# Patient Record
Sex: MALE | Race: WHITE | NOT HISPANIC OR LATINO | Employment: OTHER | URBAN - METROPOLITAN AREA
[De-identification: names, ages, dates, MRNs, and addresses within clinical notes are randomized per-mention and may not be internally consistent; named-entity substitution may affect disease eponyms.]

---

## 2018-02-28 ENCOUNTER — OFFICE VISIT CONVERTED (OUTPATIENT)
Dept: CARDIOLOGY | Facility: CLINIC | Age: 58
End: 2018-02-28
Attending: INTERNAL MEDICINE

## 2018-04-06 ENCOUNTER — OFFICE VISIT CONVERTED (OUTPATIENT)
Dept: OTOLARYNGOLOGY | Facility: CLINIC | Age: 58
End: 2018-04-06
Attending: OTOLARYNGOLOGY

## 2018-04-23 ENCOUNTER — CONVERSION ENCOUNTER (OUTPATIENT)
Dept: UROLOGY | Facility: CLINIC | Age: 58
End: 2018-04-23

## 2018-04-23 ENCOUNTER — OFFICE VISIT CONVERTED (OUTPATIENT)
Dept: UROLOGY | Facility: CLINIC | Age: 58
End: 2018-04-23
Attending: UROLOGY

## 2018-05-04 ENCOUNTER — OFFICE VISIT CONVERTED (OUTPATIENT)
Dept: OTOLARYNGOLOGY | Facility: CLINIC | Age: 58
End: 2018-05-04
Attending: OTOLARYNGOLOGY

## 2018-05-24 ENCOUNTER — OFFICE VISIT CONVERTED (OUTPATIENT)
Dept: UROLOGY | Facility: CLINIC | Age: 58
End: 2018-05-24
Attending: UROLOGY

## 2018-05-24 ENCOUNTER — CONVERSION ENCOUNTER (OUTPATIENT)
Dept: UROLOGY | Facility: CLINIC | Age: 58
End: 2018-05-24

## 2018-06-15 ENCOUNTER — OFFICE VISIT CONVERTED (OUTPATIENT)
Dept: OTOLARYNGOLOGY | Facility: CLINIC | Age: 58
End: 2018-06-15
Attending: OTOLARYNGOLOGY

## 2018-09-17 ENCOUNTER — TELEPHONE (OUTPATIENT)
Dept: ENDOCRINOLOGY | Age: 58
End: 2018-09-17

## 2018-09-17 NOTE — TELEPHONE ENCOUNTER
----- Message from Cristóbal Lee sent at 9/14/2018  3:32 PM EDT -----  Contact: PATIENT  PATIENT HAS AN APPT. OCT. 9TH, HE IS GOING TO RUN OUT OF [facility] AND WOULD LIKE A REFILL BEFORE HE GETS IN HERE IF AT ALL POSSIBLE     [facility] [address] [place] [address] [place] - [address] - [phone]  - [phone] (Phone)  [phone] (Fax)          Patient will need to be seen before refills can be sent.

## 2018-10-09 ENCOUNTER — OFFICE VISIT (OUTPATIENT)
Dept: ENDOCRINOLOGY | Age: 58
End: 2018-10-09

## 2018-10-09 VITALS
BODY MASS INDEX: 27.05 KG/M2 | DIASTOLIC BLOOD PRESSURE: 64 MMHG | SYSTOLIC BLOOD PRESSURE: 104 MMHG | WEIGHT: 210.8 LBS | HEIGHT: 74 IN

## 2018-10-09 DIAGNOSIS — F52.21 ERECTILE DYSFUNCTION OF NONORGANIC ORIGIN: ICD-10-CM

## 2018-10-09 DIAGNOSIS — E11.65 UNCONTROLLED TYPE 2 DIABETES MELLITUS WITH HYPERGLYCEMIA (HCC): Primary | ICD-10-CM

## 2018-10-09 DIAGNOSIS — E03.9 HYPOTHYROIDISM, UNSPECIFIED TYPE: ICD-10-CM

## 2018-10-09 DIAGNOSIS — E55.9 VITAMIN D DEFICIENCY: ICD-10-CM

## 2018-10-09 DIAGNOSIS — E11.42 DIABETIC PERIPHERAL NEUROPATHY (HCC): ICD-10-CM

## 2018-10-09 DIAGNOSIS — E29.1 HYPOGONADISM IN MALE: ICD-10-CM

## 2018-10-09 DIAGNOSIS — I10 BENIGN ESSENTIAL HTN: ICD-10-CM

## 2018-10-09 DIAGNOSIS — E23.0 PANHYPOPITUITARISM (HCC): ICD-10-CM

## 2018-10-09 PROBLEM — R41.3 MEMORY LOSS: Status: ACTIVE | Noted: 2018-10-09

## 2018-10-09 PROBLEM — E11.9 DIABETES MELLITUS (HCC): Status: ACTIVE | Noted: 2018-10-09

## 2018-10-09 PROBLEM — R41.840 ATTENTION DISTURBANCE: Status: ACTIVE | Noted: 2018-10-09

## 2018-10-09 PROBLEM — IMO0002 UNCONTROLLED TYPE 2 DIABETES MELLITUS: Status: ACTIVE | Noted: 2018-10-09

## 2018-10-09 PROCEDURE — 99204 OFFICE O/P NEW MOD 45 MIN: CPT | Performed by: NURSE PRACTITIONER

## 2018-10-09 RX ORDER — ATORVASTATIN CALCIUM 40 MG/1
40 TABLET, FILM COATED ORAL DAILY
COMMUNITY
Start: 2015-06-23 | End: 2019-01-14 | Stop reason: SDUPTHER

## 2018-10-09 RX ORDER — INSULIN DEGLUDEC 200 U/ML
80 INJECTION, SOLUTION SUBCUTANEOUS DAILY
Qty: 3 PEN | Refills: 5 | Status: SHIPPED | OUTPATIENT
Start: 2018-10-09 | End: 2019-01-14 | Stop reason: SDUPTHER

## 2018-10-09 RX ORDER — MELOXICAM 7.5 MG/1
7.5 TABLET ORAL DAILY
COMMUNITY
Start: 2015-06-23 | End: 2022-08-22 | Stop reason: ALTCHOICE

## 2018-10-09 RX ORDER — TAMSULOSIN HYDROCHLORIDE 0.4 MG/1
0.4 CAPSULE ORAL DAILY
COMMUNITY
Start: 2015-06-23

## 2018-10-09 RX ORDER — LEVOTHYROXINE SODIUM 0.12 MG/1
125 TABLET ORAL DAILY
COMMUNITY
End: 2018-10-15

## 2018-10-09 RX ORDER — ANASTROZOLE 1 MG/1
1 TABLET ORAL DAILY
Status: ON HOLD | COMMUNITY
End: 2020-01-14

## 2018-10-09 RX ORDER — DAPAGLIFLOZIN AND METFORMIN HYDROCHLORIDE 5; 1000 MG/1; MG/1
2 TABLET, FILM COATED, EXTENDED RELEASE ORAL DAILY
Qty: 60 TABLET | Refills: 5
Start: 2018-10-09 | End: 2018-10-31 | Stop reason: SDUPTHER

## 2018-10-09 RX ORDER — SPIRONOLACTONE 25 MG/1
1 TABLET ORAL DAILY
COMMUNITY
Start: 2015-06-23 | End: 2019-01-14 | Stop reason: SDUPTHER

## 2018-10-09 RX ORDER — ALLOPURINOL 300 MG/1
300 TABLET ORAL DAILY
COMMUNITY
Start: 2015-06-23 | End: 2019-01-14 | Stop reason: SDUPTHER

## 2018-10-09 RX ORDER — LEVOTHYROXINE SODIUM 0.1 MG/1
100 TABLET ORAL DAILY
COMMUNITY
End: 2018-10-15

## 2018-10-09 RX ORDER — RANITIDINE 150 MG/1
1 CAPSULE ORAL DAILY
COMMUNITY
Start: 2015-06-23

## 2018-10-09 RX ORDER — LISINOPRIL 10 MG/1
10 TABLET ORAL DAILY
COMMUNITY
Start: 2015-06-23 | End: 2019-01-14 | Stop reason: SDUPTHER

## 2018-10-09 RX ORDER — ASPIRIN 81 MG/1
1 TABLET ORAL DAILY
COMMUNITY
Start: 2015-06-23

## 2018-10-09 RX ORDER — CARVEDILOL 12.5 MG/1
1 TABLET ORAL 2 TIMES DAILY
COMMUNITY
Start: 2015-06-23 | End: 2019-01-14 | Stop reason: SDUPTHER

## 2018-10-09 NOTE — PATIENT INSTRUCTIONS
Increase tresiba u200 80 units once daily in morning  Take bs's 4 times daily  Stop sliding scale insulin  Take humalog 15 units prior to each meal  bydureon bcise 2 mg once weekly  Continue xigduo

## 2018-10-09 NOTE — PROGRESS NOTES
"Subjective   Narciso Mejía is a 58 y.o. male is here today for New patient referral.  Chief Complaint   Patient presents with   • Diabetes     New patient, New patient paperwork   • Hypothyroidism     /64   Ht 188 cm (74\")   Wt 95.6 kg (210 lb 12.8 oz)   BMI 27.07 kg/m²   Current Outpatient Prescriptions on File Prior to Visit   Medication Sig   • TANZEUM 50 MG pen-injector INJECT 1 PEN PER WEEK AS INSTRUCTED     No current facility-administered medications on file prior to visit.      No family history on file.  Social History   Substance Use Topics   • Smoking status: Not on file   • Smokeless tobacco: Not on file   • Alcohol use Not on file     Allergies   Allergen Reactions   • Penicillins Swelling         History of Present Illness  Encounter Diagnoses   Name Primary?   • Hypogonadism in male    • Hypothyroidism, unspecified type    • Uncontrolled type 2 diabetes mellitus with hyperglycemia (CMS/[facility]) Yes   • Erectile dysfunction of nonorganic origin    This is a 58-year-old male patient here today as a new patient referral.  He was a previous patient of ours greater than 3 years ago.  He has been managed by his primary care provider as well as another endocrinologist prior to coming back.  He is thinking that he may be needs a insulin pump.  He is currently not count carbohydrates.  He was diagnosed with diabetes 17 years ago.  He has memory issues and is only taking his medications approximately 50% of the time.  He is complaining of increased thirst and increased urination.  He drinks a lot of Gatorade, Powerade and daily to combat thirst.  He has never seen a neurologist regarding his issues with memory loss.  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  He states \"providers refuse to prescribe it for him.  He has regained approximately 25 pounds in the past 4 months according to his home skills.  He states he got down to 185 pounds and " now is up to 210.  He states his blood sugars have been 300+ and sometimes his meter will just read high.  We discussed options for pump therapy however patient is not currently checking his blood sugars and given his history of poor compliance he may not be a good candidate for an insulin pump.  We did discuss continuous glucose monitoring which would benefit him.  Patient was given information at today's visit.  He is a poor historian regarding his current medications and his medication list was revised several times while patient was in the office.  He states he is currently on testosterone therapy however he is not being prescribed as much testosterone as he was prior to when he will follow-up in this practice. He does not eat sweets, bread, carbs, or alcohol. He states he mostly eats meat, cheese and veggies.         The following portions of the patient's history were reviewed and updated as appropriate: allergies, current medications, past family history, past medical history, past social history, past surgical history and problem list.    Review of Systems   Constitutional: Positive for fatigue.   HENT: Negative for trouble swallowing.    Eyes: Negative for visual disturbance.   Respiratory: Negative for shortness of breath.    Cardiovascular: Negative for leg swelling.   Endocrine: Negative for polyuria.   Skin: Negative for wound.   Neurological: Positive for numbness.       Objective   Physical Exam   Constitutional: He is oriented to person, place, and time. He appears well-developed and well-nourished. No distress.   HENT:   Head: Normocephalic and atraumatic.   Right Ear: External ear normal.   Left Ear: External ear normal.   Nose: Nose normal.   Eyes: Pupils are equal, round, and reactive to light. Right eye exhibits no discharge. Left eye exhibits no discharge.   Neck: Normal range of motion. Neck supple. Carotid bruit is not present. No tracheal deviation, no edema and no erythema present. No  thyromegaly present.   Cardiovascular: Normal rate, regular rhythm, normal heart sounds and intact distal pulses.  Exam reveals no gallop and no friction rub.    No murmur heard.  Pulmonary/Chest: Effort normal and breath sounds normal. No respiratory distress. He has no wheezes. He has no rales.   Abdominal: Soft. Bowel sounds are normal. He exhibits no distension. There is no tenderness.   Musculoskeletal: Normal range of motion. He exhibits no edema or deformity.    Diabetic foot exam performed: no lesions skin intact callus on rt ankle.   During the foot exam he had a monofilament test performed (no feeling up to knees).  Vascular Status -  His right foot exhibits normal foot vasculature  and no edema. His left foot exhibits normal foot vasculature  and no edema.  Skin Integrity  -  His right foot skin is intact.His left foot skin is intact..  Lymphadenopathy:     He has no cervical adenopathy.   Neurological: He is alert and oriented to person, place, and time. Coordination normal.   Skin: Skin is warm and dry. No rash noted. He is not diaphoretic. No erythema. No pallor.   Psychiatric: He has a normal mood and affect. His behavior is normal. Judgment and thought content normal.   Nursing note and vitals reviewed.       Results for orders placed or performed in visit on 06/23/15   TestT+TestF+SHBG   Result Value Ref Range    Testosterone, Total 898 348 - 1,197 ng/dL    Comment      Testosterone, Free 15.1 7.2 - 24.0 pg/mL    Sex Hormone Binding Globulin 50.1 19.3 - 76.4 nmol/L   Comprehensive metabolic panel   Result Value Ref Range    Glucose 89 65 - 99 mg/dL    BUN 23 (H) 6 - 20 mg/dL    Creatinine 1.07 0.76 - 1.27 mg/dL    eGFR Non African Am >60 mL/min/1.732    eGFR African Am >60 mL/min/1.732    BUN/Creatinine Ratio 21     Sodium 140 136 - 145 mmol/L    Potassium 4.0 3.5 - 5.2 mmol/L    Chloride 104 98 - 107 mmol/L    Total CO2 23 22 - 29 mmol/L    Calcium 9.9 8.6 - 10.5 mg/dL    Total Protein 7.2 6.0 -  8.5 g/dL    Albumin 4.8 3.5 - 5.2 g/dL    Globulin 2.4 g/dL    A/G Ratio 2.0     Total Bilirubin 0.5 0.1 - 1.2 mg/dL    Alkaline Phosphatase 72 39 - 117 U/L    AST (SGOT) 31 5 - 40 U/L    ALT (SGPT) 32 5 - 41 U/L   Lipid panel   Result Value Ref Range    Total Cholesterol 97 0 - 200 mg/dL    Triglycerides 98 0 - 150 mg/dL    HDL Cholesterol 29 (L) 40 - 60 mg/dL    VLDL Cholesterol 20 mg/dL    LDL Cholesterol  49 0 - 100 mg/dL   Thyroid Panel With TSH   Result Value Ref Range    TSH 0.009 (L) 0.450 - 4.500 uIU/mL    T4, Total 9.9 4.5 - 12.0 ug/dL    T3 Uptake 36 24 - 39 %    Free Thyroxine Index 3.6 1.2 - 4.9   Hemoglobin and hematocrit, blood   Result Value Ref Range    Hemoglobin 17.1 13.7 - 17.6 g/dL    Hematocrit 50.4 40.4 - 52.2 %   Hemoglobin A1c   Result Value Ref Range    Hemoglobin A1C 9.3 (H) 4.8 - 5.6 %   T4, free   Result Value Ref Range    Free T4 2.65 (H) 0.93 - 1.70 ng/dL   C-peptide   Result Value Ref Range    C-Peptide 2.4 1.1 - 4.4 ng/mL   Thyrotropin receptor antibody   Result Value Ref Range    Thyrotropin Receptor Antibody <0.51 0.00 - 1.75 IU/L   PSA   Result Value Ref Range    PSA 0.37 0.00 - 4.00 ng/mL   Insulin-like growth factor   Result Value Ref Range    Insulin-Like Growth Factor-1 100 61 - 200 ng/mL   Thyroglobulin with Anti-TG   Result Value Ref Range    Thyroglobulin Ab <1.0 0.0 - 0.9 IU/mL   Conv Thyroglobulin, Serum   Result Value Ref Range    Thyroglobulin 0.2 (L) 1.4 - 29.2 ng/mL   Vitamin D 25 hydroxy   Result Value Ref Range    25 Hydroxy, Vitamin D 58.5 30.0 - 100.0 ng/mL   MicroAlbumin, urine, random   Result Value Ref Range    Microalbumin, Urine 5.0 0.0 - 17.0 ug/mL   Growth hormone   Result Value Ref Range    Growth Hormone 1.7 0.0 - 10.0 ng/mL   Thyroid peroxidase antibody   Result Value Ref Range    Thyroid Peroxidase Antibody 7 0 - 34 IU/mL   T3, free   Result Value Ref Range    T3, Free 3.6 2.0 - 4.4 pg/mL     Lab Results   Component Value Date    HGBA1C 9.3 (H)  06/23/2015       Assessment/Plan   Problems Addressed this Visit        Endocrine    Hypogonadism in male    Hypothyroidism    Relevant Medications    carvedilol (COREG) 12.5 MG tablet    levothyroxine (SYNTHROID, LEVOTHROID) 100 MCG tablet    levothyroxine (SYNTHROID, LEVOTHROID) 125 MCG tablet    Uncontrolled type 2 diabetes mellitus (CMS/HCC) - Primary    Relevant Medications    insulin degludec (TRESIBA FLEXTOUCH) 100 UNIT/ML solution pen-injector injection    Insulin Lispro (HUMALOG KWIKPEN) 100 UNIT/ML solution pen-injector       Other    Erectile dysfunction of nonorganic origin        In summary, patient was seen and examined.  His medical records were refilled viewed from his primary care provider.  His last hemoglobin A1c was 9.9.  His TSH was low at 0.01.  He does not have any renal insufficiency according to his last CMP.  He will have extensive labs done at today's visit and will be notified of the results along with any further recommendations.  In the meantime his medications were changed as listed below.  Patient states he is currently on testosterone therapy not prescribed by this office.  A Kulwinedr has been requested.  He has been advised to stop sliding scale insulin based on blood sugars since this is obviously not working for him.  He has been advised to take Humalog 15 units prior to each meal and to check his blood sugars at least 4 times daily.  He has been prescribed Bydureon 2 mg once weekly.  He was given a sample today of tresiba U200 with instructions to increase to 80 units once daily.  Patient was noted to have bilateral peripheral neuropathy at today's visit.  He does have memory issues and we discussed him discussing with his PCP about a potential referral for neurology.  Patient states he is safe to take care of himself and not a harm to himself due to his memory issues.  I've asked that he bring his medications with him to his next visit so that we can verify what he is currently  taking.  Patient's prognosis for improving his diabetes management is limited based on his history of not taking medications as prescribed.  He will follow-up with me in 3 months.  Increase tresiba u200 80 units once daily in morning  Take bs's 4 times daily  Stop sliding scale insulin  Take humalog 15 units prior to each meal  bydureon bcise 2 mg once weekly

## 2018-10-15 DIAGNOSIS — R74.8 ALKALINE PHOSPHATASE ELEVATION: ICD-10-CM

## 2018-10-15 DIAGNOSIS — E23.0 PANHYPOPITUITARISM (HCC): Primary | ICD-10-CM

## 2018-10-15 LAB
25(OH)D3+25(OH)D2 SERPL-MCNC: 95.4 NG/ML (ref 30–100)
ACTH PLAS-MCNC: 41.5 PG/ML (ref 7.2–63.3)
ALBUMIN SERPL-MCNC: 4.8 G/DL (ref 3.5–5.2)
ALBUMIN/GLOB SERPL: 1.8 G/DL
ALP SERPL-CCNC: 152 U/L (ref 39–117)
ALT SERPL-CCNC: 32 U/L (ref 1–41)
AST SERPL-CCNC: 21 U/L (ref 1–40)
BILIRUB SERPL-MCNC: 0.2 MG/DL (ref 0.1–1.2)
BUN SERPL-MCNC: 23 MG/DL (ref 6–20)
BUN/CREAT SERPL: 25.8 (ref 7–25)
C PEPTIDE SERPL-MCNC: 1.2 NG/ML (ref 1.1–4.4)
CALCIUM SERPL-MCNC: 10.2 MG/DL (ref 8.6–10.5)
CHLORIDE SERPL-SCNC: 102 MMOL/L (ref 98–107)
CHOLEST SERPL-MCNC: 167 MG/DL (ref 0–200)
CO2 SERPL-SCNC: 26.1 MMOL/L (ref 22–29)
CORTIS SERPL-MCNC: 12.3 UG/DL
CREAT SERPL-MCNC: 0.89 MG/DL (ref 0.76–1.27)
FT4I SERPL CALC-MCNC: 3.6 (ref 1.2–4.9)
GH SERPL-MCNC: <0.1 NG/ML (ref 0–10)
GLOBULIN SER CALC-MCNC: 2.6 GM/DL
GLUCOSE SERPL-MCNC: 223 MG/DL (ref 65–99)
HBA1C MFR BLD: 10.1 % (ref 4.8–5.6)
HCT VFR BLD AUTO: 46.7 % (ref 40.4–52.2)
HDLC SERPL-MCNC: 39 MG/DL (ref 40–60)
HGB BLD-MCNC: 16.1 G/DL (ref 13.7–17.6)
IGF-I SERPL-MCNC: 70 NG/ML (ref 54–194)
INSULIN AB SER-ACNC: 16 UU/ML
INTERPRETATION: NORMAL
LDLC SERPL CALC-MCNC: 92 MG/DL (ref 0–100)
Lab: NORMAL
POTASSIUM SERPL-SCNC: 4.4 MMOL/L (ref 3.5–5.2)
PROT SERPL-MCNC: 7.4 G/DL (ref 6–8.5)
PSA SERPL-MCNC: 0.1 NG/ML (ref 0–4)
SHBG SERPL-SCNC: 91.5 NMOL/L (ref 19.3–76.4)
SODIUM SERPL-SCNC: 142 MMOL/L (ref 136–145)
T3FREE SERPL-MCNC: 4.7 PG/ML (ref 2–4.4)
T3RU NFR SERPL: 35 % (ref 24–39)
T4 FREE SERPL-MCNC: 2.48 NG/DL (ref 0.93–1.7)
T4 SERPL-MCNC: 10.4 UG/DL (ref 4.5–12)
TESTOST FREE SERPL-MCNC: 1.5 PG/ML (ref 7.2–24)
TESTOST SERPL-MCNC: 123 NG/DL (ref 264–916)
THYROGLOB AB SERPL-ACNC: <1 IU/ML (ref 0–0.9)
THYROPEROXIDASE AB SERPL-ACNC: 16 IU/ML (ref 0–34)
TRIGL SERPL-MCNC: 181 MG/DL (ref 0–150)
TSH SERPL DL<=0.005 MIU/L-ACNC: <0.006 UIU/ML (ref 0.45–4.5)
URATE SERPL-MCNC: 3.8 MG/DL (ref 3.4–7)
VLDLC SERPL CALC-MCNC: 36.2 MG/DL (ref 5–40)

## 2018-10-15 RX ORDER — LEVOTHYROXINE SODIUM 0.2 MG/1
200 TABLET ORAL DAILY
Qty: 30 TABLET | Refills: 5 | Status: SHIPPED | OUTPATIENT
Start: 2018-10-15 | End: 2019-01-14 | Stop reason: SDUPTHER

## 2018-10-15 RX ORDER — SODIUM CHLORIDE 9 MG/ML
250 INJECTION, SOLUTION INTRAVENOUS ONCE
Status: CANCELLED | OUTPATIENT
Start: 2018-10-15

## 2018-10-18 ENCOUNTER — TELEPHONE (OUTPATIENT)
Dept: ENDOCRINOLOGY | Age: 58
End: 2018-10-18

## 2018-10-18 NOTE — TELEPHONE ENCOUNTER
Called patient with lab results, unable to leave message due to mailbox being full.       Patient called back and johnny gave patient his lab results. The patient expressed understanding.

## 2018-10-23 ENCOUNTER — HOSPITAL ENCOUNTER (OUTPATIENT)
Dept: INFUSION THERAPY | Facility: HOSPITAL | Age: 58
Discharge: HOME | End: 2018-10-23

## 2018-10-23 ENCOUNTER — HOSPITAL ENCOUNTER (OUTPATIENT)
Dept: ULTRASOUND IMAGING | Facility: HOSPITAL | Age: 58
Discharge: HOME | End: 2018-10-23
Admitting: NURSE PRACTITIONER

## 2018-10-23 VITALS
HEART RATE: 74 BPM | OXYGEN SATURATION: 98 % | SYSTOLIC BLOOD PRESSURE: 117 MMHG | DIASTOLIC BLOOD PRESSURE: 78 MMHG | RESPIRATION RATE: 20 BRPM | TEMPERATURE: 97.9 F

## 2018-10-23 DIAGNOSIS — R41.3 MEMORY LOSS: ICD-10-CM

## 2018-10-23 DIAGNOSIS — E23.0 PANHYPOPITUITARISM (HCC): ICD-10-CM

## 2018-10-23 DIAGNOSIS — E03.9 HYPOTHYROIDISM, UNSPECIFIED TYPE: ICD-10-CM

## 2018-10-23 DIAGNOSIS — R41.840 ATTENTION DISTURBANCE: ICD-10-CM

## 2018-10-23 DIAGNOSIS — E29.1 HYPOGONADISM IN MALE: ICD-10-CM

## 2018-10-23 DIAGNOSIS — R74.8 ALKALINE PHOSPHATASE ELEVATION: ICD-10-CM

## 2018-10-23 LAB
ALBUMIN SERPL-MCNC: 4.4 G/DL (ref 3.5–5.2)
ALBUMIN/GLOB SERPL: 1.4 G/DL
ALP SERPL-CCNC: 121 U/L (ref 39–117)
ALT SERPL W P-5'-P-CCNC: 33 U/L (ref 1–41)
ANION GAP SERPL CALCULATED.3IONS-SCNC: 6.1 MMOL/L
ANION GAP SERPL CALCULATED.3IONS-SCNC: 6.7 MMOL/L
ANION GAP SERPL CALCULATED.3IONS-SCNC: 8.8 MMOL/L
ANION GAP SERPL CALCULATED.3IONS-SCNC: 9.1 MMOL/L
ANION GAP SERPL CALCULATED.3IONS-SCNC: 9.2 MMOL/L
ANION GAP SERPL CALCULATED.3IONS-SCNC: 9.9 MMOL/L
AST SERPL-CCNC: 22 U/L (ref 1–40)
BILIRUB SERPL-MCNC: 0.3 MG/DL (ref 0.1–1.2)
BUN BLD-MCNC: 22 MG/DL (ref 6–20)
BUN BLD-MCNC: 25 MG/DL (ref 6–20)
BUN BLD-MCNC: 25 MG/DL (ref 6–20)
BUN BLD-MCNC: 26 MG/DL (ref 6–20)
BUN/CREAT SERPL: 25.6 (ref 7–25)
BUN/CREAT SERPL: 30.1 (ref 7–25)
BUN/CREAT SERPL: 31.3 (ref 7–25)
BUN/CREAT SERPL: 32.9 (ref 7–25)
BUN/CREAT SERPL: 36.1 (ref 7–25)
BUN/CREAT SERPL: 37.1 (ref 7–25)
CALCIUM SPEC-SCNC: 10.2 MG/DL (ref 8.6–10.5)
CALCIUM SPEC-SCNC: 9.5 MG/DL (ref 8.6–10.5)
CALCIUM SPEC-SCNC: 9.5 MG/DL (ref 8.6–10.5)
CALCIUM SPEC-SCNC: 9.6 MG/DL (ref 8.6–10.5)
CALCIUM SPEC-SCNC: 9.7 MG/DL (ref 8.6–10.5)
CALCIUM SPEC-SCNC: 9.8 MG/DL (ref 8.6–10.5)
CHLORIDE SERPL-SCNC: 106 MMOL/L (ref 98–107)
CHLORIDE SERPL-SCNC: 109 MMOL/L (ref 98–107)
CHLORIDE SERPL-SCNC: 110 MMOL/L (ref 98–107)
CO2 SERPL-SCNC: 21.9 MMOL/L (ref 22–29)
CO2 SERPL-SCNC: 23.1 MMOL/L (ref 22–29)
CO2 SERPL-SCNC: 23.9 MMOL/L (ref 22–29)
CO2 SERPL-SCNC: 24.2 MMOL/L (ref 22–29)
CO2 SERPL-SCNC: 24.3 MMOL/L (ref 22–29)
CO2 SERPL-SCNC: 28.8 MMOL/L (ref 22–29)
CREAT BLD-MCNC: 0.7 MG/DL (ref 0.76–1.27)
CREAT BLD-MCNC: 0.72 MG/DL (ref 0.76–1.27)
CREAT BLD-MCNC: 0.79 MG/DL (ref 0.76–1.27)
CREAT BLD-MCNC: 0.8 MG/DL (ref 0.76–1.27)
CREAT BLD-MCNC: 0.83 MG/DL (ref 0.76–1.27)
CREAT BLD-MCNC: 0.86 MG/DL (ref 0.76–1.27)
GFR SERPL CREATININE-BSD FRML MDRD: 101 ML/MIN/1.73
GFR SERPL CREATININE-BSD FRML MDRD: 112 ML/MIN/1.73
GFR SERPL CREATININE-BSD FRML MDRD: 116 ML/MIN/1.73
GFR SERPL CREATININE-BSD FRML MDRD: 91 ML/MIN/1.73
GFR SERPL CREATININE-BSD FRML MDRD: 95 ML/MIN/1.73
GFR SERPL CREATININE-BSD FRML MDRD: 99 ML/MIN/1.73
GLOBULIN UR ELPH-MCNC: 3.1 GM/DL
GLUCOSE BLD-MCNC: 149 MG/DL (ref 65–99)
GLUCOSE BLD-MCNC: 157 MG/DL (ref 65–99)
GLUCOSE BLD-MCNC: 169 MG/DL (ref 65–99)
GLUCOSE BLD-MCNC: 171 MG/DL (ref 65–99)
GLUCOSE BLD-MCNC: 181 MG/DL (ref 65–99)
GLUCOSE BLD-MCNC: 197 MG/DL (ref 65–99)
POTASSIUM BLD-SCNC: 4.3 MMOL/L (ref 3.5–5.2)
POTASSIUM BLD-SCNC: 4.6 MMOL/L (ref 3.5–5.2)
POTASSIUM BLD-SCNC: 4.6 MMOL/L (ref 3.5–5.2)
POTASSIUM BLD-SCNC: 5 MMOL/L (ref 3.5–5.2)
POTASSIUM BLD-SCNC: 5.3 MMOL/L (ref 3.5–5.2)
POTASSIUM BLD-SCNC: 5.5 MMOL/L (ref 3.5–5.2)
PROT SERPL-MCNC: 7.5 G/DL (ref 6–8.5)
SODIUM BLD-SCNC: 139 MMOL/L (ref 136–145)
SODIUM BLD-SCNC: 140 MMOL/L (ref 136–145)
SODIUM BLD-SCNC: 141 MMOL/L (ref 136–145)
SODIUM BLD-SCNC: 142 MMOL/L (ref 136–145)
SODIUM BLD-SCNC: 142 MMOL/L (ref 136–145)
SODIUM BLD-SCNC: 144 MMOL/L (ref 136–145)

## 2018-10-23 PROCEDURE — 83003 ASSAY GROWTH HORMONE (HGH): CPT | Performed by: NURSE PRACTITIONER

## 2018-10-23 PROCEDURE — 80053 COMPREHEN METABOLIC PANEL: CPT | Performed by: NURSE PRACTITIONER

## 2018-10-23 PROCEDURE — 76705 ECHO EXAM OF ABDOMEN: CPT

## 2018-10-23 PROCEDURE — 36415 COLL VENOUS BLD VENIPUNCTURE: CPT

## 2018-10-23 PROCEDURE — 96365 THER/PROPH/DIAG IV INF INIT: CPT

## 2018-10-23 RX ORDER — SODIUM CHLORIDE 9 MG/ML
250 INJECTION, SOLUTION INTRAVENOUS ONCE
OUTPATIENT
Start: 2018-10-23

## 2018-10-23 RX ORDER — SODIUM CHLORIDE 9 MG/ML
250 INJECTION, SOLUTION INTRAVENOUS ONCE
Status: DISCONTINUED | OUTPATIENT
Start: 2018-10-23 | End: 2018-10-25 | Stop reason: HOSPADM

## 2018-10-23 RX ADMIN — ARGININE HYDROCHLORIDE 30 G: 10 INJECTION, SOLUTION INTRAVENOUS at 09:45

## 2018-10-23 NOTE — PROGRESS NOTES
Patient was NPO since midnight.  Tolerated procedure well.  Voided before test started and dozed intermittently during test.

## 2018-10-23 NOTE — PATIENT INSTRUCTIONS
Growth Hormone Stimulation Test  Growth hormone is made by the pituitary gland. The pituitary gland is a small organ located in the center of your brain. Growth hormone promotes growth from birth through the end of puberty. This is a blood test that may be done if your health care provider thinks you have growth hormone levels that are too low (deficient).  Growth hormone is released in different amounts during the day, so testing it randomly would not give an accurate measurement. Low blood sugar (glucose) levels normally cause the body to temporarily increase production of growth hormone. By causing you to experience low blood sugar (hypoglycemia), your health care provider can determine if your body has the ability to produce growth hormone as it should. One or more of the stimulants listed below can be used to test the growth hormone response:  · Insulin-induced hypoglycemia. This is when your blood sugar level is lowered to less than 40 mg/dL.  · Heavy exercise.  · Medicines such as:  ? Arginine.  ? Glucagon.  ? Levodopa.  ? Clonidine.    When two of these stimulants are used to perform the growth hormone stimulation test, it is called a double-stimulated test.  It may not be safe for you to have this test if you have any of the following conditions:  · Epilepsy.  · Cerebrovascular disease.  · A history of heart attack (myocardial infarction).  · Low baseline cortisol levels in your blood.    Ask your health care provider if it is safe for you to have this test.  How do I prepare for this test?  Do not eat or drink anything except water after midnight on the night before the test or as directed by your health care provider.  What do the results mean?  It is your responsibility to obtain your test results. Ask the lab or department performing the test when and how you will get your results. Contact your health care provider to discuss any questions you have about your results.  Range of Normal Values  Ranges for  normal values may vary among different labs and hospitals. You should always check with your health care provider after having lab work or other tests done to discuss whether your values are considered within normal limits.  For this test, normal values include growth hormone levels greater than 10 ng/dL or greater than 10 mcg/L (SI units).  Meaning of Results Outside Normal Value Ranges  Abnormally low growth hormone stimulation test results may indicate health problems, such as:  · Pituitary gland malfunction (pituitary deficiency).  · Growth hormone deficiency.    Discuss your test results with your health care provider. He or she will use the results to make a diagnosis and determine a treatment plan that is right for you.  Talk with your health care provider to discuss your results, treatment options, and if necessary, the need for more tests. Talk with your health care provider if you have any questions about your results.  This information is not intended to replace advice given to you by your health care provider. Make sure you discuss any questions you have with your health care provider.  Document Released: 01/11/2006 Document Revised: 08/23/2017 Document Reviewed: 04/28/2015  [employer] Interactive Patient Education © 2018 [employer]

## 2018-10-24 LAB
GH SERPL-MCNC: 0.2 NG/ML (ref 0–10)
GH SERPL-MCNC: 0.3 NG/ML (ref 0–10)
GH SERPL-MCNC: 0.4 NG/ML (ref 0–10)
GH SERPL-MCNC: 0.7 NG/ML (ref 0–10)
GH SERPL-MCNC: 1.4 NG/ML (ref 0–10)
GH SERPL-MCNC: 1.7 NG/ML (ref 0–10)
GH SERPL-MCNC: 1.9 NG/ML (ref 0–10)

## 2018-10-31 ENCOUNTER — TELEPHONE (OUTPATIENT)
Dept: ENDOCRINOLOGY | Age: 58
End: 2018-10-31

## 2018-10-31 RX ORDER — DAPAGLIFLOZIN AND METFORMIN HYDROCHLORIDE 5; 1000 MG/1; MG/1
2 TABLET, FILM COATED, EXTENDED RELEASE ORAL DAILY
Qty: 60 TABLET | Refills: 5 | Status: SHIPPED | OUTPATIENT
Start: 2018-10-31 | End: 2018-11-01 | Stop reason: SDUPTHER

## 2018-10-31 NOTE — TELEPHONE ENCOUNTER
----- Message from Cristóbal Lee sent at 10/31/2018  1:59 PM EDT -----  Contact: wife grayson  Patient needs script of XIGDUO XR 5-1000 MG tablet to be sent to     [facility] [address] [place] [address] - [address] [place] [address] - [phone]  - [phone] (Phone)  [phone] (Fax)    Patient is completely out, wife stated that she left a message last week and no one answered.         Refill has been sent into the pharmacy.

## 2018-11-01 RX ORDER — DAPAGLIFLOZIN AND METFORMIN HYDROCHLORIDE 5; 1000 MG/1; MG/1
2 TABLET, FILM COATED, EXTENDED RELEASE ORAL DAILY
Qty: 60 TABLET | Refills: 4 | Status: SHIPPED | OUTPATIENT
Start: 2018-11-01 | End: 2019-01-14 | Stop reason: SDUPTHER

## 2018-11-21 ENCOUNTER — TELEPHONE (OUTPATIENT)
Dept: ENDOCRINOLOGY | Age: 58
End: 2018-11-21

## 2018-11-21 NOTE — TELEPHONE ENCOUNTER
Spoke with pt and let him know that stim test was normal and that testosterone should be addressed with other provider. He asked to be put on the waitlist.    ----- Message from LOIS Vidal sent at 11/20/2018  3:46 PM EST -----  Contact: patient   His growth hormone was normal according to stim test that was done. His testosterone is managed by another practice according to my note. He can be put on wait list with dr johnson if he has concerns.   ----- Message -----  From: Monica Sy MA  Sent: 11/20/2018   2:57 PM  To: LOIS Vidal        ----- Message -----  From: Sola Estrada  Sent: 11/20/2018   1:00 PM  To: Monica Sy MA    Patient has called in regards to receiving  a letter about his stimulation testing results. Patient is very concerned about his HGH and Testerone levels.  Patient did not know, if he needed to be seen sooner appointment  to go over these results. Patient was also calling in regards to having a significant weight gain. Patient is stating, he has gained 30 pounds with in the last six months.  Patient is very concerned because he has not this significant amount of weight gain before. Patient is requesting a call back at [phone]

## 2019-01-14 ENCOUNTER — OFFICE VISIT (OUTPATIENT)
Dept: ENDOCRINOLOGY | Age: 59
End: 2019-01-14

## 2019-01-14 ENCOUNTER — TELEPHONE (OUTPATIENT)
Dept: ENDOCRINOLOGY | Age: 59
End: 2019-01-14

## 2019-01-14 VITALS
WEIGHT: 216 LBS | BODY MASS INDEX: 29.26 KG/M2 | HEIGHT: 72 IN | DIASTOLIC BLOOD PRESSURE: 72 MMHG | SYSTOLIC BLOOD PRESSURE: 128 MMHG

## 2019-01-14 DIAGNOSIS — Z91.14 NONADHERENCE TO MEDICATION: ICD-10-CM

## 2019-01-14 DIAGNOSIS — E23.0 PANHYPOPITUITARISM (HCC): ICD-10-CM

## 2019-01-14 DIAGNOSIS — I10 BENIGN ESSENTIAL HTN: ICD-10-CM

## 2019-01-14 DIAGNOSIS — E11.65 UNCONTROLLED TYPE 2 DIABETES MELLITUS WITH HYPERGLYCEMIA (HCC): Primary | ICD-10-CM

## 2019-01-14 DIAGNOSIS — E29.1 HYPOGONADISM IN MALE: ICD-10-CM

## 2019-01-14 DIAGNOSIS — E55.9 VITAMIN D DEFICIENCY: ICD-10-CM

## 2019-01-14 DIAGNOSIS — E11.42 DIABETIC PERIPHERAL NEUROPATHY (HCC): ICD-10-CM

## 2019-01-14 DIAGNOSIS — F52.21 ERECTILE DYSFUNCTION OF NONORGANIC ORIGIN: ICD-10-CM

## 2019-01-14 PROBLEM — Z91.148 NONADHERENCE TO MEDICATION: Status: ACTIVE | Noted: 2019-01-14

## 2019-01-14 PROCEDURE — 99214 OFFICE O/P EST MOD 30 MIN: CPT | Performed by: NURSE PRACTITIONER

## 2019-01-14 RX ORDER — SPIRONOLACTONE 25 MG/1
25 TABLET ORAL DAILY
Qty: 90 TABLET | Refills: 1 | Status: SHIPPED | OUTPATIENT
Start: 2019-01-14

## 2019-01-14 RX ORDER — CARVEDILOL 12.5 MG/1
12.5 TABLET ORAL 2 TIMES DAILY
Qty: 180 TABLET | Refills: 1 | Status: SHIPPED | OUTPATIENT
Start: 2019-01-14 | End: 2022-08-22 | Stop reason: ALTCHOICE

## 2019-01-14 RX ORDER — DAPAGLIFLOZIN AND METFORMIN HYDROCHLORIDE 5; 1000 MG/1; MG/1
2 TABLET, FILM COATED, EXTENDED RELEASE ORAL DAILY
Qty: 180 TABLET | Refills: 1 | Status: SHIPPED | OUTPATIENT
Start: 2019-01-14 | End: 2022-08-22 | Stop reason: ALTCHOICE

## 2019-01-14 RX ORDER — ATORVASTATIN CALCIUM 40 MG/1
40 TABLET, FILM COATED ORAL DAILY
Qty: 90 TABLET | Refills: 1 | Status: SHIPPED | OUTPATIENT
Start: 2019-01-14 | End: 2023-02-10

## 2019-01-14 RX ORDER — LISINOPRIL 10 MG/1
10 TABLET ORAL DAILY
Qty: 90 TABLET | Refills: 1 | Status: SHIPPED | OUTPATIENT
Start: 2019-01-14 | End: 2022-08-22 | Stop reason: ALTCHOICE

## 2019-01-14 RX ORDER — ALLOPURINOL 300 MG/1
300 TABLET ORAL DAILY
Qty: 90 TABLET | Refills: 1 | Status: SHIPPED | OUTPATIENT
Start: 2019-01-14

## 2019-01-14 RX ORDER — TESTOSTERONE CYPIONATE 200 MG/ML
INJECTION, SOLUTION INTRAMUSCULAR
Qty: 4 ML | Refills: 0 | Status: SHIPPED | OUTPATIENT
Start: 2019-01-14 | End: 2022-08-22 | Stop reason: ALTCHOICE

## 2019-01-14 RX ORDER — INSULIN DEGLUDEC 200 U/ML
80 INJECTION, SOLUTION SUBCUTANEOUS DAILY
Qty: 9 PEN | Refills: 1 | Status: SHIPPED | OUTPATIENT
Start: 2019-01-14

## 2019-01-14 RX ORDER — LEVOTHYROXINE SODIUM 0.2 MG/1
200 TABLET ORAL DAILY
Qty: 90 TABLET | Refills: 1 | Status: ON HOLD | OUTPATIENT
Start: 2019-01-14 | End: 2020-01-15

## 2019-01-14 NOTE — TELEPHONE ENCOUNTER
----- Message from LOIS Vidal sent at 1/14/2019  3:58 PM EST -----  Contact: BABAR-PHARMACIST  We monitor potassium and he is on low dose of spironolactone.   ----- Message -----  From: Mabel Pratt MA  Sent: 1/14/2019   3:44 PM  To: LOIS Vidal    No they just wanted us to be aware.   ----- Message -----  From: Susu Heck APRN  Sent: 1/14/2019   2:52 PM  To: Mabel Pratt MA    This is not a new rx this is a continuation of therapy according to med list. Are they stating this is a new rx?  ----- Message -----  From: Mabel Pratt MA  Sent: 1/14/2019   1:42 PM  To: LOIS Vidal    Please advise  ----- Message -----  From: Juanis Herron  Sent: 1/14/2019   1:32 PM  To: Mabel Pratt MA    [facility]: [phone]    POSSIBLE INCREASE POTASSIUM LVL W/ PT BEING ON SPIRONOLACTONE AND LISINOPRIL. HE JUST WANTED TO MAKE SURE THE PROVIDER IS AWARE OF THIS. HE SAID ANYTIME THERE IS A FLAG ON SCRIPTS HE WILL CALL OFFICE TO MAKE SURE IT IS CORRECT.

## 2019-01-14 NOTE — PROGRESS NOTES
"Subjective   Narciso Mejía is a 58 y.o. male is here today for follow-up.  Chief Complaint   Patient presents with   • Diabetes     no recent labs, testing BG 1 to 2 times daily, pt did not bring meter, pt has been experiencing high blood glucose   • Hypertension     pt had been taking 400mcg of levothyroxine until last week   • Hypothyroidism     pt states that he might be having a reaction to bydureon ( welts)   • Hypogonadism   • panhypopituatarism   • Vitamin D Deficiency     /72   Ht 182.9 cm (72\")   Wt 98 kg (216 lb)   BMI 29.29 kg/m²   Current Outpatient Medications on File Prior to Visit   Medication Sig   • allopurinol (ZYLOPRIM) 300 MG tablet Take 300 mg by mouth Daily.   • anastrozole (ARIMIDEX) 1 MG tablet Take 1 mg by mouth Daily.   • aspirin (ECOTRIN LOW STRENGTH) 81 MG EC tablet Take 1 tablet by mouth Daily.   • atorvastatin (LIPITOR) 40 MG tablet Take 40 mg by mouth Daily.   • carvedilol (COREG) 12.5 MG tablet Take 1 tablet by mouth 2 (Two) Times a Day.   • Insulin Lispro (HUMALOG KWIKPEN) 100 UNIT/ML solution pen-injector 15 units prior to each meal   • levothyroxine (SYNTHROID) 200 MCG tablet Take 1 tablet by mouth Daily.   • linaclotide (LINZESS) 145 MCG capsule capsule Take 145 mcg by mouth Every Morning Before Breakfast.   • lisinopril (PRINIVIL,ZESTRIL) 10 MG tablet Take 10 mg by mouth Daily.   • meloxicam (MOBIC) 7.5 MG tablet Take 7.5 mg by mouth Daily.   • spironolactone (ALDACTONE) 25 MG tablet Take 1 tablet by mouth Daily.   • tamsulosin (FLOMAX) 0.4 MG capsule 24 hr capsule Take 0.4 mg by mouth Daily.   • TRESIBA FLEXTOUCH 200 UNIT/ML solution pen-injector Inject 80 Units under the skin into the appropriate area as directed Daily.   • XIGDUO XR 5-1000 MG tablet Take 2 tablets by mouth Daily.   • [DISCONTINUED] exenatide er (BYDUREON BCISE) 2 MG/0.85ML auto-injector injection Inject 0.85 mL under the skin into the appropriate area as directed 1 (One) Time Per Week.   • ranitidine " (ZANTAC) 150 MG capsule Take 1 capsule by mouth Daily.     No current facility-administered medications on file prior to visit.      History reviewed. No pertinent family history.  Social History     Tobacco Use   • Smoking status: Former Smoker     Packs/day: 2.00     Years: 24.00     Pack years: 48.00     Types: Cigarettes   • Smokeless tobacco: Never Used   • Tobacco comment: 19 yeats ago   Substance Use Topics   • Alcohol use: No   • Drug use: No     Allergies   Allergen Reactions   • Bacitracin-Neomycin-Polymyxin Hives     Hives and blisters   • Bydureon [Exenatide] Hives   • Penicillins Swelling         History of Present Illness   Encounter Diagnoses   Name Primary?   • Benign essential HTN    • Vitamin D deficiency     • Diabetic peripheral neuropathy (CMS/HCC)    • Hypogonadism in male    • Panhypopituitarism (CMS/HCC)    • Uncontrolled type 2 diabetes mellitus with hyperglycemia (CMS/HCC) Yes   • Erectile dysfunction of nonorganic origin    • Nonadherence to medication      50-year-old male patient here today for a follow-up visit.  He is being seen for the above-mentioned problems.  He is requesting a prescription for his testosterone therapy.  We did not have testosterone on his medicine list.  He has been getting testosterone from  in the past and has since switched back to this practice.  He used to see Dr. Gaffney when he was in his other office.  He is a poor historian regarding his medical history.  He has been taking double his dose of levothyroxine 400 µg instead of the prescribed 200 µg daily.  His medical history was reviewed and according to his last labs his dose was decreased to 200.  He states he has had about a 7 pound weight loss with taking too much of levothyroxine.  He has been out of insulin for days and states his blood sugar got up to 582.  He was given a new blood glucose meter today's visit.  He is complaining of hives with Bydureon so this medication has been added to his  allergy list.  He has a history of panhypopituitarism and used to be on growth hormone however his last stimulation test was normal.  He's had elevated alkaline phosphatase and had a liver ultrasound done which was reviewed.  His previous labs were reviewed at today's visit.  His last hemoglobin A1c was 10.1.  He was hyperthyroid and his levothyroxine was decreased to 200 however he has been taking double the dose which caused him to run out of his prescription sooner      The following portions of the patient's history were reviewed and updated as appropriate: allergies, current medications, past family history, past medical history, past social history, past surgical history and problem list.    Review of Systems   Constitutional: Negative for fatigue.   HENT: Negative for trouble swallowing.    Eyes: Negative for visual disturbance.   Cardiovascular: Negative for leg swelling.   Endocrine: Negative for polyuria.   Skin: Negative for wound.   Neurological: Negative for numbness.       Objective   Physical Exam   Constitutional: He is oriented to person, place, and time. He appears well-developed and well-nourished. No distress.   HENT:   Head: Normocephalic and atraumatic.   Right Ear: External ear normal.   Left Ear: External ear normal.   Nose: Nose normal.   Eyes: Pupils are equal, round, and reactive to light. Right eye exhibits no discharge. Left eye exhibits no discharge.   Neck: Normal range of motion. Neck supple. Carotid bruit is not present. No tracheal deviation, no edema and no erythema present. No thyromegaly present.   Cardiovascular: Normal rate, regular rhythm, normal heart sounds and intact distal pulses. Exam reveals no gallop and no friction rub.   No murmur heard.  Pulmonary/Chest: Effort normal and breath sounds normal. No respiratory distress. He has no wheezes. He has no rales.   Abdominal: Soft. Bowel sounds are normal. He exhibits no distension. There is no tenderness.   Musculoskeletal:  Normal range of motion. He exhibits no edema or deformity.    Diabetic foot exam performed: no lesions skin intact callus on rt ankle.   During the foot exam he had a monofilament test not performed (History of retinopathy).  Vascular Status -  His right foot exhibits normal foot vasculature  and no edema. His left foot exhibits normal foot vasculature  and no edema.  Skin Integrity  -  His right foot skin is intact.His left foot skin is intact..  Lymphadenopathy:     He has no cervical adenopathy.   Neurological: He is alert and oriented to person, place, and time. Coordination normal.   Skin: Skin is warm and dry. No rash noted. He is not diaphoretic. No erythema. No pallor.   Psychiatric: He has a normal mood and affect. His behavior is normal. Judgment and thought content normal.   Nursing note and vitals reviewed.    Hospital Outpatient Visit on 10/23/2018   Component Date Value Ref Range Status   • Glucose 10/23/2018 171* 65 - 99 mg/dL Final   • BUN 10/23/2018 22* 6 - 20 mg/dL Final   • Creatinine 10/23/2018 0.86  0.76 - 1.27 mg/dL Final   • Sodium 10/23/2018 144  136 - 145 mmol/L Final   • Potassium 10/23/2018 4.6  3.5 - 5.2 mmol/L Final   • Chloride 10/23/2018 106  98 - 107 mmol/L Final   • CO2 10/23/2018 28.8  22.0 - 29.0 mmol/L Final   • Calcium 10/23/2018 10.2  8.6 - 10.5 mg/dL Final   • Total Protein 10/23/2018 7.5  6.0 - 8.5 g/dL Final   • Albumin 10/23/2018 4.40  3.50 - 5.20 g/dL Final   • ALT (SGPT) 10/23/2018 33  1 - 41 U/L Final   • AST (SGOT) 10/23/2018 22  1 - 40 U/L Final   • Alkaline Phosphatase 10/23/2018 121* 39 - 117 U/L Final   • Total Bilirubin 10/23/2018 0.3  0.1 - 1.2 mg/dL Final   • eGFR Non  Amer 10/23/2018 91  >60 mL/min/1.73 Final   • Globulin 10/23/2018 3.1  gm/dL Final   • A/G Ratio 10/23/2018 1.4  g/dL Final   • BUN/Creatinine Ratio 10/23/2018 25.6* 7.0 - 25.0 Final   • Anion Gap 10/23/2018 9.2  mmol/L Final   • Growth Hormone 10/23/2018 0.3  0.0 - 10.0 ng/mL Final   •  Growth Hormone 10/23/2018 0.2  0.0 - 10.0 ng/mL Final   • Glucose 10/23/2018 197* 65 - 99 mg/dL Final   • BUN 10/23/2018 25* 6 - 20 mg/dL Final   • Creatinine 10/23/2018 0.83  0.76 - 1.27 mg/dL Final   • Sodium 10/23/2018 139  136 - 145 mmol/L Final   • Potassium 10/23/2018 5.5* 3.5 - 5.2 mmol/L Final   • Chloride 10/23/2018 109* 98 - 107 mmol/L Final   • CO2 10/23/2018 23.9  22.0 - 29.0 mmol/L Final   • Calcium 10/23/2018 9.6  8.6 - 10.5 mg/dL Final   • eGFR Non African Amer 10/23/2018 95  >60 mL/min/1.73 Final   • BUN/Creatinine Ratio 10/23/2018 30.1* 7.0 - 25.0 Final   • Anion Gap 10/23/2018 6.1  mmol/L Final   • Growth Hormone 10/23/2018 1.9  0.0 - 10.0 ng/mL Final   • Glucose 10/23/2018 181* 65 - 99 mg/dL Final   • BUN 10/23/2018 25* 6 - 20 mg/dL Final   • Creatinine 10/23/2018 0.80  0.76 - 1.27 mg/dL Final   • Sodium 10/23/2018 140  136 - 145 mmol/L Final   • Potassium 10/23/2018 5.3* 3.5 - 5.2 mmol/L Final   • Chloride 10/23/2018 109* 98 - 107 mmol/L Final   • CO2 10/23/2018 24.3  22.0 - 29.0 mmol/L Final   • Calcium 10/23/2018 9.5  8.6 - 10.5 mg/dL Final   • eGFR Non African Amer 10/23/2018 99  >60 mL/min/1.73 Final   • BUN/Creatinine Ratio 10/23/2018 31.3* 7.0 - 25.0 Final   • Anion Gap 10/23/2018 6.7  mmol/L Final   • Growth Hormone 10/23/2018 0.7  0.0 - 10.0 ng/mL Final   • Glucose 10/23/2018 169* 65 - 99 mg/dL Final   • BUN 10/23/2018 26* 6 - 20 mg/dL Final   • Creatinine 10/23/2018 0.79  0.76 - 1.27 mg/dL Final   • Sodium 10/23/2018 142  136 - 145 mmol/L Final   • Potassium 10/23/2018 5.0  3.5 - 5.2 mmol/L Final   • Chloride 10/23/2018 109* 98 - 107 mmol/L Final   • CO2 10/23/2018 24.2  22.0 - 29.0 mmol/L Final   • Calcium 10/23/2018 9.5  8.6 - 10.5 mg/dL Final   • eGFR Non African Amer 10/23/2018 101  >60 mL/min/1.73 Final   • BUN/Creatinine Ratio 10/23/2018 32.9* 7.0 - 25.0 Final   • Anion Gap 10/23/2018 8.8  mmol/L Final   • Growth Hormone 10/23/2018 1.7  0.0 - 10.0 ng/mL Final   • Glucose 10/23/2018  157* 65 - 99 mg/dL Final   • BUN 10/23/2018 26* 6 - 20 mg/dL Final   • Creatinine 10/23/2018 0.72* 0.76 - 1.27 mg/dL Final   • Sodium 10/23/2018 142  136 - 145 mmol/L Final   • Potassium 10/23/2018 4.6  3.5 - 5.2 mmol/L Final   • Chloride 10/23/2018 109* 98 - 107 mmol/L Final   • CO2 10/23/2018 23.1  22.0 - 29.0 mmol/L Final   • Calcium 10/23/2018 9.7  8.6 - 10.5 mg/dL Final   • eGFR Non  Amer 10/23/2018 112  >60 mL/min/1.73 Final   • BUN/Creatinine Ratio 10/23/2018 36.1* 7.0 - 25.0 Final   • Anion Gap 10/23/2018 9.9  mmol/L Final   • Growth Hormone 10/23/2018 1.4  0.0 - 10.0 ng/mL Final   • Glucose 10/23/2018 149* 65 - 99 mg/dL Final   • BUN 10/23/2018 26* 6 - 20 mg/dL Final   • Creatinine 10/23/2018 0.70* 0.76 - 1.27 mg/dL Final   • Sodium 10/23/2018 141  136 - 145 mmol/L Final   • Potassium 10/23/2018 4.3  3.5 - 5.2 mmol/L Final   • Chloride 10/23/2018 110* 98 - 107 mmol/L Final   • CO2 10/23/2018 21.9* 22.0 - 29.0 mmol/L Final   • Calcium 10/23/2018 9.8  8.6 - 10.5 mg/dL Final   • eGFR Non  Amer 10/23/2018 116  >60 mL/min/1.73 Final   • BUN/Creatinine Ratio 10/23/2018 37.1* 7.0 - 25.0 Final   • Anion Gap 10/23/2018 9.1  mmol/L Final   • Growth Hormone 10/23/2018 0.4  0.0 - 10.0 ng/mL Final         Assessment/Plan   Problems Addressed this Visit        Cardiovascular and Mediastinum    Benign essential HTN    Relevant Orders    CBC & Differential    Comprehensive Metabolic Panel    C-Peptide    Hemoglobin A1c    Lipid Panel    Hemoglobin & Hematocrit, Blood    MicroAlbumin, Urine, Random - Urine, Clean Catch    T3, Free    T4, Free    TestT+TestF+SHBG    Thyroid Panel With TSH    Comprehensive Thyroglobulin    Vitamin D 25 Hydroxy    Thyroid Antibodies    Thyroid Peroxidase Antibody    PSA DIAGNOSTIC    FSH & LH    Growth Hormone    Prolactin    Insulin-like Growth Factor    Comprehensive Metabolic Panel    Comprehensive Thyroglobulin    C-Peptide    Hemoglobin A1c    Insulin Antibody    Lipid Panel     MicroAlbumin, Urine, Random - Urine, Clean Catch    T3, Free    TestT+TestF+SHBG    Thyroid Panel With TSH    Thyroid Peroxidase Antibody    Vitamin D 25 Hydroxy    Thyroid Antibodies    TSH    Prolactin    FSH & LH    Insulin-like Growth Factor    Growth Hormone    Hemoglobin & Hematocrit, Blood       Digestive    Vitamin D deficiency     Relevant Orders    CBC & Differential    Comprehensive Metabolic Panel    C-Peptide    Hemoglobin A1c    Lipid Panel    Hemoglobin & Hematocrit, Blood    MicroAlbumin, Urine, Random - Urine, Clean Catch    T3, Free    T4, Free    TestT+TestF+SHBG    Thyroid Panel With TSH    Comprehensive Thyroglobulin    Vitamin D 25 Hydroxy    Thyroid Antibodies    Thyroid Peroxidase Antibody    PSA DIAGNOSTIC    FSH & LH    Growth Hormone    Prolactin    Insulin-like Growth Factor    Comprehensive Metabolic Panel    Comprehensive Thyroglobulin    C-Peptide    Hemoglobin A1c    Insulin Antibody    Lipid Panel    MicroAlbumin, Urine, Random - Urine, Clean Catch    T3, Free    TestT+TestF+SHBG    Thyroid Panel With TSH    Thyroid Peroxidase Antibody    Vitamin D 25 Hydroxy    Thyroid Antibodies    TSH    Prolactin    FSH & LH    Insulin-like Growth Factor    Growth Hormone    Hemoglobin & Hematocrit, Blood       Endocrine    Hypogonadism in male    Relevant Orders    CBC & Differential    Comprehensive Metabolic Panel    C-Peptide    Hemoglobin A1c    Lipid Panel    Hemoglobin & Hematocrit, Blood    MicroAlbumin, Urine, Random - Urine, Clean Catch    T3, Free    T4, Free    TestT+TestF+SHBG    Thyroid Panel With TSH    Comprehensive Thyroglobulin    Vitamin D 25 Hydroxy    Thyroid Antibodies    Thyroid Peroxidase Antibody    PSA DIAGNOSTIC    FSH & LH    Growth Hormone    Prolactin    Insulin-like Growth Factor    Comprehensive Metabolic Panel    Comprehensive Thyroglobulin    C-Peptide    Hemoglobin A1c    Insulin Antibody    Lipid Panel    MicroAlbumin, Urine, Random - Urine, Clean Catch     T3, Free    TestT+TestF+SHBG    Thyroid Panel With TSH    Thyroid Peroxidase Antibody    Vitamin D 25 Hydroxy    Thyroid Antibodies    TSH    Prolactin    FSH & LH    Insulin-like Growth Factor    Growth Hormone    Hemoglobin & Hematocrit, Blood    Panhypopituitarism (CMS/HCC)    Relevant Orders    CBC & Differential    Comprehensive Metabolic Panel    C-Peptide    Hemoglobin A1c    Lipid Panel    Hemoglobin & Hematocrit, Blood    MicroAlbumin, Urine, Random - Urine, Clean Catch    T3, Free    T4, Free    TestT+TestF+SHBG    Thyroid Panel With TSH    Comprehensive Thyroglobulin    Vitamin D 25 Hydroxy    Thyroid Antibodies    Thyroid Peroxidase Antibody    PSA DIAGNOSTIC    FSH & LH    Growth Hormone    Prolactin    Insulin-like Growth Factor    Comprehensive Metabolic Panel    Comprehensive Thyroglobulin    C-Peptide    Hemoglobin A1c    Insulin Antibody    Lipid Panel    MicroAlbumin, Urine, Random - Urine, Clean Catch    T3, Free    TestT+TestF+SHBG    Thyroid Panel With TSH    Thyroid Peroxidase Antibody    Vitamin D 25 Hydroxy    Thyroid Antibodies    TSH    Prolactin    FSH & LH    Insulin-like Growth Factor    Growth Hormone    Hemoglobin & Hematocrit, Blood    Uncontrolled type 2 diabetes mellitus (CMS/[facility]) - Primary    Relevant Orders    CBC & Differential    Comprehensive Metabolic Panel    C-Peptide    Hemoglobin A1c    Lipid Panel    Hemoglobin & Hematocrit, Blood    MicroAlbumin, Urine, Random - Urine, Clean Catch    T3, Free    T4, Free    TestT+TestF+SHBG    Thyroid Panel With TSH    Comprehensive Thyroglobulin    Vitamin D 25 Hydroxy    Thyroid Antibodies    Thyroid Peroxidase Antibody    PSA DIAGNOSTIC    FSH & LH    Growth Hormone    Prolactin    Insulin-like Growth Factor    Comprehensive Metabolic Panel    Comprehensive Thyroglobulin    C-Peptide    Hemoglobin A1c    Insulin Antibody    Lipid Panel    MicroAlbumin, Urine, Random - Urine, Clean Catch    T3, Free    TestT+TestF+SHBG    Thyroid  Panel With TSH    Thyroid Peroxidase Antibody    Vitamin D 25 Hydroxy    Thyroid Antibodies    TSH    Prolactin    FSH & LH    Insulin-like Growth Factor    Growth Hormone    Hemoglobin & Hematocrit, Blood    Diabetic peripheral neuropathy (CMS/HCC)    Relevant Orders    CBC & Differential    Comprehensive Metabolic Panel    C-Peptide    Hemoglobin A1c    Lipid Panel    Hemoglobin & Hematocrit, Blood    MicroAlbumin, Urine, Random - Urine, Clean Catch    T3, Free    T4, Free    TestT+TestF+SHBG    Thyroid Panel With TSH    Comprehensive Thyroglobulin    Vitamin D 25 Hydroxy    Thyroid Antibodies    Thyroid Peroxidase Antibody    PSA DIAGNOSTIC    FSH & LH    Growth Hormone    Prolactin    Insulin-like Growth Factor    Comprehensive Metabolic Panel    Comprehensive Thyroglobulin    C-Peptide    Hemoglobin A1c    Insulin Antibody    Lipid Panel    MicroAlbumin, Urine, Random - Urine, Clean Catch    T3, Free    TestT+TestF+SHBG    Thyroid Panel With TSH    Thyroid Peroxidase Antibody    Vitamin D 25 Hydroxy    Thyroid Antibodies    TSH    Prolactin    FSH & LH    Insulin-like Growth Factor    Growth Hormone    Hemoglobin & Hematocrit, Blood       Other    Erectile dysfunction of nonorganic origin    Relevant Orders    CBC & Differential    Comprehensive Metabolic Panel    C-Peptide    Hemoglobin A1c    Lipid Panel    Hemoglobin & Hematocrit, Blood    MicroAlbumin, Urine, Random - Urine, Clean Catch    T3, Free    T4, Free    TestT+TestF+SHBG    Thyroid Panel With TSH    Comprehensive Thyroglobulin    Vitamin D 25 Hydroxy    Thyroid Antibodies    Thyroid Peroxidase Antibody    PSA DIAGNOSTIC    FSH & LH    Growth Hormone    Prolactin    Insulin-like Growth Factor    Comprehensive Metabolic Panel    Comprehensive Thyroglobulin    C-Peptide    Hemoglobin A1c    Insulin Antibody    Lipid Panel    MicroAlbumin, Urine, Random - Urine, Clean Catch    T3, Free    TestT+TestF+SHBG    Thyroid Panel With TSH    Thyroid Peroxidase  Antibody    Vitamin D 25 Hydroxy    Thyroid Antibodies    TSH    Prolactin    FSH & LH    Insulin-like Growth Factor    Growth Hormone    Hemoglobin & Hematocrit, Blood    Nonadherence to medication    Relevant Orders    Comprehensive Metabolic Panel    Comprehensive Thyroglobulin    C-Peptide    Hemoglobin A1c    Insulin Antibody    Lipid Panel    MicroAlbumin, Urine, Random - Urine, Clean Catch    T3, Free    TestT+TestF+SHBG    Thyroid Panel With TSH    Thyroid Peroxidase Antibody    Vitamin D 25 Hydroxy    Thyroid Antibodies    TSH    Prolactin    FSH & LH    Insulin-like Growth Factor    Growth Hormone    Hemoglobin & Hematocrit, Blood          Summary, patient was seen and examined.  His medical hx  is complicated and he is also a poor historian so it is difficult getting accurate information from patient.  His medication list was reviewed and updated.  MELITA Miramontes was reviewed at today's visit.  A prescription for testosterone was sent electronically to a new [facility] pharmacy per his request.  He is requesting prescription refills to his new pharmacy.  He has been taking too much of his thyroid medication and not enough insulin due to running out.  He did not bring his blood glucose meter at today's visit.  He was given a new meter today.  He has not been taking Arimidex consistently.  His labs were reviewed at today's visit along with Dr. Gaffney.  A Kulwinder was reviewed.  We will change his testosterone to this office.  I have requested that he follow with Dr. Gaffney next due to his complexity of endocrine problems.  His last growth hormone stimulation test was in normal range.  He has been advised to go back to 200 µg of levothyroxine.  He was given a new blood glucose meter at today's visit.  He's been advised to bring his meter each visit.  He will have labs done externally prior to his next visit as well as labs today

## 2019-01-17 LAB
25(OH)D3+25(OH)D2 SERPL-MCNC: 67.4 NG/ML (ref 30–100)
ALBUMIN SERPL-MCNC: 4.2 G/DL (ref 3.5–5.2)
ALBUMIN/GLOB SERPL: 1.4 G/DL
ALP SERPL-CCNC: 141 U/L (ref 39–117)
ALT SERPL-CCNC: 24 U/L (ref 1–41)
AST SERPL-CCNC: 14 U/L (ref 1–40)
BASOPHILS # BLD AUTO: 0.03 10*3/MM3 (ref 0–0.2)
BASOPHILS NFR BLD AUTO: 0.4 % (ref 0–1.5)
BILIRUB SERPL-MCNC: 0.2 MG/DL (ref 0.1–1.2)
BUN SERPL-MCNC: 19 MG/DL (ref 6–20)
BUN/CREAT SERPL: 19.2 (ref 7–25)
C PEPTIDE SERPL-MCNC: 1.5 NG/ML (ref 1.1–4.4)
CALCIUM SERPL-MCNC: 10.7 MG/DL (ref 8.6–10.5)
CHLORIDE SERPL-SCNC: 103 MMOL/L (ref 98–107)
CHOLEST SERPL-MCNC: 146 MG/DL (ref 0–200)
CO2 SERPL-SCNC: 28.2 MMOL/L (ref 22–29)
CREAT SERPL-MCNC: 0.99 MG/DL (ref 0.76–1.27)
EOSINOPHIL # BLD AUTO: 0.37 10*3/MM3 (ref 0–0.7)
EOSINOPHIL NFR BLD AUTO: 4.6 % (ref 0.3–6.2)
ERYTHROCYTE [DISTWIDTH] IN BLOOD BY AUTOMATED COUNT: 12.2 % (ref 11.5–14.5)
FSH SERPL-ACNC: 2 MIU/ML (ref 1.5–12.4)
FT4I SERPL CALC-MCNC: 3.8 (ref 1.2–4.9)
GH SERPL-MCNC: 0.2 NG/ML (ref 0–10)
GLOBULIN SER CALC-MCNC: 3 GM/DL
GLUCOSE SERPL-MCNC: 247 MG/DL (ref 65–99)
HBA1C MFR BLD: 9.3 % (ref 4.8–5.6)
HCT VFR BLD AUTO: 47.7 % (ref 40.4–52.2)
HDLC SERPL-MCNC: 30 MG/DL (ref 40–60)
HGB BLD-MCNC: 16.3 G/DL (ref 13.7–17.6)
IGF-I SERPL-MCNC: 87 NG/ML (ref 54–194)
IMM GRANULOCYTES # BLD AUTO: 0.02 10*3/MM3 (ref 0–0.03)
IMM GRANULOCYTES NFR BLD AUTO: 0.3 % (ref 0–0.5)
INTERPRETATION: NORMAL
LDLC SERPL CALC-MCNC: 83 MG/DL (ref 0–100)
LH SERPL-ACNC: 2.6 MIU/ML (ref 1.7–8.6)
LYMPHOCYTES # BLD AUTO: 2.08 10*3/MM3 (ref 0.9–4.8)
LYMPHOCYTES NFR BLD AUTO: 26 % (ref 19.6–45.3)
Lab: NORMAL
MCH RBC QN AUTO: 29.3 PG (ref 27–32.7)
MCHC RBC AUTO-ENTMCNC: 34.2 G/DL (ref 32.6–36.4)
MCV RBC AUTO: 85.8 FL (ref 79.8–96.2)
MICROALBUMIN UR-MCNC: 3.6 UG/ML
MONOCYTES # BLD AUTO: 0.43 10*3/MM3 (ref 0.2–1.2)
MONOCYTES NFR BLD AUTO: 5.4 % (ref 5–12)
NEUTROPHILS # BLD AUTO: 5.09 10*3/MM3 (ref 1.9–8.1)
NEUTROPHILS NFR BLD AUTO: 63.6 % (ref 42.7–76)
PLATELET # BLD AUTO: 216 10*3/MM3 (ref 140–500)
POTASSIUM SERPL-SCNC: 4.7 MMOL/L (ref 3.5–5.2)
PROLACTIN SERPL-MCNC: 12.5 NG/ML (ref 4–15.2)
PROT SERPL-MCNC: 7.2 G/DL (ref 6–8.5)
PSA SERPL-MCNC: 0.28 NG/ML (ref 0–4)
RBC # BLD AUTO: 5.56 10*6/MM3 (ref 4.6–6)
SHBG SERPL-SCNC: 98.4 NMOL/L (ref 19.3–76.4)
SODIUM SERPL-SCNC: 143 MMOL/L (ref 136–145)
T3FREE SERPL-MCNC: 4.9 PG/ML (ref 2–4.4)
T3RU NFR SERPL: 38 % (ref 24–39)
T4 FREE SERPL-MCNC: 2.58 NG/DL (ref 0.93–1.7)
T4 SERPL-MCNC: 10 UG/DL (ref 4.5–12)
TESTOST FREE SERPL-MCNC: 23.5 PG/ML (ref 7.2–24)
TESTOST SERPL-MCNC: >1500 NG/DL (ref 264–916)
THYROGLOB AB SERPL-ACNC: <1 IU/ML
THYROGLOB AB SERPL-ACNC: <1 IU/ML (ref 0–0.9)
THYROGLOB SERPL-MCNC: <0.1 NG/ML
THYROGLOB SERPL-MCNC: NORMAL NG/ML
THYROPEROXIDASE AB SERPL-ACNC: 16 IU/ML (ref 0–34)
TRIGL SERPL-MCNC: 167 MG/DL (ref 0–150)
TSH SERPL DL<=0.005 MIU/L-ACNC: <0.006 UIU/ML (ref 0.45–4.5)
VLDLC SERPL CALC-MCNC: 33.4 MG/DL (ref 5–40)
WBC # BLD AUTO: 8 10*3/MM3 (ref 4.5–10.7)

## 2019-01-18 PROBLEM — E83.52 HYPERCALCEMIA: Status: ACTIVE | Noted: 2019-01-18

## 2019-01-29 ENCOUNTER — TELEPHONE (OUTPATIENT)
Dept: ENDOCRINOLOGY | Age: 59
End: 2019-01-29

## 2019-01-29 NOTE — TELEPHONE ENCOUNTER
Pt was called three times without being able to reach/    ----- Message from LOIS Vidal sent at 1/18/2019  9:12 AM EST -----  Pt needs to titrated tresiba per instructions in note

## 2019-02-08 ENCOUNTER — TELEPHONE (OUTPATIENT)
Dept: ENDOCRINOLOGY | Age: 59
End: 2019-02-08

## 2019-02-08 NOTE — TELEPHONE ENCOUNTER
rx was sent    ----- Message from Debby Galindo sent at 2/7/2019 11:49 AM EST -----  Contact: [phone]  Prescription is needed for test strips    Was given a new meter so doesn't know the name of the strips    Test 5 x daily  150 test strips a month   Send to     [facility] [address] [place] [address] - [phone]  - [phone] (Phone)  [phone] (Fax)      Please call grayson tovar  [phone]

## 2019-02-12 RX ORDER — BLOOD SUGAR DIAGNOSTIC
STRIP MISCELLANEOUS
Qty: 300 EACH | Refills: 1 | Status: SHIPPED | OUTPATIENT
Start: 2019-02-12 | End: 2019-02-18 | Stop reason: SDUPTHER

## 2019-02-18 RX ORDER — BLOOD SUGAR DIAGNOSTIC
STRIP MISCELLANEOUS
Qty: 300 EACH | Refills: 1 | Status: SHIPPED | OUTPATIENT
Start: 2019-02-18

## 2019-04-16 ENCOUNTER — TELEPHONE (OUTPATIENT)
Dept: ENDOCRINOLOGY | Age: 59
End: 2019-04-16

## 2019-04-16 NOTE — TELEPHONE ENCOUNTER
rx sent    ----- Message from Carmen Faulkner MA sent at 4/16/2019  3:12 PM EDT -----  Contact: WIFE  PT WIFE CALLED REQUESTING A REFILL SAMUEL . RUSTY BARRIGA

## 2019-05-14 ENCOUNTER — RESULTS ENCOUNTER (OUTPATIENT)
Dept: ENDOCRINOLOGY | Age: 59
End: 2019-05-14

## 2019-05-14 DIAGNOSIS — E55.9 VITAMIN D DEFICIENCY: ICD-10-CM

## 2019-05-14 DIAGNOSIS — E23.0 PANHYPOPITUITARISM (HCC): ICD-10-CM

## 2019-05-14 DIAGNOSIS — Z91.14 NONADHERENCE TO MEDICATION: ICD-10-CM

## 2019-05-14 DIAGNOSIS — E29.1 HYPOGONADISM IN MALE: ICD-10-CM

## 2019-05-14 DIAGNOSIS — E11.42 DIABETIC PERIPHERAL NEUROPATHY (HCC): ICD-10-CM

## 2019-05-14 DIAGNOSIS — F52.21 ERECTILE DYSFUNCTION OF NONORGANIC ORIGIN: ICD-10-CM

## 2019-05-14 DIAGNOSIS — I10 BENIGN ESSENTIAL HTN: ICD-10-CM

## 2019-05-14 DIAGNOSIS — E11.65 UNCONTROLLED TYPE 2 DIABETES MELLITUS WITH HYPERGLYCEMIA (HCC): ICD-10-CM

## 2019-06-06 ENCOUNTER — OFFICE VISIT CONVERTED (OUTPATIENT)
Dept: UROLOGY | Facility: CLINIC | Age: 59
End: 2019-06-06
Attending: UROLOGY

## 2019-07-31 ENCOUNTER — OFFICE VISIT CONVERTED (OUTPATIENT)
Dept: NEUROSURGERY | Facility: CLINIC | Age: 59
End: 2019-07-31
Attending: NEUROLOGICAL SURGERY

## 2019-08-09 ENCOUNTER — HOSPITAL ENCOUNTER (OUTPATIENT)
Dept: MRI IMAGING | Facility: HOSPITAL | Age: 59
Discharge: HOME | End: 2019-08-09
Attending: NEUROLOGICAL SURGERY

## 2019-08-13 RX ORDER — SPIRONOLACTONE 25 MG/1
TABLET ORAL
Qty: 90 TABLET | Refills: 1 | OUTPATIENT
Start: 2019-08-13

## 2019-08-21 ENCOUNTER — OFFICE VISIT CONVERTED (OUTPATIENT)
Dept: NEUROSURGERY | Facility: CLINIC | Age: 59
End: 2019-08-21
Attending: NEUROLOGICAL SURGERY

## 2019-10-14 ENCOUNTER — CONVERSION ENCOUNTER (OUTPATIENT)
Dept: CARDIOLOGY | Facility: CLINIC | Age: 59
End: 2019-10-14

## 2019-10-14 ENCOUNTER — OFFICE VISIT CONVERTED (OUTPATIENT)
Dept: CARDIOLOGY | Facility: CLINIC | Age: 59
End: 2019-10-14
Attending: INTERNAL MEDICINE

## 2020-01-08 ENCOUNTER — HOSPITAL ENCOUNTER (OUTPATIENT)
Dept: OTHER | Facility: HOSPITAL | Age: 60
Discharge: HOME | End: 2020-01-08
Attending: NURSE PRACTITIONER

## 2020-01-08 LAB
ALBUMIN SERPL-MCNC: 4.3 G/DL (ref 3.5–5)
ALBUMIN/GLOB SERPL: 1.4 {RATIO} (ref 1.4–2.6)
ALP SERPL-CCNC: 100 U/L (ref 56–119)
ALT SERPL-CCNC: 46 U/L (ref 10–40)
ANION GAP SERPL CALC-SCNC: 14 MMOL/L (ref 8–19)
AST SERPL-CCNC: 32 U/L (ref 15–50)
BILIRUB SERPL-MCNC: 0.21 MG/DL (ref 0.2–1.3)
BNP SERPL-MCNC: 65 PG/ML (ref 0–900)
BUN SERPL-MCNC: 10 MG/DL (ref 5–25)
BUN/CREAT SERPL: 9 {RATIO} (ref 6–20)
CALCIUM SERPL-MCNC: 9.6 MG/DL (ref 8.7–10.4)
CHLORIDE SERPL-SCNC: 100 MMOL/L (ref 99–111)
CHOLEST SERPL-MCNC: 136 MG/DL (ref 107–200)
CHOLEST/HDLC SERPL: 3.4 {RATIO} (ref 3–6)
CONV CO2: 27 MMOL/L (ref 22–32)
CONV TOTAL PROTEIN: 7.3 G/DL (ref 6.3–8.2)
CREAT UR-MCNC: 1.11 MG/DL (ref 0.7–1.2)
GFR SERPLBLD BASED ON 1.73 SQ M-ARVRAT: >60 ML/MIN/{1.73_M2}
GLOBULIN UR ELPH-MCNC: 3 G/DL (ref 2–3.5)
GLUCOSE SERPL-MCNC: 191 MG/DL (ref 70–99)
HDLC SERPL-MCNC: 40 MG/DL (ref 40–60)
LDLC SERPL CALC-MCNC: 73 MG/DL (ref 70–100)
OSMOLALITY SERPL CALC.SUM OF ELEC: 288 MOSM/KG (ref 273–304)
POTASSIUM SERPL-SCNC: 4.4 MMOL/L (ref 3.5–5.3)
SODIUM SERPL-SCNC: 137 MMOL/L (ref 135–147)
TRIGL SERPL-MCNC: 115 MG/DL (ref 40–150)
VLDLC SERPL-MCNC: 23 MG/DL (ref 5–37)

## 2020-01-14 ENCOUNTER — APPOINTMENT (OUTPATIENT)
Dept: GENERAL RADIOLOGY | Facility: HOSPITAL | Age: 60
End: 2020-01-14

## 2020-01-14 ENCOUNTER — HOSPITAL ENCOUNTER (OUTPATIENT)
Facility: HOSPITAL | Age: 60
Setting detail: OBSERVATION
Discharge: HOME | End: 2020-01-15
Attending: EMERGENCY MEDICINE | Admitting: HOSPITALIST

## 2020-01-14 DIAGNOSIS — R27.0 ATAXIA: ICD-10-CM

## 2020-01-14 DIAGNOSIS — R42 DIZZINESS: Primary | ICD-10-CM

## 2020-01-14 LAB
ALBUMIN SERPL-MCNC: 4.4 G/DL (ref 3.5–5.2)
ALBUMIN/GLOB SERPL: 1.4 G/DL
ALP SERPL-CCNC: 99 U/L (ref 39–117)
ALT SERPL W P-5'-P-CCNC: 40 U/L (ref 1–41)
ANION GAP SERPL CALCULATED.3IONS-SCNC: 11.7 MMOL/L (ref 5–15)
AST SERPL-CCNC: 21 U/L (ref 1–40)
BASOPHILS # BLD AUTO: 0.08 10*3/MM3 (ref 0–0.2)
BASOPHILS NFR BLD AUTO: 0.9 % (ref 0–1.5)
BILIRUB SERPL-MCNC: 0.4 MG/DL (ref 0.2–1.2)
BILIRUB UR QL STRIP: NEGATIVE
BUN BLD-MCNC: 12 MG/DL (ref 6–20)
BUN/CREAT SERPL: 10.6 (ref 7–25)
CALCIUM SPEC-SCNC: 9.7 MG/DL (ref 8.6–10.5)
CHLORIDE SERPL-SCNC: 104 MMOL/L (ref 98–107)
CLARITY UR: CLEAR
CO2 SERPL-SCNC: 24.3 MMOL/L (ref 22–29)
COLOR UR: YELLOW
CREAT BLD-MCNC: 1.13 MG/DL (ref 0.76–1.27)
DEPRECATED RDW RBC AUTO: 46 FL (ref 37–54)
EOSINOPHIL # BLD AUTO: 1.02 10*3/MM3 (ref 0–0.4)
EOSINOPHIL NFR BLD AUTO: 11.8 % (ref 0.3–6.2)
ERYTHROCYTE [DISTWIDTH] IN BLOOD BY AUTOMATED COUNT: 13.9 % (ref 12.3–15.4)
GFR SERPL CREATININE-BSD FRML MDRD: 66 ML/MIN/1.73
GLOBULIN UR ELPH-MCNC: 3.1 GM/DL
GLUCOSE BLD-MCNC: 219 MG/DL (ref 65–99)
GLUCOSE BLDC GLUCOMTR-MCNC: 192 MG/DL (ref 70–130)
GLUCOSE BLDC GLUCOMTR-MCNC: 83 MG/DL (ref 70–130)
GLUCOSE UR STRIP-MCNC: ABNORMAL MG/DL
HCT VFR BLD AUTO: 46.8 % (ref 37.5–51)
HGB BLD-MCNC: 15.1 G/DL (ref 13–17.7)
HGB UR QL STRIP.AUTO: NEGATIVE
HOLD SPECIMEN: NORMAL
HOLD SPECIMEN: NORMAL
IMM GRANULOCYTES # BLD AUTO: 0.02 10*3/MM3 (ref 0–0.05)
IMM GRANULOCYTES NFR BLD AUTO: 0.2 % (ref 0–0.5)
KETONES UR QL STRIP: NEGATIVE
LEUKOCYTE ESTERASE UR QL STRIP.AUTO: NEGATIVE
LIPASE SERPL-CCNC: 53 U/L (ref 13–60)
LYMPHOCYTES # BLD AUTO: 1.77 10*3/MM3 (ref 0.7–3.1)
LYMPHOCYTES NFR BLD AUTO: 20.6 % (ref 19.6–45.3)
MCH RBC QN AUTO: 29.4 PG (ref 26.6–33)
MCHC RBC AUTO-ENTMCNC: 32.3 G/DL (ref 31.5–35.7)
MCV RBC AUTO: 91.2 FL (ref 79–97)
MONOCYTES # BLD AUTO: 0.56 10*3/MM3 (ref 0.1–0.9)
MONOCYTES NFR BLD AUTO: 6.5 % (ref 5–12)
NEUTROPHILS # BLD AUTO: 5.16 10*3/MM3 (ref 1.7–7)
NEUTROPHILS NFR BLD AUTO: 60 % (ref 42.7–76)
NITRITE UR QL STRIP: NEGATIVE
NRBC BLD AUTO-RTO: 0 /100 WBC (ref 0–0.2)
PH UR STRIP.AUTO: 5.5 [PH] (ref 5–8)
PLATELET # BLD AUTO: 209 10*3/MM3 (ref 140–450)
PMV BLD AUTO: 9.9 FL (ref 6–12)
POTASSIUM BLD-SCNC: 4.3 MMOL/L (ref 3.5–5.2)
PROT SERPL-MCNC: 7.5 G/DL (ref 6–8.5)
PROT UR QL STRIP: NEGATIVE
RBC # BLD AUTO: 5.13 10*6/MM3 (ref 4.14–5.8)
SODIUM BLD-SCNC: 140 MMOL/L (ref 136–145)
SP GR UR STRIP: >=1.03 (ref 1–1.03)
TROPONIN T SERPL-MCNC: <0.01 NG/ML (ref 0–0.03)
UROBILINOGEN UR QL STRIP: ABNORMAL
WBC NRBC COR # BLD: 8.61 10*3/MM3 (ref 3.4–10.8)
WHOLE BLOOD HOLD SPECIMEN: NORMAL
WHOLE BLOOD HOLD SPECIMEN: NORMAL

## 2020-01-14 PROCEDURE — 85025 COMPLETE CBC W/AUTO DIFF WBC: CPT

## 2020-01-14 PROCEDURE — 96361 HYDRATE IV INFUSION ADD-ON: CPT

## 2020-01-14 PROCEDURE — 82962 GLUCOSE BLOOD TEST: CPT

## 2020-01-14 PROCEDURE — G0378 HOSPITAL OBSERVATION PER HR: HCPCS

## 2020-01-14 PROCEDURE — 25010000002 ONDANSETRON PER 1 MG: Performed by: NURSE PRACTITIONER

## 2020-01-14 PROCEDURE — 84484 ASSAY OF TROPONIN QUANT: CPT

## 2020-01-14 PROCEDURE — 80053 COMPREHEN METABOLIC PANEL: CPT

## 2020-01-14 PROCEDURE — 83690 ASSAY OF LIPASE: CPT

## 2020-01-14 PROCEDURE — 63710000001 INSULIN LISPRO (HUMAN) PER 5 UNITS: Performed by: INTERNAL MEDICINE

## 2020-01-14 PROCEDURE — 93010 ELECTROCARDIOGRAM REPORT: CPT | Performed by: INTERNAL MEDICINE

## 2020-01-14 PROCEDURE — 93005 ELECTROCARDIOGRAM TRACING: CPT

## 2020-01-14 PROCEDURE — 99284 EMERGENCY DEPT VISIT MOD MDM: CPT

## 2020-01-14 PROCEDURE — 96374 THER/PROPH/DIAG INJ IV PUSH: CPT

## 2020-01-14 PROCEDURE — 93005 ELECTROCARDIOGRAM TRACING: CPT | Performed by: EMERGENCY MEDICINE

## 2020-01-14 PROCEDURE — 81003 URINALYSIS AUTO W/O SCOPE: CPT | Performed by: EMERGENCY MEDICINE

## 2020-01-14 PROCEDURE — 71046 X-RAY EXAM CHEST 2 VIEWS: CPT

## 2020-01-14 RX ORDER — ONDANSETRON 2 MG/ML
4 INJECTION INTRAMUSCULAR; INTRAVENOUS ONCE
Status: COMPLETED | OUTPATIENT
Start: 2020-01-14 | End: 2020-01-14

## 2020-01-14 RX ORDER — MECLIZINE HYDROCHLORIDE 25 MG/1
25 TABLET ORAL EVERY 8 HOURS SCHEDULED
Status: DISCONTINUED | OUTPATIENT
Start: 2020-01-14 | End: 2020-01-15 | Stop reason: HOSPADM

## 2020-01-14 RX ORDER — SODIUM CHLORIDE 0.9 % (FLUSH) 0.9 %
10 SYRINGE (ML) INJECTION EVERY 12 HOURS SCHEDULED
Status: DISCONTINUED | OUTPATIENT
Start: 2020-01-14 | End: 2020-01-15

## 2020-01-14 RX ORDER — TAMSULOSIN HYDROCHLORIDE 0.4 MG/1
0.4 CAPSULE ORAL DAILY
Status: DISCONTINUED | OUTPATIENT
Start: 2020-01-15 | End: 2020-01-15 | Stop reason: HOSPADM

## 2020-01-14 RX ORDER — ALLOPURINOL 300 MG/1
300 TABLET ORAL DAILY
Status: DISCONTINUED | OUTPATIENT
Start: 2020-01-15 | End: 2020-01-15 | Stop reason: HOSPADM

## 2020-01-14 RX ORDER — ASPIRIN 81 MG/1
81 TABLET ORAL DAILY
Status: DISCONTINUED | OUTPATIENT
Start: 2020-01-15 | End: 2020-01-15 | Stop reason: HOSPADM

## 2020-01-14 RX ORDER — DEXTROSE MONOHYDRATE 25 G/50ML
25 INJECTION, SOLUTION INTRAVENOUS
Status: DISCONTINUED | OUTPATIENT
Start: 2020-01-14 | End: 2020-01-15 | Stop reason: HOSPADM

## 2020-01-14 RX ORDER — ONDANSETRON 2 MG/ML
4 INJECTION INTRAMUSCULAR; INTRAVENOUS EVERY 6 HOURS PRN
Status: DISCONTINUED | OUTPATIENT
Start: 2020-01-14 | End: 2020-01-15 | Stop reason: HOSPADM

## 2020-01-14 RX ORDER — SPIRONOLACTONE 25 MG/1
25 TABLET ORAL DAILY
Status: DISCONTINUED | OUTPATIENT
Start: 2020-01-15 | End: 2020-01-15 | Stop reason: HOSPADM

## 2020-01-14 RX ORDER — SODIUM CHLORIDE 0.9 % (FLUSH) 0.9 %
10 SYRINGE (ML) INJECTION AS NEEDED
Status: DISCONTINUED | OUTPATIENT
Start: 2020-01-14 | End: 2020-01-15

## 2020-01-14 RX ORDER — CARVEDILOL 12.5 MG/1
12.5 TABLET ORAL 2 TIMES DAILY
Status: DISCONTINUED | OUTPATIENT
Start: 2020-01-14 | End: 2020-01-15 | Stop reason: HOSPADM

## 2020-01-14 RX ORDER — FAMOTIDINE 20 MG/1
20 TABLET, FILM COATED ORAL DAILY
Status: DISCONTINUED | OUTPATIENT
Start: 2020-01-15 | End: 2020-01-15 | Stop reason: HOSPADM

## 2020-01-14 RX ORDER — ATORVASTATIN CALCIUM 20 MG/1
40 TABLET, FILM COATED ORAL DAILY
Status: DISCONTINUED | OUTPATIENT
Start: 2020-01-15 | End: 2020-01-15

## 2020-01-14 RX ORDER — LEVOTHYROXINE SODIUM 0.1 MG/1
200 TABLET ORAL
Status: DISCONTINUED | OUTPATIENT
Start: 2020-01-15 | End: 2020-01-15 | Stop reason: HOSPADM

## 2020-01-14 RX ORDER — LISINOPRIL 10 MG/1
10 TABLET ORAL DAILY
Status: DISCONTINUED | OUTPATIENT
Start: 2020-01-15 | End: 2020-01-15 | Stop reason: HOSPADM

## 2020-01-14 RX ORDER — ANASTROZOLE 1 MG/1
1 TABLET ORAL DAILY
Status: DISCONTINUED | OUTPATIENT
Start: 2020-01-15 | End: 2020-01-14

## 2020-01-14 RX ORDER — SODIUM CHLORIDE 0.9 % (FLUSH) 0.9 %
10 SYRINGE (ML) INJECTION AS NEEDED
Status: DISCONTINUED | OUTPATIENT
Start: 2020-01-14 | End: 2020-01-15 | Stop reason: HOSPADM

## 2020-01-14 RX ORDER — NICOTINE POLACRILEX 4 MG
15 LOZENGE BUCCAL
Status: DISCONTINUED | OUTPATIENT
Start: 2020-01-14 | End: 2020-01-15 | Stop reason: HOSPADM

## 2020-01-14 RX ORDER — SODIUM CHLORIDE 9 MG/ML
75 INJECTION, SOLUTION INTRAVENOUS CONTINUOUS
Status: DISCONTINUED | OUTPATIENT
Start: 2020-01-14 | End: 2020-01-15 | Stop reason: HOSPADM

## 2020-01-14 RX ADMIN — SODIUM CHLORIDE 75 ML/HR: 9 INJECTION, SOLUTION INTRAVENOUS at 22:46

## 2020-01-14 RX ADMIN — ONDANSETRON 4 MG: 2 INJECTION INTRAMUSCULAR; INTRAVENOUS at 15:08

## 2020-01-14 RX ADMIN — INSULIN LISPRO 2 UNITS: 100 INJECTION, SOLUTION INTRAVENOUS; SUBCUTANEOUS at 22:45

## 2020-01-14 RX ADMIN — MECLIZINE HYDROCHLORIDE 25 MG: 25 TABLET ORAL at 22:45

## 2020-01-14 RX ADMIN — CARVEDILOL 12.5 MG: 12.5 TABLET, FILM COATED ORAL at 22:49

## 2020-01-14 RX ADMIN — SODIUM CHLORIDE 1000 ML: 9 INJECTION, SOLUTION INTRAVENOUS at 15:03

## 2020-01-14 RX ADMIN — SODIUM CHLORIDE, PRESERVATIVE FREE 10 ML: 5 INJECTION INTRAVENOUS at 22:46

## 2020-01-14 RX ADMIN — SODIUM CHLORIDE, PRESERVATIVE FREE 10 ML: 5 INJECTION INTRAVENOUS at 13:32

## 2020-01-14 NOTE — ED TRIAGE NOTES
While doing pt's EKG pt was very unsteady when transferring from wheelchair to stretcher. Pt states that he had a CT of his abdomen done on Sunday which showed a hernia and a 2.5mm AAA. Reports that he had a head CT done this morning that was negative. Pt states that he feels like he cannot control his legs. Denies any weakness to his legs.

## 2020-01-14 NOTE — ED NOTES
Patient attempts to urinate at this time, cannot provide sample. Will attempt again after IV fluids     Aly Moore RN  01/14/20 [id number]

## 2020-01-14 NOTE — ED NOTES
Patient reports he cannot urinate at this time, will try later.     Aly Moore, RN  01/14/20 0407

## 2020-01-14 NOTE — ED PROVIDER NOTES
EMERGENCY DEPARTMENT ENCOUNTER    Room Number:  19/19  Date seen:  1/14/2020  Time seen: 1:31 PM  PCP: Marco Nicholson MD    HPI:  Chief complaint: Dizziness  Context:Narciso Mejía is a 59 y.o. malewith hx of MI (treats with Lisinopril and ASA) who presents to the ED with c/o gradual onset, worsening, intermittent episodes (every 2-3 days) of dizziness over the last couple of months. Sx are positionally exacerbated. He also c/o headaches, loss of coordination, mid abdominal pain, ear ringing, palpitations, visual disturbances, and nausea/vomiting r/t headaches. He has attempted to alleviate nausea with Zofran without success. Pt was seen at [facility] ([facility]) 3 days ago. CT abdomen at that time showed hernia and 2.5mm dilation of aorta but no dissection. Pt followed up with Dr. Nicholson, PCP, yesterday where CT head was negative and he recommended he go to the ED for further work up. No other complaints at this time.     Onset: gradual  Location:neuro  Radiation: none  Duration: couple months  Timing: intermittent  Character:dizziness  Aggravating Factors: positional  Alleviating Factors: none  Severity: moderate    ALLERGIES  Bacitracin-neomycin-polymyxin; Bydureon [exenatide]; and Penicillins    PAST MEDICAL HISTORY  Active Ambulatory Problems     Diagnosis Date Noted   • Diabetes mellitus (CMS/[facility]) 10/09/2018   • Erectile dysfunction of nonorganic origin 10/09/2018   • Hypogonadism in male 10/09/2018   • Hypothyroidism 10/09/2018   • Memory loss 10/09/2018   • Panhypopituitarism (CMS/[facility]) 10/09/2018   • Uncontrolled type 2 diabetes mellitus (CMS/[facility]) 10/09/2018   • Attention disturbance 10/09/2018   • Vitamin D deficiency  10/09/2018   • Benign essential HTN 10/09/2018   • Diabetic peripheral neuropathy (CMS/[facility]) 10/09/2018   • Alkaline phosphatase elevation 10/15/2018   • Nonadherence to medication 01/14/2019   • Hypercalcemia 01/18/2019     Resolved Ambulatory Problems     Diagnosis  Date Noted   • No Resolved Ambulatory Problems     Past Medical History:   Diagnosis Date   • Arthritis    • Bronchitis    • Crohn's colitis (CMS/HCC)    • Disease of thyroid gland    • GERD (gastroesophageal reflux disease)    • Gout    • Hiatal hernia    • High cholesterol    • Hypertension    • Liver disease    • Myocardial infarction (CMS/HCC)    • Peptic ulceration    • 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CMS/HCC)    • Pneumonia    • Testosterone deficiency    • Testosterone deficiency in male        PAST SURGICAL HISTORY  Past Surgical History:   Procedure Laterality Date   • APPENDECTOMY     • COLONOSCOPY     • CORONARY ANGIOPLASTY WITH STENT PLACEMENT     • ESOPHAGUS SURGERY     • HERNIA REPAIR     • POLYPECTOMY     • TONSILECTOMY, ADENOIDECTOMY, BILATERAL MYRINGOTOMY AND TUBES         FAMILY HISTORY  History reviewed. No pertinent family history.    SOCIAL HISTORY  Social History     Socioeconomic History   • Marital status:      Spouse name: Not on file   • Number of children: Not on file   • Years of education: Not on file   • Highest education level: Not on file   Tobacco Use   • Smoking status: Former Smoker     Packs/day: 2.00     Years: 24.00     Pack years: 48.00     Types: Cigarettes   • Smokeless tobacco: Never Used   • Tobacco comment: 19 yeats ago   Substance and Sexual Activity   • Alcohol use: No   • Drug use: No   • Sexual activity: Defer       REVIEW OF SYSTEMS  Review of Systems   Constitutional: Negative for chills and fever.   HENT: Positive for ear pain (ringing).    Eyes: Positive for visual disturbance.   Respiratory: Negative for shortness of breath.    Cardiovascular: Positive for palpitations. Negative for chest pain.   Gastrointestinal: Positive for abdominal pain, nausea and vomiting.   Genitourinary: Negative for dysuria and hematuria.   Musculoskeletal: Negative.    Skin: Negative for rash.   Neurological: Positive for dizziness and headaches. Negative for syncope and weakness.    Psychiatric/Behavioral: Negative.  Negative for confusion.       PHYSICAL EXAM  ED Triage Vitals   Temp Heart Rate Resp BP SpO2   01/14/20 1305 01/14/20 1305 01/14/20 1305 01/14/20 1316 01/14/20 1305   97 °F (36.1 °C) 64 16 127/94 99 %      Temp src Heart Rate Source Patient Position BP Location FiO2 (%)   01/14/20 1305 01/14/20 1305 -- -- --   Tympanic Monitor        Physical Exam   Constitutional: He is oriented to person, place, and time and well-developed, well-nourished, and in no distress. No distress.   HENT:   Head: Normocephalic and atraumatic.   Mouth/Throat: Mucous membranes are normal.   Eyes: Pupils are equal, round, and reactive to light. Right eye exhibits nystagmus. Left eye exhibits nystagmus.   Neck: Normal range of motion. Neck supple.   Cardiovascular: Normal rate, regular rhythm and normal heart sounds.   Pulmonary/Chest: Effort normal and breath sounds normal. No respiratory distress.   Abdominal: Soft. There is no tenderness. There is no rebound and no guarding.   Neurological: He is alert and oriented to person, place, and time. He has normal strength.   NIH 0  No focal neuro deficits  Pt reports feeling off balance and stumbled when attempted to stand.   Skin: Skin is warm, dry and intact.   Psychiatric: Mood, affect and judgment normal.   Nursing note and vitals reviewed.      LAB RESULTS  Recent Results (from the past 24 hour(s))   Light Blue Top    Collection Time: 01/14/20  1:31 PM   Result Value Ref Range    Extra Tube hold for add-on    Green Top (Gel)    Collection Time: 01/14/20  1:31 PM   Result Value Ref Range    Extra Tube Hold for add-ons.    Lavender Top    Collection Time: 01/14/20  1:31 PM   Result Value Ref Range    Extra Tube hold for add-on    Gold Top - SST    Collection Time: 01/14/20  1:31 PM   Result Value Ref Range    Extra Tube Hold for add-ons.    Comprehensive Metabolic Panel    Collection Time: 01/14/20  1:31 PM   Result Value Ref Range    Glucose 219 (H) 65 - 99  mg/dL    BUN 12 6 - 20 mg/dL    Creatinine 1.13 0.76 - 1.27 mg/dL    Sodium 140 136 - 145 mmol/L    Potassium 4.3 3.5 - 5.2 mmol/L    Chloride 104 98 - 107 mmol/L    CO2 24.3 22.0 - 29.0 mmol/L    Calcium 9.7 8.6 - 10.5 mg/dL    Total Protein 7.5 6.0 - 8.5 g/dL    Albumin 4.40 3.50 - 5.20 g/dL    ALT (SGPT) 40 1 - 41 U/L    AST (SGOT) 21 1 - 40 U/L    Alkaline Phosphatase 99 39 - 117 U/L    Total Bilirubin 0.4 0.2 - 1.2 mg/dL    eGFR Non African Amer 66 >60 mL/min/1.73    Globulin 3.1 gm/dL    A/G Ratio 1.4 g/dL    BUN/Creatinine Ratio 10.6 7.0 - 25.0    Anion Gap 11.7 5.0 - 15.0 mmol/L   Lipase    Collection Time: 01/14/20  1:31 PM   Result Value Ref Range    Lipase 53 13 - 60 U/L   Troponin    Collection Time: 01/14/20  1:31 PM   Result Value Ref Range    Troponin T <0.010 0.000 - 0.030 ng/mL   CBC Auto Differential    Collection Time: 01/14/20  1:31 PM   Result Value Ref Range    WBC 8.61 3.40 - 10.80 10*3/mm3    RBC 5.13 4.14 - 5.80 10*6/mm3    Hemoglobin 15.1 13.0 - 17.7 g/dL    Hematocrit 46.8 37.5 - 51.0 %    MCV 91.2 79.0 - 97.0 fL    MCH 29.4 26.6 - 33.0 pg    MCHC 32.3 31.5 - 35.7 g/dL    RDW 13.9 12.3 - 15.4 %    RDW-SD 46.0 37.0 - 54.0 fl    MPV 9.9 6.0 - 12.0 fL    Platelets 209 140 - 450 10*3/mm3    Neutrophil % 60.0 42.7 - 76.0 %    Lymphocyte % 20.6 19.6 - 45.3 %    Monocyte % 6.5 5.0 - 12.0 %    Eosinophil % 11.8 (H) 0.3 - 6.2 %    Basophil % 0.9 0.0 - 1.5 %    Immature Grans % 0.2 0.0 - 0.5 %    Neutrophils, Absolute 5.16 1.70 - 7.00 10*3/mm3    Lymphocytes, Absolute 1.77 0.70 - 3.10 10*3/mm3    Monocytes, Absolute 0.56 0.10 - 0.90 10*3/mm3    Eosinophils, Absolute 1.02 (H) 0.00 - 0.40 10*3/mm3    Basophils, Absolute 0.08 0.00 - 0.20 10*3/mm3    Immature Grans, Absolute 0.02 0.00 - 0.05 10*3/mm3    nRBC 0.0 0.0 - 0.2 /100 WBC       I ordered the above labs and reviewed the results    RADIOLOGY  XR Chest 2 View   Final Result   No focal pulmonary consolidation. Indeterminate nodular   density at the  "left base, follow-up evaluation recommended.       This report was finalized on 1/14/2020 3:28 PM by Dr. Amarjit Lemus M.D.              I ordered the above noted radiological studies and reviewed the images on the PACS system.      MEDICATIONS GIVEN IN ER  Medications   sodium chloride 0.9 % flush 10 mL (10 mL Intravenous Given 1/14/20 1332)   sodium chloride 0.9 % flush 10 mL (has no administration in time range)   ondansetron (ZOFRAN) injection 4 mg (4 mg Intravenous Given 1/14/20 1508)   sodium chloride 0.9 % bolus 1,000 mL (0 mL Intravenous Stopped 1/14/20 1600)       EKG  Interpreted by ED Physician.    PROCEDURES  Procedures      PROGRESS AND CONSULTS    Progress Notes:           1640: Discussed the patient and plan of care with Dr. Arteaga. After a bedside evaluation; they agree with the plan of care.     -pt reports PCP informed him of negative head CT today, attmpting to get records from outpatient imaging center, have not received them yet.    1635: Patient rechecked. Patient updated on ER findings, concerns, and need for admission.They are agreeable to admission and patient's questions answered. The doctor and I have reviewed and discussed the patient, we agree the patient should be admitted for further management, evaluation, and treatment.      CONSULTS  1705: Phone call with Dr Garcia with [facility]. Discussed the patient, relevant history, exam, diagnostics, ED findings/progress, and concerns.  He agrees to admit the patient.      Disposition vitals:  /91   Pulse 62   Temp 97 °F (36.1 °C) (Tympanic)   Resp 16   Ht 188 cm (74\")   Wt 96.2 kg (212 lb)   SpO2 100%   BMI 27.22 kg/m²       DIAGNOSIS  Final diagnoses:   Dizziness   Ataxia     Documentation assistance provided by LOIS Weiss for Kalyani ABREU.  Information recorded by the juana was done at my direction and has been verified and validated by me.           Shannan Marinelli  01/14/20 1500       Kalyani Lyle, " APRN  01/14/20 1753

## 2020-01-14 NOTE — ED TRIAGE NOTES
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  Off and on x 3 months.  This episode has been going on since Sunday.  Seen at [facility] and lipase was 589 and they sent him home

## 2020-01-14 NOTE — ED PROVIDER NOTES
Pt presents to the ED c/o gradually worsening episodes of dizziness for the past couple of months.  He states that his episodes have become more frequent and are lasting longer than they were originally.  He also confirms HA, decreased coordination, tinnitus, and nausea.      On exam,   GENERAL: A&Ox3, NAD  EYES: PERRL, EOMI, nystagmus   HENT: moist mucous membranes.  Normocephalic and atraumatic   HEART: RRR  LUNGS: CTAB.   ABD: soft, nontender, nondistended  MSK: No pedal edema.    NEURO: Normal neuro exam, with normal finger nose and normal heel shin.      EKG    EKG time: 1310  Rhythm/Rate: NSR/58  Old anterior infarct  No Acute Ischemia  Non-Specific ST-T changes    No prior EKG for comparison.      Interpreted Contemporaneously by me.  Independently viewed by me      Reviewed patient's workup. Labs were unremarkable.  Outpatient CT head was negative per his PCP.      I agree with the plan for admission for his dizziness and ataxia.        Attestation:  The AMINAH and I have discussed this patient's history, physical exam, and treatment plan. I have reviewed the documentation and personally had a face to face interaction with the patient. I affirm the documentation and agree with the treatment and plan. The attached note describes my personal findings.    Documentation assistance provided by juana Maya for Dr. Jenni MD. Information recorded by the scribe was done at my direction and has been verified and validated by me.       Gonzalez Maya  01/14/20 6719       Julio Cesar Arteaga MD  01/14/20 7505

## 2020-01-15 ENCOUNTER — APPOINTMENT (OUTPATIENT)
Dept: CARDIOLOGY | Facility: HOSPITAL | Age: 60
End: 2020-01-15

## 2020-01-15 ENCOUNTER — APPOINTMENT (OUTPATIENT)
Dept: MRI IMAGING | Facility: HOSPITAL | Age: 60
End: 2020-01-15

## 2020-01-15 VITALS
WEIGHT: 212 LBS | HEIGHT: 74 IN | SYSTOLIC BLOOD PRESSURE: 110 MMHG | BODY MASS INDEX: 27.21 KG/M2 | HEART RATE: 74 BPM | RESPIRATION RATE: 16 BRPM | OXYGEN SATURATION: 97 % | DIASTOLIC BLOOD PRESSURE: 71 MMHG | TEMPERATURE: 98 F

## 2020-01-15 LAB
ALBUMIN SERPL-MCNC: 3.6 G/DL (ref 3.5–5.2)
ALBUMIN/GLOB SERPL: 1.2 G/DL
ALP SERPL-CCNC: 73 U/L (ref 39–117)
ALT SERPL W P-5'-P-CCNC: 32 U/L (ref 1–41)
ANION GAP SERPL CALCULATED.3IONS-SCNC: 10.4 MMOL/L (ref 5–15)
AORTIC DIMENSIONLESS INDEX: 0.9 (DI)
AST SERPL-CCNC: 16 U/L (ref 1–40)
BH CV ECHO MEAS - ACS: 2 CM
BH CV ECHO MEAS - AO MAX PG: 4 MMHG
BH CV ECHO MEAS - AO MEAN PG (FULL): 0 MMHG
BH CV ECHO MEAS - AO MEAN PG: 2 MMHG
BH CV ECHO MEAS - AO ROOT AREA (BSA CORRECTED): 1.5
BH CV ECHO MEAS - AO ROOT AREA: 9.1 CM^2
BH CV ECHO MEAS - AO ROOT DIAM: 3.4 CM
BH CV ECHO MEAS - AO V2 MAX: 93 CM/SEC
BH CV ECHO MEAS - AO V2 MEAN: 69 CM/SEC
BH CV ECHO MEAS - AO V2 VTI: 21.3 CM
BH CV ECHO MEAS - AVA(I,A): 3.4 CM^2
BH CV ECHO MEAS - AVA(I,D): 3.4 CM^2
BH CV ECHO MEAS - BSA(HAYCOCK): 2.3 M^2
BH CV ECHO MEAS - BSA: 2.2 M^2
BH CV ECHO MEAS - BZI_BMI: 27.2 KILOGRAMS/M^2
BH CV ECHO MEAS - BZI_METRIC_HEIGHT: 188 CM
BH CV ECHO MEAS - BZI_METRIC_WEIGHT: 96.2 KG
BH CV ECHO MEAS - EDV(CUBED): 74.1 ML
BH CV ECHO MEAS - EDV(MOD-SP2): 116 ML
BH CV ECHO MEAS - EDV(MOD-SP4): 125 ML
BH CV ECHO MEAS - EDV(TEICH): 78.6 ML
BH CV ECHO MEAS - EF(CUBED): 59.8 %
BH CV ECHO MEAS - EF(MOD-SP2): 37.1 %
BH CV ECHO MEAS - EF(MOD-SP4): 40 %
BH CV ECHO MEAS - EF(TEICH): 51.7 %
BH CV ECHO MEAS - ESV(CUBED): 29.8 ML
BH CV ECHO MEAS - ESV(MOD-SP2): 73 ML
BH CV ECHO MEAS - ESV(MOD-SP4): 75 ML
BH CV ECHO MEAS - ESV(TEICH): 37.9 ML
BH CV ECHO MEAS - FS: 26.2 %
BH CV ECHO MEAS - IVS/LVPW: 0.82
BH CV ECHO MEAS - IVSD: 0.9 CM
BH CV ECHO MEAS - LA DIMENSION: 3.6 CM
BH CV ECHO MEAS - LA/AO: 1.1
BH CV ECHO MEAS - LAT PEAK E' VEL: 9 CM/SEC
BH CV ECHO MEAS - LV DIASTOLIC VOL/BSA (35-75): 56.1 ML/M^2
BH CV ECHO MEAS - LV MASS(C)D: 137.2 GRAMS
BH CV ECHO MEAS - LV MASS(C)DI: 61.6 GRAMS/M^2
BH CV ECHO MEAS - LV MEAN PG: 2 MMHG
BH CV ECHO MEAS - LV SYSTOLIC VOL/BSA (12-30): 33.7 ML/M^2
BH CV ECHO MEAS - LV V1 MAX: 90 CM/SEC
BH CV ECHO MEAS - LV V1 MEAN: 62.2 CM/SEC
BH CV ECHO MEAS - LV V1 VTI: 19.2 CM
BH CV ECHO MEAS - LVIDD: 4.2 CM
BH CV ECHO MEAS - LVIDS: 3.1 CM
BH CV ECHO MEAS - LVLD AP2: 8 CM
BH CV ECHO MEAS - LVLD AP4: 8.1 CM
BH CV ECHO MEAS - LVLS AP2: 7.8 CM
BH CV ECHO MEAS - LVLS AP4: 7.3 CM
BH CV ECHO MEAS - LVOT AREA (M): 3.8 CM^2
BH CV ECHO MEAS - LVOT AREA: 3.8 CM^2
BH CV ECHO MEAS - LVOT DIAM: 2.2 CM
BH CV ECHO MEAS - LVPWD: 1.1 CM
BH CV ECHO MEAS - MED PEAK E' VEL: 8.7 CM/SEC
BH CV ECHO MEAS - MV A DUR: 0.17 SEC
BH CV ECHO MEAS - MV A MAX VEL: 70.6 CM/SEC
BH CV ECHO MEAS - MV DEC SLOPE: 258 CM/SEC^2
BH CV ECHO MEAS - MV DEC TIME: 0.18 SEC
BH CV ECHO MEAS - MV E MAX VEL: 56.3 CM/SEC
BH CV ECHO MEAS - MV E/A: 0.8
BH CV ECHO MEAS - MV MEAN PG: 1 MMHG
BH CV ECHO MEAS - MV P1/2T MAX VEL: 63.3 CM/SEC
BH CV ECHO MEAS - MV P1/2T: 71.9 MSEC
BH CV ECHO MEAS - MV V2 MEAN: 45.2 CM/SEC
BH CV ECHO MEAS - MV V2 VTI: 20.5 CM
BH CV ECHO MEAS - MVA P1/2T LCG: 3.5 CM^2
BH CV ECHO MEAS - MVA(P1/2T): 3.1 CM^2
BH CV ECHO MEAS - MVA(VTI): 3.6 CM^2
BH CV ECHO MEAS - PA ACC SLOPE: 836 CM/SEC^2
BH CV ECHO MEAS - PA ACC TIME: 0.1 SEC
BH CV ECHO MEAS - PA MAX PG: 3 MMHG
BH CV ECHO MEAS - PA PR(ACCEL): 33.1 MMHG
BH CV ECHO MEAS - PA V2 MAX: 87.3 CM/SEC
BH CV ECHO MEAS - PULM A REVS DUR: 0.14 SEC
BH CV ECHO MEAS - PULM A REVS VEL: 22.9 CM/SEC
BH CV ECHO MEAS - PULM DIAS VEL: 21.7 CM/SEC
BH CV ECHO MEAS - PULM S/D: 1.2
BH CV ECHO MEAS - PULM SYS VEL: 25.5 CM/SEC
BH CV ECHO MEAS - QP/QS: 0.56
BH CV ECHO MEAS - RAP SYSTOLE: 3 MMHG
BH CV ECHO MEAS - RV MEAN PG: 0 MMHG
BH CV ECHO MEAS - RV V1 MEAN: 31.5 CM/SEC
BH CV ECHO MEAS - RV V1 VTI: 9.8 CM
BH CV ECHO MEAS - RVOT AREA: 4.2 CM^2
BH CV ECHO MEAS - RVOT DIAM: 2.3 CM
BH CV ECHO MEAS - RVSP: 14.3 MMHG
BH CV ECHO MEAS - SI(AO): 86.8 ML/M^2
BH CV ECHO MEAS - SI(CUBED): 19.9 ML/M^2
BH CV ECHO MEAS - SI(LVOT): 32.8 ML/M^2
BH CV ECHO MEAS - SI(MOD-SP2): 19.3 ML/M^2
BH CV ECHO MEAS - SI(MOD-SP4): 22.4 ML/M^2
BH CV ECHO MEAS - SI(TEICH): 18.3 ML/M^2
BH CV ECHO MEAS - SV(AO): 193.4 ML
BH CV ECHO MEAS - SV(CUBED): 44.3 ML
BH CV ECHO MEAS - SV(LVOT): 73 ML
BH CV ECHO MEAS - SV(MOD-SP2): 43 ML
BH CV ECHO MEAS - SV(MOD-SP4): 50 ML
BH CV ECHO MEAS - SV(RVOT): 40.8 ML
BH CV ECHO MEAS - SV(TEICH): 40.7 ML
BH CV ECHO MEAS - TAPSE (>1.6): 1.8 CM2
BH CV ECHO MEAS - TR MAX VEL: 168 CM/SEC
BH CV ECHO MEASUREMENTS AVERAGE E/E' RATIO: 6.36
BH CV XLRA - RV BASE: 3.7 CM
BH CV XLRA - RV LENGTH: 7.4 CM
BH CV XLRA - RV MID: 2.7 CM
BH CV XLRA - TDI S': 11 CM/SEC
BILIRUB SERPL-MCNC: 0.3 MG/DL (ref 0.2–1.2)
BUN BLD-MCNC: 10 MG/DL (ref 6–20)
BUN/CREAT SERPL: 11.5 (ref 7–25)
CALCIUM SPEC-SCNC: 8.6 MG/DL (ref 8.6–10.5)
CHLORIDE SERPL-SCNC: 107 MMOL/L (ref 98–107)
CHOLEST SERPL-MCNC: 123 MG/DL (ref 0–200)
CO2 SERPL-SCNC: 23.6 MMOL/L (ref 22–29)
CREAT BLD-MCNC: 0.87 MG/DL (ref 0.76–1.27)
DEPRECATED RDW RBC AUTO: 44.7 FL (ref 37–54)
ERYTHROCYTE [DISTWIDTH] IN BLOOD BY AUTOMATED COUNT: 13.7 % (ref 12.3–15.4)
GFR SERPL CREATININE-BSD FRML MDRD: 90 ML/MIN/1.73
GLOBULIN UR ELPH-MCNC: 3 GM/DL
GLUCOSE BLD-MCNC: 112 MG/DL (ref 65–99)
GLUCOSE BLDC GLUCOMTR-MCNC: 101 MG/DL (ref 70–130)
GLUCOSE BLDC GLUCOMTR-MCNC: 236 MG/DL (ref 70–130)
HBA1C MFR BLD: 10.1 % (ref 4.8–5.6)
HCT VFR BLD AUTO: 44.3 % (ref 37.5–51)
HDLC SERPL-MCNC: 31 MG/DL (ref 40–60)
HGB BLD-MCNC: 14.9 G/DL (ref 13–17.7)
LDLC SERPL CALC-MCNC: 65 MG/DL (ref 0–100)
LDLC/HDLC SERPL: 2.11 {RATIO}
LEFT ATRIUM VOLUME INDEX: 29 ML/M2
MCH RBC QN AUTO: 30.3 PG (ref 26.6–33)
MCHC RBC AUTO-ENTMCNC: 33.6 G/DL (ref 31.5–35.7)
MCV RBC AUTO: 90.2 FL (ref 79–97)
PLATELET # BLD AUTO: 174 10*3/MM3 (ref 140–450)
PMV BLD AUTO: 9.4 FL (ref 6–12)
POTASSIUM BLD-SCNC: 3.8 MMOL/L (ref 3.5–5.2)
PROT SERPL-MCNC: 6.6 G/DL (ref 6–8.5)
RBC # BLD AUTO: 4.91 10*6/MM3 (ref 4.14–5.8)
SODIUM BLD-SCNC: 141 MMOL/L (ref 136–145)
TRIGL SERPL-MCNC: 133 MG/DL (ref 0–150)
VLDLC SERPL-MCNC: 26.6 MG/DL (ref 5–40)
WBC NRBC COR # BLD: 7.54 10*3/MM3 (ref 3.4–10.8)

## 2020-01-15 PROCEDURE — 93306 TTE W/DOPPLER COMPLETE: CPT

## 2020-01-15 PROCEDURE — G0378 HOSPITAL OBSERVATION PER HR: HCPCS

## 2020-01-15 PROCEDURE — 80061 LIPID PANEL: CPT | Performed by: INTERNAL MEDICINE

## 2020-01-15 PROCEDURE — 0 GADOBENATE DIMEGLUMINE 529 MG/ML SOLUTION: Performed by: HOSPITALIST

## 2020-01-15 PROCEDURE — 70549 MR ANGIOGRAPH NECK W/O&W/DYE: CPT

## 2020-01-15 PROCEDURE — 82962 GLUCOSE BLOOD TEST: CPT

## 2020-01-15 PROCEDURE — 80053 COMPREHEN METABOLIC PANEL: CPT | Performed by: INTERNAL MEDICINE

## 2020-01-15 PROCEDURE — 93306 TTE W/DOPPLER COMPLETE: CPT | Performed by: INTERNAL MEDICINE

## 2020-01-15 PROCEDURE — 96361 HYDRATE IV INFUSION ADD-ON: CPT

## 2020-01-15 PROCEDURE — 99203 OFFICE O/P NEW LOW 30 MIN: CPT | Performed by: PSYCHIATRY & NEUROLOGY

## 2020-01-15 PROCEDURE — 85027 COMPLETE CBC AUTOMATED: CPT | Performed by: INTERNAL MEDICINE

## 2020-01-15 PROCEDURE — A9577 INJ MULTIHANCE: HCPCS | Performed by: HOSPITALIST

## 2020-01-15 PROCEDURE — 70544 MR ANGIOGRAPHY HEAD W/O DYE: CPT

## 2020-01-15 PROCEDURE — 25010000002 PERFLUTREN (DEFINITY) 8.476 MG IN SODIUM CHLORIDE (PF) 0.9 % 10 ML INJECTION: Performed by: INTERNAL MEDICINE

## 2020-01-15 PROCEDURE — 70551 MRI BRAIN STEM W/O DYE: CPT

## 2020-01-15 PROCEDURE — 83036 HEMOGLOBIN GLYCOSYLATED A1C: CPT | Performed by: INTERNAL MEDICINE

## 2020-01-15 RX ORDER — ATORVASTATIN CALCIUM 20 MG/1
40 TABLET, FILM COATED ORAL NIGHTLY
Status: DISCONTINUED | OUTPATIENT
Start: 2020-01-15 | End: 2020-01-15 | Stop reason: HOSPADM

## 2020-01-15 RX ORDER — TOPIRAMATE 25 MG/1
25 TABLET ORAL EVERY 12 HOURS SCHEDULED
Status: DISCONTINUED | OUTPATIENT
Start: 2020-01-15 | End: 2020-01-15 | Stop reason: HOSPADM

## 2020-01-15 RX ORDER — LEVOTHYROXINE SODIUM 0.2 MG/1
200 TABLET ORAL DAILY
Qty: 90 TABLET | Refills: 1 | Status: SHIPPED | OUTPATIENT
Start: 2020-01-15 | End: 2021-01-14

## 2020-01-15 RX ORDER — TOPIRAMATE 25 MG/1
25 TABLET ORAL EVERY 12 HOURS SCHEDULED
Qty: 60 TABLET | Refills: 0 | Status: SHIPPED | OUTPATIENT
Start: 2020-01-15 | End: 2022-08-22 | Stop reason: ALTCHOICE

## 2020-01-15 RX ADMIN — MECLIZINE HYDROCHLORIDE 25 MG: 25 TABLET ORAL at 14:28

## 2020-01-15 RX ADMIN — SPIRONOLACTONE 25 MG: 25 TABLET, FILM COATED ORAL at 10:01

## 2020-01-15 RX ADMIN — ALLOPURINOL 300 MG: 300 TABLET ORAL at 10:01

## 2020-01-15 RX ADMIN — CARVEDILOL 12.5 MG: 12.5 TABLET, FILM COATED ORAL at 10:01

## 2020-01-15 RX ADMIN — LEVOTHYROXINE SODIUM 200 MCG: 100 TABLET ORAL at 06:53

## 2020-01-15 RX ADMIN — ASPIRIN 81 MG: 81 TABLET, COATED ORAL at 10:01

## 2020-01-15 RX ADMIN — GADOBENATE DIMEGLUMINE 20 ML: 529 INJECTION, SOLUTION INTRAVENOUS at 08:09

## 2020-01-15 RX ADMIN — FAMOTIDINE 20 MG: 20 TABLET, FILM COATED ORAL at 10:01

## 2020-01-15 RX ADMIN — TAMSULOSIN HYDROCHLORIDE 0.4 MG: 0.4 CAPSULE ORAL at 10:01

## 2020-01-15 RX ADMIN — PERFLUTREN 2 ML: 6.52 INJECTION, SUSPENSION INTRAVENOUS at 10:15

## 2020-01-15 RX ADMIN — TOPIRAMATE 25 MG: 25 TABLET, FILM COATED ORAL at 14:28

## 2020-01-15 RX ADMIN — MECLIZINE HYDROCHLORIDE 25 MG: 25 TABLET ORAL at 06:53

## 2020-01-15 RX ADMIN — LISINOPRIL 10 MG: 10 TABLET ORAL at 10:01

## 2020-01-15 NOTE — SIGNIFICANT NOTE
01/15/20 1426   Rehab Time/Intention   Evaluation Not Performed other (see comments)  (Pt denies need for OT. Pt states feels baseline function. d/c OT)   Rehab Treatment   Discipline occupational therapist

## 2020-01-15 NOTE — H&P
Internal medicine history and physical  INTERNAL MEDICINE   [facility]       Patient Identification:  Name: Narciso Mejía  Age: 59 y.o.  Sex: male  :  1960  MRN: [id number]                   Primary Care Physician: Marco Nicholson MD                                   Chief Complaint: Unsteadiness nausea vomiting and dizziness worse with head movement ongoing since October really bad since last .    History of Present Illness:   Patient is a 59-year-old male with past medical history as noted below consisting of hypertension hypothyroidism diabetes mellitus as well as history of Crohn's disease and panhypopituitarism that was diagnosed in  and has been on hormone replacement therapy was in his usual state of his health until about October of last year when he started noticing episodes in which he gets significantly nauseous and unsteady and dizzy especially when he moves his head in certain ways.  Initially these episodes were not very frequent and was able to work things around but has seen his physician and various specialists without any answer to his condition.  He denies any significant weight loss weight gain decrease in appetite fever or chills.  Daily he was also having some blurry vision which has been issue for about a year but really worse in the last couple of months.  He is also noticing that things in front of him are swelling and shrinking without any warning.  He denies any focal weakness of arm or legs except that he gets some numbness and tingling to his 2 and half fingers on each hand on the ulnar side that gets better with changes in the posture of his arm and elbows.  Because patient was getting more nauseous and had episode of vomiting that did not stop for quite some time on  with unsteadiness not getting any better he decided to come to the emergency room for further evaluation.  He admits to ringing in the ear and occasional headaches.  He denies any  specific loss of continence but does state that he has been dribbling lately.      Past Medical History:  Past Medical History:   Diagnosis Date   • Arthritis    • Bronchitis    • Crohn's colitis (CMS/HCC)    • Diabetes mellitus (CMS/HCC)    • Disease of thyroid gland    • GERD (gastroesophageal reflux disease)    • Gout    • Hiatal hernia    • High cholesterol    • Hypertension    • Hypothyroidism    • Liver disease    • Myocardial infarction (CMS/HCC)    • Peptic ulceration    • 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CMS/HCC)    • Pneumonia    • Testosterone deficiency    • Testosterone deficiency in male    • Vitamin D deficiency      Past Surgical History:  Past Surgical History:   Procedure Laterality Date   • APPENDECTOMY     • COLONOSCOPY     • CORONARY ANGIOPLASTY WITH STENT PLACEMENT     • ESOPHAGUS SURGERY     • HERNIA REPAIR     • POLYPECTOMY     • TONSILECTOMY, ADENOIDECTOMY, BILATERAL MYRINGOTOMY AND TUBES        Home Meds:    (Not in a hospital admission)  Current Meds:     Current Facility-Administered Medications:   •  sodium chloride 0.9 % flush 10 mL, 10 mL, Intravenous, PRN, Julio Cesar Arteaga MD, 10 mL at 01/14/20 1332  •  sodium chloride 0.9 % flush 10 mL, 10 mL, Intravenous, PRN, Julio Cesar Arteaga MD    Current Outpatient Medications:   •  allopurinol (ZYLOPRIM) 300 MG tablet, Take 1 tablet by mouth Daily., Disp: 90 tablet, Rfl: 1  •  anastrozole (ARIMIDEX) 1 MG tablet, Take 1 mg by mouth Daily., Disp: , Rfl:   •  aspirin (ECOTRIN LOW STRENGTH) 81 MG EC tablet, Take 1 tablet by mouth Daily., Disp: , Rfl:   •  atorvastatin (LIPITOR) 40 MG tablet, Take 1 tablet by mouth Daily., Disp: 90 tablet, Rfl: 1  •  carvedilol (COREG) 12.5 MG tablet, Take 1 tablet by mouth 2 (Two) Times a Day., Disp: 180 tablet, Rfl: 1  •  Dulaglutide (TRULICITY) 1.5 MG/0.5ML solution pen-injector, Inject  under the skin into the appropriate area as directed., Disp: , Rfl:   •  Empagliflozin (JARDIANCE PO), Take  by mouth., Disp: , Rfl:    •  Insulin Lispro (HUMALOG KWIKPEN) 100 UNIT/ML solution pen-injector, 15 units prior to each meal, Disp: 45 mL, Rfl: 1  •  levothyroxine (SYNTHROID) 200 MCG tablet, Take 1 tablet by mouth Daily., Disp: 90 tablet, Rfl: 1  •  linaclotide (LINZESS) 145 MCG capsule capsule, Take 1 capsule by mouth Every Morning Before Breakfast., Disp: 90 capsule, Rfl: 1  •  lisinopril (PRINIVIL,ZESTRIL) 10 MG tablet, Take 1 tablet by mouth Daily., Disp: 90 tablet, Rfl: 1  •  meloxicam (MOBIC) 7.5 MG tablet, Take 7.5 mg by mouth Daily., Disp: , Rfl:   •  metFORMIN (GLUCOPHAGE) 1000 MG tablet, Take 1,000 mg by mouth 2 (Two) Times a Day With Meals., Disp: , Rfl:   •  ONETOUCH VERIO test strip, Use to test BG 3 times daily. DX Code: E11.9, Disp: 300 each, Rfl: 1  •  ranitidine (ZANTAC) 150 MG capsule, Take 1 capsule by mouth Daily., Disp: , Rfl:   •  spironolactone (ALDACTONE) 25 MG tablet, Take 1 tablet by mouth Daily., Disp: 90 tablet, Rfl: 1  •  tamsulosin (FLOMAX) 0.4 MG capsule 24 hr capsule, Take 0.4 mg by mouth Daily., Disp: , Rfl:   •  Testosterone Cypionate (DEPOTESTOTERONE CYPIONATE) 200 MG/ML injection, 0.6 mg weekly, Disp: 4 mL, Rfl: 0  •  exenatide er (BYDUREON BCISE) 2 MG/0.85ML auto-injector injection, Inject 0.85 mL under the skin into the appropriate area as directed 1 (One) Time Per Week., Disp: 4 pen, Rfl: 1  •  TRESIBA FLEXTOUCH 200 UNIT/ML solution pen-injector, Inject 80 Units under the skin into the appropriate area as directed Daily., Disp: 9 pen, Rfl: 1  •  XIGDUO XR 5-1000 MG tablet, Take 2 tablets by mouth Daily., Disp: 180 tablet, Rfl: 1  Allergies:  Allergies   Allergen Reactions   • Bacitracin-Neomycin-Polymyxin Hives     Hives and blisters   • Bydureon [Exenatide] Hives   • Penicillins Swelling     Social History:   Social History     Tobacco Use   • Smoking status: Former Smoker     Packs/day: 2.00     Years: 24.00     Pack years: 48.00     Types: Cigarettes   • Smokeless tobacco: Never Used   • Tobacco  "comment: 19 yeats ago   Substance Use Topics   • Alcohol use: No      Family History:  History reviewed. No pertinent family history.       Review of Systems  See history of present illness and past medical history.   Constitutional: Negative for chills and fever.   HENT: Positive for ear pain (ringing).    Eyes: Positive for visual disturbance.   Respiratory: Negative for shortness of breath.    Cardiovascular: Positive for palpitations. Negative for chest pain.   Gastrointestinal: Positive for abdominal pain, nausea and vomiting.   Genitourinary: Negative for dysuria and hematuria.   Musculoskeletal: Negative.    Skin: Negative for rash.   Neurological: Positive for dizziness and headaches. Negative for syncope and weakness.   Psychiatric/Behavioral: Negative.  Negative for confusion.     Vitals:   /91   Pulse 62   Temp 97 °F (36.1 °C) (Tympanic)   Resp 16   Ht 188 cm (74\")   Wt 96.2 kg (212 lb)   SpO2 100%   BMI 27.22 kg/m²   I/O:     Intake/Output Summary (Last 24 hours) at 1/14/2020 1907  Last data filed at 1/14/2020 1600  Gross per 24 hour   Intake 1000 ml   Output --   Net 1000 ml     Exam:  General Appearance:    Alert, cooperative, no distress, appears stated age, well-groomed and does not appear to be in any acute distress.   Head:    Normocephalic, without obvious abnormality, atraumatic   Eyes:    PERRL, conjunctiva/corneas clear, EOM's intact, both eyes   Ears:    Normal external ear canals, both ears   Nose:   Nares normal, septum midline, mucosa normal, no drainage    or sinus tenderness   Throat:   Lips, tongue, gums normal; oral mucosa pink and moist   Neck:   Supple, symmetrical, trachea midline, no adenopathy;     thyroid:  no enlargement/tenderness/nodules; no carotid    bruit or JVD   Back:     Symmetric, no curvature, ROM normal, no CVA tenderness   Lungs:     Clear to auscultation bilaterally, respirations unlabored   Chest Wall:    No tenderness or deformity    Heart:    Regular " rate and rhythm, S1 and S2 normal, no murmur, rub   or gallop   Abdomen:     Soft, non-tender, bowel sounds active all four quadrants,     no masses, no hepatomegaly, no splenomegaly   Extremities:   Extremities normal, atraumatic, no cyanosis or edema   Pulses:   Pulses palpable in all extremities; symmetric all extremities   Skin:   Skin color normal, Skin is warm and dry,  no rashes or palpable lesions   Neurologic:   CNII-XII intact, slow component of the nystagmus at extreme gazes to the left, motor strength grossly intact, sensation grossly intact to light touch, no focal deficits noted       Data Review:      I reviewed the patient's new clinical results.  Results from last 7 days   Lab Units 01/14/20  1331   WBC 10*3/mm3 8.61   HEMOGLOBIN g/dL 15.1   PLATELETS 10*3/mm3 209     Results from last 7 days   Lab Units 01/14/20  1331   SODIUM mmol/L 140   POTASSIUM mmol/L 4.3   CHLORIDE mmol/L 104   CO2 mmol/L 24.3   BUN mg/dL 12   CREATININE mg/dL 1.13   CALCIUM mg/dL 9.7   GLUCOSE mg/dL 219*     Xr Chest 2 View    Result Date: 1/14/2020  No focal pulmonary consolidation. Indeterminate nodular density at the left base, follow-up evaluation recommended.  This report was finalized on 1/14/2020 3:28 PM by Dr. Amarjit Lemus M.D.      ECG 12 Lead   Final Result   HEART RATE= 58  bpm   RR Interval= 1028  ms   VA Interval= 164  ms   P Horizontal Axis= -29  deg   P Front Axis= -8  deg   QRSD Interval= 90  ms   QT Interval= 422  ms   QRS Axis= 19  deg   T Wave Axis= 55  deg   - ABNORMAL ECG -   Sinus rhythm   Anterior infarct, old   NO PRIOR TRACING AVAILABLE FOR COMPARISON   Electronically Signed By: Michael ElkinsJUHI) ([facility]) 14-Jan-2020 13:53:09   Date and Time of Study: 2020-01-14 13:10:49            Assessment:  Active Hospital Problems    Diagnosis POA   • **Dizziness [R42] Yes   • Ataxia [R27.0] Unknown   • Benign essential HTN [I10] Yes   • Diabetes mellitus (CMS/HCC) [E11.9] Yes   • Hypothyroidism [E03.9]  Yes   • Panhypopituitarism (CMS/HCC) [E23.0] Yes   • Memory loss [R41.3] Yes       Medical decision making:  Progressive ataxia with vertigo and tinnitus and nausea and vomiting with symptoms getting worse with movement of his head and posture in the context of risk factors such as diabetes hypertension dyslipidemia-this presentation is concerning for posterior circulation CVA/TIA.  Rule out benign positional vertigo or labyrinthitis or vestibulitis.  Plan is to admit the patient provide him with symptomatic relief with Antivert neurology consultation neurochecks and MRI MRA of head and neck vessels.  Continue aspirin statins and control his blood pressure with avoidance of hypotension.  Panhypopituitarism-continue his hormone replacement therapy.  Hypothyroidism-continue thyroid replacement therapy  Diabetes mellitus-continue with Accu-Chek sliding scale coverage monitor for hypoglycemia and check hemoglobin A1c.  Hold his oral hypoglycemic agents for the time being.  Hypertension-continue antihypertensive regimen and avoid hypotensive episodes.    Shruthi Garcia MD   1/14/2020  7:07 PM  Much of this encounter note is an electronic transcription/translation of spoken language to printed text. The electronic translation of spoken language may permit erroneous, or at times, nonsensical words or phrases to be inadvertently transcribed; Although I have reviewed the note for such errors, some may still exist

## 2020-01-15 NOTE — PLAN OF CARE
Problem: Patient Care Overview  Goal: Plan of Care Review  Outcome: Ongoing (interventions implemented as appropriate)  Flowsheets (Taken 1/15/2020 0638)  Progress: improving  Plan of Care Reviewed With: patient  Outcome Summary: VSS, am labs, MRI today, NIH-0, neuro to see, no dizziness reported, echo, r/o CVA

## 2020-01-15 NOTE — DISCHARGE SUMMARY
LOS: 0 days     Name: Narciso Mejía  Age/Sex: 59 y.o. male  :  1960        PCP: Marco Nicholson MD  Chief Complaint   Patient presents with   • Abdominal Pain     upper   • Abnormal Lab   • Balance Issues      Subjective   Still with some dizziness but otherwise feeling better denies any other new issues or complaints today.  General: No Fever or Chills, Cardiac: No Chest Pain or Palpitations, Resp: No Cough or SOA, GI: No Nausea, Vomiting, or Diarrhea and Other: No bleeding      allopurinol 300 mg Oral Daily   aspirin 81 mg Oral Daily   atorvastatin 40 mg Oral Nightly   carvedilol 12.5 mg Oral BID   famotidine 20 mg Oral Daily   insulin lispro 0-9 Units Subcutaneous 4x Daily With Meals & Nightly   levothyroxine 200 mcg Oral Q AM   linaclotide 145 mcg Oral QAM AC   lisinopril 10 mg Oral Daily   meclizine 25 mg Oral Q8H   spironolactone 25 mg Oral Daily   tamsulosin 0.4 mg Oral Daily   topiramate 25 mg Oral Q12H       sodium chloride 75 mL/hr Last Rate: Stopped (01/15/20 1003)       Objective   Vital Signs  Temp:  [97.1 °F (36.2 °C)-98 °F (36.7 °C)] 98 °F (36.7 °C)  Heart Rate:  [62-75] 74  Resp:  [16] 16  BP: (103-139)/(65-97) 110/71  Body mass index is 27.22 kg/m².    Intake/Output Summary (Last 24 hours) at 1/15/2020 1356  Last data filed at 2020 2300  Gross per 24 hour   Intake 1200 ml   Output --   Net 1200 ml       Physical Exam   Constitutional: He is oriented to person, place, and time. He appears well-developed and well-nourished.   HENT:   Head: Normocephalic and atraumatic.   Cardiovascular: Normal rate, regular rhythm and normal heart sounds.   Pulmonary/Chest: Effort normal and breath sounds normal.   Abdominal: Soft. Bowel sounds are normal.   Neurological: He is alert and oriented to person, place, and time.   Nursing note and vitals reviewed.        Results Review:       I reviewed the patient's new clinical results.  Results from last 7 days   Lab Units 01/15/20  0451 20  1331    WBC 10*3/mm3 7.54 8.61   HEMOGLOBIN g/dL 14.9 15.1   PLATELETS 10*3/mm3 174 209     Results from last 7 days   Lab Units 01/15/20  0451 01/14/20  1331   SODIUM mmol/L 141 140   POTASSIUM mmol/L 3.8 4.3   CHLORIDE mmol/L 107 104   CO2 mmol/L 23.6 24.3   BUN mg/dL 10 12   CREATININE mg/dL 0.87 1.13   CALCIUM mg/dL 8.6 9.7   Estimated Creatinine Clearance: 124.4 mL/min (by C-G formula based on SCr of 0.87 mg/dL).      Assessment/Plan     Dizziness    Diabetes mellitus (CMS/HCC)    Hypothyroidism    Memory loss    Panhypopituitarism (CMS/HCC)    Benign essential HTN    Ataxia      PLAN  - closes follow up with Marco Nicholson MD for diabetes management with A1c of 10  - out patient vestibular rehab MRI negative  - home today if 2D echo is ok      Disposition  Dc today      Blaze Montgomery MD  [facility]  01/15/20  1:56 PM

## 2020-01-15 NOTE — ED NOTES
"Nursing report ED to floor  Narciso Mejía  59 y.o.  male    HPI (triage note):   Chief Complaint   Patient presents with   • Abdominal Pain     upper   • Abnormal Lab   • Balance Issues       Admitting doctor:   Shruthi Garcia MD    Admitting diagnosis:   The primary encounter diagnosis was Dizziness. A diagnosis of Ataxia was also pertinent to this visit.    Code status:   Current Code Status     Date Active Code Status Order ID Comments User Context       1/14/2020 1913 CPR [id number]  Shruthi Garcia MD ED       Questions for Current Code Status     Question Answer Comment    Code Status CPR     Medical Interventions (Level of Support Prior to Arrest) Full           Allergies:   Bacitracin-neomycin-polymyxin; Bydureon [exenatide]; and Penicillins    Weight:       01/14/20  1305   Weight: 96.2 kg (212 lb)       Most recent vitals:   Vitals:    01/14/20 1305 01/14/20 1316 01/14/20 1320 01/14/20 1650   BP:  127/94 131/81 133/91   Pulse: 64  62 62   Resp: 16      Temp: 97 °F (36.1 °C)      TempSrc: Tympanic      SpO2: 99%  99% 100%   Weight: 96.2 kg (212 lb)      Height: 188 cm (74\")          Active LDAs/IV Access:   Lines, Drains & Airways    Active LDAs     Name:   Placement date:   Placement time:   Site:   Days:    Peripheral IV 01/14/20 1325 Right Antecubital   01/14/20    1325    Antecubital   less than 1                Labs (abnormal labs have a star):   Labs Reviewed   URINALYSIS W/ MICROSCOPIC IF INDICATED (NO CULTURE) - Abnormal; Notable for the following components:       Result Value    Glucose, UA >=1000 mg/dL (3+) (*)     All other components within normal limits    Narrative:     Urine microscopic not indicated.   COMPREHENSIVE METABOLIC PANEL - Abnormal; Notable for the following components:    Glucose 219 (*)     All other components within normal limits    Narrative:     GFR Normal >60  Chronic Kidney Disease <60  Kidney Failure <15     CBC WITH AUTO DIFFERENTIAL - Abnormal; Notable for the following " components:    Eosinophil % 11.8 (*)     Eosinophils, Absolute 1.02 (*)     All other components within normal limits   LIPASE - Normal   TROPONIN (IN-HOUSE) - Normal    Narrative:     Troponin T Reference Range:  <= 0.03 ng/mL-   Negative for AMI  >0.03 ng/mL-     Abnormal for myocardial necrosis.  Clinicians would have to utilize clinical acumen, EKG, Troponin and serial changes to determine if it is an Acute Myocardial Infarction or myocardial injury due to an underlying chronic condition.       Results may be falsely decreased if patient taking Biotin.     RAINBOW DRAW    Narrative:     The following orders were created for panel order Thomas Draw.  Procedure                               Abnormality         Status                     ---------                               -----------         ------                     Light Blue Top[[id number]]                                   Final result               Green Top (Gel)[[id number]]                                  Final result               Lavender Top[[id number]]                                     Final result               Gold Top - SST[[id number]]                                   Final result                 Please view results for these tests on the individual orders.   LIGHT BLUE TOP   GREEN TOP   LAVENDER TOP   GOLD TOP - SST   CBC AND DIFFERENTIAL    Narrative:     The following orders were created for panel order CBC & Differential.  Procedure                               Abnormality         Status                     ---------                               -----------         ------                     CBC Auto Differential[[id number]]        Abnormal            Final result                 Please view results for these tests on the individual orders.       EKG:   ECG 12 Lead   Final Result   HEART RATE= 58  bpm   RR Interval= 1028  ms   OR Interval= 164  ms   P Horizontal Axis= -29  deg   P Front Axis= -8  deg   QRSD Interval= 90  ms   QT Interval=  422  ms   QRS Axis= 19  deg   T Wave Axis= 55  deg   - ABNORMAL ECG -   Sinus rhythm   Anterior infarct, old   NO PRIOR TRACING AVAILABLE FOR COMPARISON   Electronically Signed By: Michael ElkinsJUHI) ([facility]) 14-Jan-2020 13:53:09   Date and Time of Study: 2020-01-14 13:10:49          Meds given in ED:   Medications   sodium chloride 0.9 % flush 10 mL (10 mL Intravenous Given 1/14/20 1332)   sodium chloride 0.9 % flush 10 mL (has no administration in time range)   ondansetron (ZOFRAN) injection 4 mg (4 mg Intravenous Given 1/14/20 1508)   sodium chloride 0.9 % bolus 1,000 mL (0 mL Intravenous Stopped 1/14/20 1600)       Imaging results:  Xr Chest 2 View    Result Date: 1/14/2020  No focal pulmonary consolidation. Indeterminate nodular density at the left base, follow-up evaluation recommended.  This report was finalized on 1/14/2020 3:28 PM by Dr. Amarjit Lemus M.D.        Ambulatory status:   - Up with assistance    Social issues:   Social History     Socioeconomic History   • Marital status:      Spouse name: Not on file   • Number of children: Not on file   • Years of education: Not on file   • Highest education level: Not on file   Tobacco Use   • Smoking status: Former Smoker     Packs/day: 2.00     Years: 24.00     Pack years: 48.00     Types: Cigarettes   • Smokeless tobacco: Never Used   • Tobacco comment: 19 yeats ago   Substance and Sexual Activity   • Alcohol use: No   • Drug use: No   • Sexual activity: Aly Seo, RN  01/14/20 1939

## 2020-01-15 NOTE — SIGNIFICANT NOTE
01/15/20 1507   Rehab Time/Intention   Evaluation Not Performed other (see comments)  (Pt passed swallow screen and is tolerating a regular diet. MRI negative for acute CVA. ST is not indicated at this time. ST to sign off. Please reconsult if needed. Thank you)   Rehab Treatment   Discipline speech language pathologist

## 2020-01-15 NOTE — CONSULTS
"Diabetes Education  Assessment/Teaching    Patient Name:  Narciso Mejía  YOB: 1960  MRN: [id number]  Admit Date:  1/14/2020      Assessment Date:  1/15/2020    Most Recent Value   General Information    Referral From:  A1c   Height  188 cm (74\")   Height Method  Stated   Weight  96.2 kg (212 lb)   Weight Method  Stated   Pregnancy Assessment   Diabetes History   What type of diabetes do you have?  Type 2   Current DM knowledge  good   Do you test your blood sugar at home?  yes   Frequency of checks  BID   Who performs the test?  self   Typical readings  55-200s ac breakfast and 125-250s ac dinner   Have you had low blood sugar? (<70mg/dl)  yes   How often do you have low blood sugar?  frequently [2-3 times a week in AM]   Education Preferences   Barriers to Learning  other (comment) [None noted]   Nutrition Information   Assessment Topics   Reducing Risk - Assessment  Needs education   DM Goals   Reducing Risk - Goal  30-90 days from discharge   Contact Plan  Follow-up medical care            Most Recent Value   DM Education Needs   Meter  Has own [Discussed CGM benefits, especially for hypoglycemia. ]   Frequency of Testing  4 times a day [Encouraged more frequent testing for follow up visit with Dr. Bearden to help pt adjust insulin dosages.]   Medication  Insulin, Actions, Side effects [Discussed Humulin 70/30 risks of hypoglycemia and variability.  Pt states is using Humulin 70/30 due to cost factors.  Pt self adjusts dosages.  Discussed follow up with Dr. Bearden to help pt fine tune insulin adjustment and decrease A1c. ]   Reducing Risks  A1C testing, Cardiovascular [A1c 10.1%.  Reviewed CVA/MI risk factors.  ]   Healthy Coping  Appropriate   Discharge Plan  Home   Motivation  Engaged   Teaching Method  Explanation, Discussion, Handouts, Demonstration   Patient Response  Verbalized understanding              Electronically signed by:  Unique Lowe RN  01/15/20 3:20 PM  "

## 2020-01-15 NOTE — PROGRESS NOTES
Continued Stay Note  [facility]     Patient Name: Narciso Mejía  MRN: [id number]  Today's Date: 1/15/2020    Admit Date: 1/14/2020    Discharge Plan     Row Name 01/15/20 1559       Plan    Plan Comments  Pt to be dc'd today.  He will need Vestibular rehab.  Discussed with pt where he would like to go, [facility], pt about 1/2 way in between.  He picked [facility].  [facility] was set up with  [facility]. Family will transport home.   Sola Hagan RN    Row Name 01/15/20 1554       Plan    Plan  Home        Discharge Codes    No documentation.       Expected Discharge Date and Time     Expected Discharge Date Expected Discharge Time    To 15, 2020             Sola Hagan RN

## 2020-01-15 NOTE — CONSULTS
Neurology Note    Patient:  Narciso Mejía    YOB: 1960    REFERRING PHYSICIAN:  Shruthi Garcia MD    CHIEF COMPLAINT:    dizziness    HISTORY OF PRESENT ILLNESS:   The patient is a 59 y.o. male with DM, HTN, HLP, daily heavy smoker has developed recurrent episodes of dizziness/vertigo, blurred/kaleidoscope vision changes followed by throbbing headaches. He has had several spells while driving, felt that something was wrong with his truck. No focal numbness or weakness. Currently back to baseline. /94 in ED, NSR, . Got IVF and Zofran in ED.    Past Medical History:  Past Medical History:   Diagnosis Date   • Arthritis    • Bronchitis    • Crohn's colitis (CMS/HCC)    • Diabetes mellitus (CMS/HCC)    • Disease of thyroid gland    • GERD (gastroesophageal reflux disease)    • Gout    • Hiatal hernia    • High cholesterol    • Hypertension    • Hypothyroidism    • Liver disease    • Myocardial infarction (CMS/HCC)    • Peptic ulceration    • 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CMS/HCC)    • Pneumonia    • Testosterone deficiency    • Testosterone deficiency in male    • Vitamin D deficiency        Past Surgical History:  Past Surgical History:   Procedure Laterality Date   • APPENDECTOMY     • COLONOSCOPY     • CORONARY ANGIOPLASTY WITH STENT PLACEMENT     • ESOPHAGUS SURGERY     • HERNIA REPAIR     • POLYPECTOMY     • TONSILECTOMY, ADENOIDECTOMY, BILATERAL MYRINGOTOMY AND TUBES         Social History:   Social History     Socioeconomic History   • Marital status:      Spouse name: Not on file   • Number of children: Not on file   • Years of education: Not on file   • Highest education level: Not on file   Tobacco Use   • Smoking status: Former Smoker     Packs/day: 2.00     Years: 24.00     Pack years: 48.00     Types: Cigarettes   • Smokeless tobacco: Never Used   • Tobacco comment: 20 years ago   Substance and Sexual Activity   • Alcohol use: No   • Drug use: No   • Sexual activity: Defer         Family History:   History reviewed. No pertinent family history.    Medications Prior to Admission:    Prior to Admission medications    Medication Sig Start Date End Date Taking? Authorizing Provider   allopurinol (ZYLOPRIM) 300 MG tablet Take 1 tablet by mouth Daily. 1/14/19  Yes Susu Heck APRN   aspirin (ECOTRIN LOW STRENGTH) 81 MG EC tablet Take 1 tablet by mouth Daily. 6/23/15  Yes Adriana Lee MD   atorvastatin (LIPITOR) 40 MG tablet Take 1 tablet by mouth Daily. 1/14/19  Yes Susu Heck APRN   carvedilol (COREG) 12.5 MG tablet Take 1 tablet by mouth 2 (Two) Times a Day. 1/14/19  Yes Susu Heck APRN   Dulaglutide (TRULICITY) 1.5 MG/0.5ML solution pen-injector Inject  under the skin into the appropriate area as directed.   Yes Adriana Lee MD   Empagliflozin (JARDIANCE PO) Take  by mouth.   Yes Adriana Lee MD   Insulin Lispro (HUMALOG KWIKPEN) 100 UNIT/ML solution pen-injector 15 units prior to each meal 1/14/19  Yes Susu Heck APRN   levothyroxine (SYNTHROID) 200 MCG tablet Take 1 tablet by mouth Daily. 1/14/19 1/14/20 Yes Susu Heck APRN   linaclotide (LINZESS) 145 MCG capsule capsule Take 1 capsule by mouth Every Morning Before Breakfast. 1/14/19  Yes Susu Heck APRN   lisinopril (PRINIVIL,ZESTRIL) 10 MG tablet Take 1 tablet by mouth Daily. 1/14/19  Yes Susu Heck APRN   meloxicam (MOBIC) 7.5 MG tablet Take 7.5 mg by mouth Daily. 6/23/15  Yes Adriana Lee MD   metFORMIN (GLUCOPHAGE) 1000 MG tablet Take 1,000 mg by mouth 2 (Two) Times a Day With Meals.   Yes Adriana Lee MD   ONETOUCH VERIO test strip Use to test BG 3 times daily. DX Code: E11.9 2/18/19  Yes Susu Heck APRN   ranitidine (ZANTAC) 150 MG capsule Take 1 capsule by mouth Daily. 6/23/15  Yes Adriana Lee MD   spironolactone (ALDACTONE) 25 MG tablet Take 1 tablet by mouth Daily. 1/14/19  Yes Susu Heck APRN   tamsulosin (FLOMAX) 0.4 MG capsule 24  hr capsule Take 0.4 mg by mouth Daily. 6/23/15  Yes Provider, MD Adriana   Testosterone Cypionate (DEPOTESTOTERONE CYPIONATE) 200 MG/ML injection 0.6 mg weekly 1/14/19  Yes Susu Heck APRN   exenatide er (BYDUREON BCISE) 2 MG/0.85ML auto-injector injection Inject 0.85 mL under the skin into the appropriate area as directed 1 (One) Time Per Week. 4/16/19   Susu Heck APRN   TRESIBA FLEXTOUCH 200 UNIT/ML solution pen-injector Inject 80 Units under the skin into the appropriate area as directed Daily. 1/14/19   Susu Heck APRN   XIGDUO XR 5-1000 MG tablet Take 2 tablets by mouth Daily. 1/14/19   Susu Heck APRN       Allergies:  Bacitracin-neomycin-polymyxin; Bydureon [exenatide]; and Penicillins      Review of system  Review of Systems   Eyes: Positive for visual disturbance.   Gastrointestinal: Positive for nausea.   Neurological: Positive for dizziness and headaches.   All other systems reviewed and are negative.      Vitals:    01/15/20 0921   BP: 103/65   Pulse: 65   Resp:    Temp:    SpO2: 97%       Physical exam  Physical Exam   Constitutional: He is oriented to person, place, and time. He appears well-developed and well-nourished.   HENT:   Head: Normocephalic and atraumatic.   Eyes: Pupils are equal, round, and reactive to light. EOM are normal.   Cardiovascular: Normal rate and regular rhythm.   Pulmonary/Chest: Effort normal.   Neurological: He is alert and oriented to person, place, and time. He has normal strength. He displays normal reflexes. No cranial nerve deficit or sensory deficit. He exhibits normal muscle tone. Coordination normal.   DTRs hypoactive.   Psychiatric: He has a normal mood and affect. His behavior is normal. Thought content normal.         Lab Results   Component Value Date    WBC 7.54 01/15/2020    HGB 14.9 01/15/2020    HCT 44.3 01/15/2020    MCV 90.2 01/15/2020     01/15/2020     Lab Results   Component Value Date    GLUCOSE 112 (H) 01/15/2020    BUN  10 01/15/2020    CREATININE 0.87 01/15/2020    EGFRIFNONA 90 01/15/2020    EGFRIFAFRI 94 01/14/2019    BCR 11.5 01/15/2020    CO2 23.6 01/15/2020    CALCIUM 8.6 01/15/2020    PROTENTOTREF 7.2 01/14/2019    ALBUMIN 3.60 01/15/2020    LABIL2 1.4 01/14/2019    AST 16 01/15/2020    ALT 32 01/15/2020     Hemoglobin A1C  4.80 - 5.60 % 10.10High    Panel   Order: [id number]   Status:  Final result   Visible to patient:  No (Not Released) Next appt:  None   Component  Ref Range & Units 04:51  (1/15/20) 1yr ago  (1/14/19) 1yr ago  (10/9/18) 4yr ago  (6/23/15)   Total Cholesterol  0 - 200 mg/dL 123  146  167  97 CM   Triglycerides  0 - 150 mg/dL 133  167High   181High   98 CM   HDL Cholesterol  40 - 60 mg/dL 31Low   30Low   39Low   29Low  CM   LDL Cholesterol   0 - 100 mg/dL 65  83  92  49 CM   VLDL Cholesterol  5 - 40 mg/dL 26.6  33.4  36.2  20 R   LDL/HDL Ratio 2.11                   ECG 12 Lead   Order: [id number]   Status:  Final result   Visible to patient:  No (Not Released) Next appt:  None      Narrative & Impression     HEART RATE= 58  bpm  RR Interval= 1028  ms  NJ Interval= 164  ms  P Horizontal Axis= -29  deg  P Front Axis= -8  deg  QRSD Interval= 90  ms  QT Interval= 422  ms  QRS Axis= 19  deg  T Wave Axis= 55  deg  - ABNORMAL ECG -  Sinus rhythm  Anterior infarct, old  NO PRIOR TRACING AVAILABLE FOR COMPARISON  Electronically Signed By: Michael Elkins) ([facility]) 14-Jan-2020 13:53:09  Date and Time of Study: 2020-01-14 13:10:49      Specimen Collected: 01/14/20 13:10 Last Resulted: 01/14/20 13:53                  Radiological Studies:    Xr Chest 2 View    Result Date: 1/14/2020  XR CHEST 2 VW-  HISTORY: Male who is 59 years-old, upper abdominal pain  TECHNIQUE: Frontal and lateral views of the chest  COMPARISON: None available  FINDINGS: Heart, mediastinum and pulmonary vasculature are unremarkable. No focal pulmonary consolidation, pleural effusion, or pneumothorax. Old granulomatous disease is apparent in the  right lung. Indeterminate nodular density peripherally at the left base, follow-up evaluation recommended (for example chest CT). Moderate hiatal hernia is apparent. No acute osseous process.      No focal pulmonary consolidation. Indeterminate nodular density at the left base, follow-up evaluation recommended.  This report was finalized on 1/14/2020 3:28 PM by Dr. Amarjit Lemus M.D.      Mri Angiogram Head Without Contrast    Result Date: 1/15/2020  MRI BRAIN WITHOUT CONTRAST, MRA HEAD WITHOUT CONTRAST, MRA NECK WITH AND WITHOUT CONTRAST  HISTORY: Ataxia.  COMPARISON: None.  TECHNIQUE: MRI was performed of the brain without intravenous administration of gadolinium contrast. 3-D time-of-flight MR angiography was performed of the head. 3-D time-of-flight as well as postcontrast MR angiography was performed of the neck with axial images. Multiple 3-D MIP images were provided.  FINDINGS:  MRI brain:  There is no restricted diffusion. White matter demonstrates normal signal characteristics. There is no extra-axial collection. There is no finding of parenchymal hemorrhage. There is no hydrocephalus. Midline structures are unremarkable.  MR angiography neck:  The origins of the great vessels are widely patent.  The common and internal carotid arteries are without appreciable stenosis. The vertebral arteries are without appreciable stenosis.  MR angiography head:  The anterior and posterior circulations are without appreciable stenosis, aneurysm formation or occlusion. Right posterior cerebral artery arises from the posterior communicating artery. Mild dilation of the distal aspect of the right basilar artery represents an infundibulum giving off the right superior cerebellar artery. The bilateral posterior inferior cerebellar arteries are visualized.      1.  No findings of acute intracranial abnormality. 2.  Negative noncontrast MRA head and pre and postcontrast MRA neck.  This report was finalized on 1/15/2020  10:23 AM by Dr. Damián Dao M.D.      Mri Angiogram Neck With & Without Contrast    Result Date: 1/15/2020  MRI BRAIN WITHOUT CONTRAST, MRA HEAD WITHOUT CONTRAST, MRA NECK WITH AND WITHOUT CONTRAST  HISTORY: Ataxia.  COMPARISON: None.  TECHNIQUE: MRI was performed of the brain without intravenous administration of gadolinium contrast. 3-D time-of-flight MR angiography was performed of the head. 3-D time-of-flight as well as postcontrast MR angiography was performed of the neck with axial images. Multiple 3-D MIP images were provided.  FINDINGS:  MRI brain:  There is no restricted diffusion. White matter demonstrates normal signal characteristics. There is no extra-axial collection. There is no finding of parenchymal hemorrhage. There is no hydrocephalus. Midline structures are unremarkable.  MR angiography neck:  The origins of the great vessels are widely patent.  The common and internal carotid arteries are without appreciable stenosis. The vertebral arteries are without appreciable stenosis.  MR angiography head:  The anterior and posterior circulations are without appreciable stenosis, aneurysm formation or occlusion. Right posterior cerebral artery arises from the posterior communicating artery. Mild dilation of the distal aspect of the right basilar artery represents an infundibulum giving off the right superior cerebellar artery. The bilateral posterior inferior cerebellar arteries are visualized.      1.  No findings of acute intracranial abnormality. 2.  Negative noncontrast MRA head and pre and postcontrast MRA neck.  This report was finalized on 1/15/2020 10:23 AM by Dr. Damián Dao M.D.      Mri Brain Without Contrast    Result Date: 1/15/2020  MRI BRAIN WITHOUT CONTRAST, MRA HEAD WITHOUT CONTRAST, MRA NECK WITH AND WITHOUT CONTRAST  HISTORY: Ataxia.  COMPARISON: None.  TECHNIQUE: MRI was performed of the brain without intravenous administration of gadolinium contrast. 3-D time-of-flight MR  angiography was performed of the head. 3-D time-of-flight as well as postcontrast MR angiography was performed of the neck with axial images. Multiple 3-D MIP images were provided.  FINDINGS:  MRI brain:  There is no restricted diffusion. White matter demonstrates normal signal characteristics. There is no extra-axial collection. There is no finding of parenchymal hemorrhage. There is no hydrocephalus. Midline structures are unremarkable.  MR angiography neck:  The origins of the great vessels are widely patent.  The common and internal carotid arteries are without appreciable stenosis. The vertebral arteries are without appreciable stenosis.  MR angiography head:  The anterior and posterior circulations are without appreciable stenosis, aneurysm formation or occlusion. Right posterior cerebral artery arises from the posterior communicating artery. Mild dilation of the distal aspect of the right basilar artery represents an infundibulum giving off the right superior cerebellar artery. The bilateral posterior inferior cerebellar arteries are visualized.      1.  No findings of acute intracranial abnormality. 2.  Negative noncontrast MRA head and pre and postcontrast MRA neck.  This report was finalized on 1/15/2020 10:23 AM by Dr. Damián Dao M.D.          During this visit the following were done:  Labs Reviewed [x]    Labs Ordered [x]    Radiology Reports Reviewed [x]    Radiology Ordered []    EKG, echo, and/or stress test reviewed [x]    EEG results reviewed  []    EEG reviewed and interpreted per myself   []    Discussed case with neurointerventionalist or neuroradiologist []    Referring Provider Records Reviewed []    ER Records Reviewed [x]    Hospital Records Reviewed [x]    History Obtained From Family []    Radiological images view and Interpreted per myself []    Case Discussed with referring provider [x]     Decision to obtain and request outside records  []        Assessment and Plan     1.  Vestibular migraine, normal exam and MRI/MRA studies.   - Topamax 25 mg bid.   - Excedrin at onset of symptoms.   - Neurology clinic follow up in 3 months.    2. Uncontrolled DM and smoking.   - Stop smoking.   - BS control.    Thanks,    Electronically signed by Yunior Laughlin MD on 1/15/2020 at 12:07 PM

## 2020-01-15 NOTE — DISCHARGE INSTR - APPOINTMENTS
Out pt appointment with     [facility]    [address] [place] [address]   [phone]

## 2020-01-16 ENCOUNTER — OFFICE VISIT CONVERTED (OUTPATIENT)
Dept: CARDIOLOGY | Facility: CLINIC | Age: 60
End: 2020-01-16
Attending: INTERNAL MEDICINE

## 2020-01-16 ENCOUNTER — OFFICE VISIT CONVERTED (OUTPATIENT)
Dept: UROLOGY | Facility: CLINIC | Age: 60
End: 2020-01-16
Attending: UROLOGY

## 2020-01-16 ENCOUNTER — HOSPITAL ENCOUNTER (OUTPATIENT)
Dept: UROLOGY | Facility: CLINIC | Age: 60
Discharge: HOME | End: 2020-01-16
Attending: UROLOGY

## 2020-01-16 LAB — PSA SERPL-MCNC: 0.18 NG/ML (ref 0–4)

## 2020-01-16 NOTE — PROGRESS NOTES
Case Management Discharge Note      Final Note: Pt was dc'd home         Destination      No service has been selected for the patient.      Durable Medical Equipment      No service has been selected for the patient.      Dialysis/Infusion      No service has been selected for the patient.      Home Medical Care      No service has been selected for the patient.      Therapy      No service has been selected for the patient.      Community Resources      No service has been selected for the patient.        Transportation Services  Private: Car    Final Discharge Disposition Code: 01 - home or self-care

## 2020-04-07 ENCOUNTER — TELEPHONE (OUTPATIENT)
Dept: NEUROLOGY | Facility: CLINIC | Age: 60
End: 2020-04-07

## 2020-04-09 ENCOUNTER — TELEPHONE (OUTPATIENT)
Dept: NEUROLOGY | Facility: CLINIC | Age: 60
End: 2020-04-09

## 2020-04-09 NOTE — TELEPHONE ENCOUNTER
SPOKE WITH PT SON, DENIS. DENIS STATED THAT HE WOULD LET HIS DAD KNOW THAT WE CALLED TO CANCEL HIS APPOINTMENT DUE TO COVID-19 AND WOULD HAVE HIM CALL US BACK.     PLEASE SCHEDULE APPOINTMENT WITH ALEYDA SPARKS.       
Patient's first and last name, , procedure, and correct site confirmed prior to the start of procedure.

## 2020-05-13 ENCOUNTER — TELEMEDICINE CONVERTED (OUTPATIENT)
Dept: CARDIOLOGY | Facility: CLINIC | Age: 60
End: 2020-05-13
Attending: INTERNAL MEDICINE

## 2020-05-21 ENCOUNTER — HOSPITAL ENCOUNTER (OUTPATIENT)
Dept: OTHER | Facility: HOSPITAL | Age: 60
Discharge: HOME | End: 2020-05-21
Attending: PEDIATRICS

## 2020-05-21 LAB
ALBUMIN SERPL-MCNC: 4.3 G/DL (ref 3.5–5)
ALBUMIN/GLOB SERPL: 1.6 {RATIO} (ref 1.4–2.6)
ALP SERPL-CCNC: 106 U/L (ref 56–119)
ALT SERPL-CCNC: 30 U/L (ref 10–40)
ANION GAP SERPL CALC-SCNC: 15 MMOL/L (ref 8–19)
AST SERPL-CCNC: 20 U/L (ref 15–50)
BASOPHILS # BLD AUTO: 0.03 10*3/UL (ref 0–0.2)
BASOPHILS NFR BLD AUTO: 0.5 % (ref 0–3)
BILIRUB SERPL-MCNC: 0.4 MG/DL (ref 0.2–1.3)
BNP SERPL-MCNC: 112 PG/ML (ref 0–900)
BUN SERPL-MCNC: 18 MG/DL (ref 5–25)
BUN/CREAT SERPL: 20 {RATIO} (ref 6–20)
CALCIUM SERPL-MCNC: 9.5 MG/DL (ref 8.7–10.4)
CHLORIDE SERPL-SCNC: 105 MMOL/L (ref 99–111)
CHOLEST SERPL-MCNC: 112 MG/DL (ref 107–200)
CHOLEST/HDLC SERPL: 2.6 {RATIO} (ref 3–6)
CONV ABS IMM GRAN: 0.01 10*3/UL (ref 0–0.2)
CONV CO2: 23 MMOL/L (ref 22–32)
CONV IMMATURE GRAN: 0.2 % (ref 0–1.8)
CONV TOTAL PROTEIN: 7 G/DL (ref 6.3–8.2)
CREAT UR-MCNC: 0.88 MG/DL (ref 0.7–1.2)
DEPRECATED RDW RBC AUTO: 39.3 FL (ref 35.1–43.9)
EOSINOPHIL # BLD AUTO: 0.47 10*3/UL (ref 0–0.7)
EOSINOPHIL # BLD AUTO: 7.3 % (ref 0–7)
ERYTHROCYTE [DISTWIDTH] IN BLOOD BY AUTOMATED COUNT: 12.6 % (ref 11.6–14.4)
GFR SERPLBLD BASED ON 1.73 SQ M-ARVRAT: >60 ML/MIN/{1.73_M2}
GLOBULIN UR ELPH-MCNC: 2.7 G/DL (ref 2–3.5)
GLUCOSE SERPL-MCNC: 311 MG/DL (ref 70–99)
HCT VFR BLD AUTO: 42.3 % (ref 42–52)
HDLC SERPL-MCNC: 43 MG/DL (ref 40–60)
HGB BLD-MCNC: 14.3 G/DL (ref 14–18)
LDLC SERPL CALC-MCNC: 58 MG/DL (ref 70–100)
LYMPHOCYTES # BLD AUTO: 1.74 10*3/UL (ref 1–5)
LYMPHOCYTES NFR BLD AUTO: 27 % (ref 20–45)
MCH RBC QN AUTO: 29.1 PG (ref 27–31)
MCHC RBC AUTO-ENTMCNC: 33.8 G/DL (ref 33–37)
MCV RBC AUTO: 86 FL (ref 80–96)
MONOCYTES # BLD AUTO: 0.53 10*3/UL (ref 0.2–1.2)
MONOCYTES NFR BLD AUTO: 8.2 % (ref 3–10)
NEUTROPHILS # BLD AUTO: 3.66 10*3/UL (ref 2–8)
NEUTROPHILS NFR BLD AUTO: 56.8 % (ref 30–85)
NRBC CBCN: 0 % (ref 0–0.7)
OSMOLALITY SERPL CALC.SUM OF ELEC: 302 MOSM/KG (ref 273–304)
PLATELET # BLD AUTO: 218 10*3/UL (ref 130–400)
PMV BLD AUTO: 9.8 FL (ref 9.4–12.4)
POTASSIUM SERPL-SCNC: 4.4 MMOL/L (ref 3.5–5.3)
RBC # BLD AUTO: 4.92 10*6/UL (ref 4.7–6.1)
SODIUM SERPL-SCNC: 139 MMOL/L (ref 135–147)
TRIGL SERPL-MCNC: 54 MG/DL (ref 40–150)
VLDLC SERPL-MCNC: 11 MG/DL (ref 5–37)
WBC # BLD AUTO: 6.44 10*3/UL (ref 4.8–10.8)

## 2020-05-27 ENCOUNTER — OFFICE VISIT CONVERTED (OUTPATIENT)
Dept: CARDIOLOGY | Facility: CLINIC | Age: 60
End: 2020-05-27
Attending: INTERNAL MEDICINE

## 2020-07-22 ENCOUNTER — OFFICE VISIT CONVERTED (OUTPATIENT)
Dept: OTHER | Facility: HOSPITAL | Age: 60
End: 2020-07-22
Attending: INTERNAL MEDICINE

## 2020-07-23 ENCOUNTER — HOSPITAL ENCOUNTER (OUTPATIENT)
Dept: OTHER | Facility: HOSPITAL | Age: 60
Discharge: HOME | End: 2020-07-23
Attending: INTERNAL MEDICINE

## 2020-07-23 LAB
ALBUMIN SERPL-MCNC: 4.1 G/DL (ref 3.5–5)
ALBUMIN/GLOB SERPL: 1.5 {RATIO} (ref 1.4–2.6)
ALP SERPL-CCNC: 135 U/L (ref 56–119)
ALT SERPL-CCNC: 27 U/L (ref 10–40)
ANION GAP SERPL CALC-SCNC: 16 MMOL/L (ref 8–19)
AST SERPL-CCNC: 19 U/L (ref 15–50)
BASOPHILS # BLD AUTO: 0.01 10*3/UL (ref 0–0.2)
BASOPHILS NFR BLD AUTO: 0.1 % (ref 0–3)
BILIRUB SERPL-MCNC: 0.31 MG/DL (ref 0.2–1.3)
BUN SERPL-MCNC: 22 MG/DL (ref 5–25)
BUN/CREAT SERPL: 22 {RATIO} (ref 6–20)
CALCIUM SERPL-MCNC: 9.6 MG/DL (ref 8.7–10.4)
CHLORIDE SERPL-SCNC: 99 MMOL/L (ref 99–111)
CONV ABS IMM GRAN: 0.02 10*3/UL (ref 0–0.2)
CONV CO2: 25 MMOL/L (ref 22–32)
CONV IMMATURE GRAN: 0.3 % (ref 0–1.8)
CONV TOTAL PROTEIN: 6.8 G/DL (ref 6.3–8.2)
CREAT UR-MCNC: 0.98 MG/DL (ref 0.7–1.2)
DEPRECATED RDW RBC AUTO: 37.4 FL (ref 35.1–43.9)
EOSINOPHIL # BLD AUTO: 0.18 10*3/UL (ref 0–0.7)
EOSINOPHIL # BLD AUTO: 2.6 % (ref 0–7)
ERYTHROCYTE [DISTWIDTH] IN BLOOD BY AUTOMATED COUNT: 11.6 % (ref 11.6–14.4)
ERYTHROCYTE [SEDIMENTATION RATE] IN BLOOD: 13 MM/H (ref 0–20)
FERRITIN SERPL-MCNC: 468 NG/ML (ref 30–300)
GFR SERPLBLD BASED ON 1.73 SQ M-ARVRAT: >60 ML/MIN/{1.73_M2}
GLOBULIN UR ELPH-MCNC: 2.7 G/DL (ref 2–3.5)
GLUCOSE SERPL-MCNC: 464 MG/DL (ref 70–99)
HCT VFR BLD AUTO: 42.3 % (ref 42–52)
HGB BLD-MCNC: 14.3 G/DL (ref 14–18)
IRON SATN MFR SERPL: 30 % (ref 20–55)
IRON SERPL-MCNC: 75 UG/DL (ref 70–180)
LDH SERPL-CCNC: 170 U/L (ref 120–240)
LYMPHOCYTES # BLD AUTO: 1.53 10*3/UL (ref 1–5)
LYMPHOCYTES NFR BLD AUTO: 21.9 % (ref 20–45)
MCH RBC QN AUTO: 29.5 PG (ref 27–31)
MCHC RBC AUTO-ENTMCNC: 33.8 G/DL (ref 33–37)
MCV RBC AUTO: 87.4 FL (ref 80–96)
MONOCYTES # BLD AUTO: 0.27 10*3/UL (ref 0.2–1.2)
MONOCYTES NFR BLD AUTO: 3.9 % (ref 3–10)
NEUTROPHILS # BLD AUTO: 4.99 10*3/UL (ref 2–8)
NEUTROPHILS NFR BLD AUTO: 71.2 % (ref 30–85)
NRBC CBCN: 0 % (ref 0–0.7)
OSMOLALITY SERPL CALC.SUM OF ELEC: 304 MOSM/KG (ref 273–304)
PLATELET # BLD AUTO: 162 10*3/UL (ref 130–400)
PMV BLD AUTO: 9.9 FL (ref 9.4–12.4)
POTASSIUM SERPL-SCNC: 4.6 MMOL/L (ref 3.5–5.3)
RBC # BLD AUTO: 4.84 10*6/UL (ref 4.7–6.1)
SODIUM SERPL-SCNC: 135 MMOL/L (ref 135–147)
T4 FREE SERPL-MCNC: 2 NG/DL (ref 0.9–1.8)
TIBC SERPL-MCNC: 252 UG/DL (ref 245–450)
TRANSFERRIN SERPL-MCNC: 176 MG/DL (ref 215–365)
TSH SERPL-ACNC: <0.005 M[IU]/L (ref 0.27–4.2)
WBC # BLD AUTO: 7 10*3/UL (ref 4.8–10.8)

## 2020-07-24 LAB
C3 SERPL-MCNC: 117 MG/DL (ref 82–167)
C4 SERPL-MCNC: 16 MG/DL (ref 14–44)
CH50 SERPL-ACNC: >60 U/ML
DSDNA AB SER-ACNC: NEGATIVE [IU]/ML
ENA AB SER IA-ACNC: NEGATIVE {RATIO}
IGE SERPL-ACNC: 427 K[IU]/ML (ref 0–24)

## 2020-07-28 ENCOUNTER — HOSPITAL ENCOUNTER (OUTPATIENT)
Dept: CT IMAGING | Facility: HOSPITAL | Age: 60
Discharge: HOME | End: 2020-07-28
Attending: INTERNAL MEDICINE

## 2020-07-30 LAB — CONV ANTI GALACTOSE ALPHA 1,3 IGE: 16.9 KU/L

## 2020-08-05 ENCOUNTER — OFFICE VISIT CONVERTED (OUTPATIENT)
Dept: OTHER | Facility: HOSPITAL | Age: 60
End: 2020-08-05
Attending: INTERNAL MEDICINE

## 2020-08-12 ENCOUNTER — TELEPHONE CONVERTED (OUTPATIENT)
Dept: GASTROENTEROLOGY | Facility: CLINIC | Age: 60
End: 2020-08-12
Attending: NURSE PRACTITIONER

## 2020-09-17 ENCOUNTER — HOSPITAL ENCOUNTER (OUTPATIENT)
Dept: PREADMISSION TESTING | Facility: HOSPITAL | Age: 60
Discharge: HOME | End: 2020-09-17
Attending: INTERNAL MEDICINE

## 2020-09-18 LAB — SARS-COV-2 RNA SPEC QL NAA+PROBE: NOT DETECTED

## 2020-09-22 ENCOUNTER — HOSPITAL ENCOUNTER (OUTPATIENT)
Dept: GASTROENTEROLOGY | Facility: HOSPITAL | Age: 60
Setting detail: HOSPITAL OUTPATIENT SURGERY
Discharge: HOME | End: 2020-09-22
Attending: INTERNAL MEDICINE

## 2020-11-11 ENCOUNTER — TELEMEDICINE CONVERTED (OUTPATIENT)
Dept: CARDIOLOGY | Facility: CLINIC | Age: 60
End: 2020-11-11
Attending: NURSE PRACTITIONER

## 2021-05-10 NOTE — PROCEDURES
"   Procedure Note      Patient Name: Narciso Mejía   Patient ID: [id number]   Sex: Male   YOB: 1960    Primary Care Provider: Marco Nicholson MD   Referring Provider: Marco Nicholson MD    Visit Date: May 27, 2020    Provider: Olivia Min MD   Location: [facility]   Location Address: [address] [place] [address], [facility]   RUSTY Salinas  [id number]   Location Phone: [phone]          FINAL REPORT   TRANSTHORACIC ECHOCARDIOGRAM REPORT    Diagnosis: CHF (Congestive Heart Failure)   Height: 6'2\" Weight: 212 B/P: 131/78 BSA: 2.23   Tech: JLW   MEASUREMENTS:  RVID (Diastole) : RVID. (NORMAL: 0.7 to 2.4 cm max)   LVID (Systole): 4 cm (Diastole): 5.3 cm . (NORMAL: 3.7 - 5.4 cm)   Posterior Wall Thickness (Diastole): 1.2 cm. (NORMAL: 0.8 - 1.1 cm)   Septal Thickness (Diastole): 0.9 cm. (NORMAL: 0.7 - 1.2 cm)   LAID (Systole): 4.1 cm. (NORMAL: 1.9 - 3.8 cm)   Aortic Root Diameter (Diastole): 3.3 cm. (NORMAL: 2.0 - 3.7 cm)   COMMENTS:  COMMENT: The patient underwent 2-D, M-Mode, and Doppler examination, including pulse-wave, continuous-wave, and color-flow Doppler analysis; the study is technically adequate. The following was observed:   FINDINGS:  MITRAL VALVE: Normal. E to F slope was normal. No evidence of any prolapse.   AORTIC VALVE: Normal with three cusps. Normal central closure. No evidence of any obstruction.   TRICUSPID VALVE: Normal.   PULMONIC VALVE: Normal.   LEFT ATRIUM: Normal; no masses seen. LA volume is 24 mL/m2.   AORTIC ROOT: Normal in size and motion.   LEFT VENTRICLE: The left ventricular chamber size is normal. The left ventricular wall thickness is normal. The left ventricular systolic function is mildly reduced with an estimated EF of 45 to 50%. There is moderate anteroapical hypokinesis.,   RIGHT ATRIUM: Normal.   RIGHT VENTRICLE: Normal size and function.   PERICARDIUM: No effusion.   INFERIOR VENA CAVA: Diameter is 1.8 cm with greater than 50% reduction with inspiration. "   DOPPLER: Pulse-wave, continuous wave, and color-flow Doppler evaluation was performed. E/A ratio is 0.9. DT= 201 msec. IVRT is 92 msec. E/E' is 8. There is trace mitral valve regurgitation of no hemodynamic consequence.   Faxed: 05/28/2020      CONCLUSION:  1.  Normal left ventricular chamber size with mildly reduced left ventricular systolic function with an estimated        ejection fraction of 45 to 50% with anteroapical hypokinesis.   2.  Trace mitral valve regurgitation of no hemodynamic consequence.      Olivia Min MD, FACC  PM/pap    This note was transcribed by Mimi Love.  pap/pm  The above service was transcribed by Mimi Love, and I attest to the accuracy of the note.  PM                 Electronically Signed by: Florinda Love-, Other -Author on May 28, 2020 03:31:42 PM  Electronically Co-signed by: Olivia Min MD -Reviewer on May 29, 2020 05:03:22 PM

## 2021-05-13 NOTE — PROGRESS NOTES
Progress Note      Patient Name: Narciso Mejía   Patient ID: [id number]   Sex: Male   YOB: 1960    Primary Care Provider: Marco Nicholson MD   Referring Provider: Marco Nicholson MD    Visit Date: August 12, 2020    Provider: LOIS Hart   [place] [facility] [place]   Location Address: [address] [place] [address]  [id number]   Location Phone: [phone]          Chief Complaint  · lump in his chest   · hiatal hernia       History Of Present Illness  Narciso Mejía is a 60 year old /White male who presents to the office today.      Patient has not been seen in the office since June 2018 he had been having rectal pain, MRI of the pelvis was negative in 2018, dysphagiapatient had a negative CT of the neck, negative GES.  Patient also had a history of elevated alk phos and had a negative AMA, negative GGT in 2017, and neg. liver ultrasound.  LAST EGD 2015--large hiatal hernia, duodenal bx neg, stomach bx - negative except chronic inflammation  LAST Colonoscopy 7/20/2018: Diffuse melanosisbiopsy consistent with melanosis coli, 5 mm polyp in the sigmoid colonadenomatous, internal hemorrhoids    CT of the abdomen and pelvis with contrast 1/12/2020: Moderate size hiatal hernia unchanged, mild fatty infiltration of the liver, normal pancreas 2.5 cm hernia defect in the umbilical region  CT chest 7/28/2020--showed thickening of esophagus and large hiatal hernia    Pt states he's had some sensation of food and liquids going around something in his esophagus, midchest area, saw Dr Maguire, CT's ordered as above.   Pt states he has hx of hives and itching, was dx w alpha-gal allergy by immunology. Has been on Prednisone and dysphagia sx are improved but c/o still getting choked w eating/drinking.   +HB--was on Ranitidine, off d/t recall and hasn't taken anything else except occasionally takes a prilosec.  States Linzess not working but he can no longer take d/t gel cap--states it is  an issue w alpha-gal.       Past Medical History  Anemia; Angina pectoris; Anxiety and depression; Arthritis; Asthma; Bipolar disorder; Broken Bones; Bulging Disc; CAD (coronary artery disease); Deafness; Deviated nasal septum; DM (diabetes mellitus); Dysphagia; Erectile dysfunction; Forgetfulness; GERD (gastroesophageal reflux disease); Gout; Heart attack; Hemorrhoids; HLD (hyperlipidemia); HTN (hypertension); Hypoparathyroidism; Hypothyroidism; Leg pain; Limb Swelling; MI (myocardial infarction); Muscle cramps; Muscular dystrophy; Nasal obstruction; Neurologic disorder, unspecified; Night sweat; LYNNETTE (obstructive sleep apnea); Perineal pain in male; Rectal bleeding; Seasonal allergies; Sinus Trouble; Skin Disease/Psoriasis/eczema; Trouble swallowing; Ulcer; Vestibular migraine         Past Surgical History  Appendectomy; cardiac stents; Carpal Tunnel Release; Colonoscopy; EGD; Septoplasty with bilateral reduction of nasal turbinates; Tonsillectomy; umbilical hernia repair         Medication List  Name Date Started Instructions   allopurinol 300 mg oral tablet  take 1 tablet (300 mg) by oral route once daily   atorvastatin 40 mg oral tablet 10/16/2019 TAKE ONE TABLET BY MOUTH AT BEDTIME   carvedilol 3.125 mg oral tablet  take 1 tablet (3.125 mg) by oral route 2 times per day with food   hydroxyzine HCl 50 mg oral tablet  take 1 tablet (50 mg) by oral route 4 times per day   Jardiance 10 mg oral tablet  take 1 tablet (10 mg) by oral route once daily in the morning   levothyroxine 25 mcg oral tablet  take 1 tablet (25 mcg) by oral route once daily   meloxicam 7.5 mg oral tablet  take 1 tablet (7.5 mg) by oral route once daily   metformin 1,000 mg oral tablet  take 1 tablet (1,000 mg) by oral route 2 times per day with morning and evening meals   Novolin 70-30 FlexPen U-100 100 unit/mL (70-30) subcutaneous insulin pen  inject by subcutaneous route as per insulin protocol   Novolin 70/30 U-100 Insulin 100 unit/mL  "(70-30) subcutaneous suspension  inject by subcutaneous route as per insulin protocol   spironolactone 25 mg oral tablet 12/16/2019 take 1 tablet (25 mg) by oral route every other day   tamsulosin 0.4 mg Oral Capsule, Ext Release 24 hr 05/23/2012 take 1 capsule (0.4 mg) by oral route once daily 1/2 hour following the same meal each day for 30 days   testosterone cypionate 200 mg/mL intramuscular oil  --    topiramate 25 mg oral tablet  take 1 tablet (25 mg) by oral route once daily   Trulicity 1.5 mg/0.5 mL subcutaneous pen injector  inject 0.5 milliliter (1.5 mg) by subcutaneous route every 7 days in the abdomen, thigh, or upper arm rotating injection sites         Allergy List  bacitracin; PENICILLINS         Family Medical History  Stroke; Heart Disease; Cancer, Unspecified; Renal Calculus; Family history of colon cancer; Diabetes; Family history of certain chronic disabling diseases; arthritis; Family history of Arthritis; Family history of stroke; Family history of heart disease; Family history of diabetes mellitus         Social History  Alcohol (Never); Disabled; ; Recreational Drug Use (Never); Retired; Student; Tobacco (Former)         Review of Systems  · Constitutional  o Admits  o : good general health lately, no acute distress  · Gastrointestinal  o Denies  o : additional gastrointestinal symptoms except as noted in the HPI  · Psychiatric  o Admits  o : pleasant affect      Vitals  Date Time BP Position Site L\R Cuff Size HR RR TEMP (F) WT  HT  BMI kg/m2 BSA m2 O2 Sat HC       01/16/2020 02:26 /80 Sitting    82 - R  97.8 212lbs 0oz 6'  2\" 27.22 2.24     08/12/2020 11:17 /79 Sitting    75 - R  97.8 221lbs 4oz 6'  2\" 28.41 2.29           Physical Examination  · Constitutional  o Appearance  o : well developed, well-nourished, in no acute distress  · Head and Face  o Head  o :   § Inspection  § : atraumatic, normocephalic  · Eyes  o Sclerae  o : sclerae white, no sclerae " icterus  · Neck  o Inspection/Palpation  o : supple  · Respiratory  o Respiratory Effort  o : breathing unlabored  o Inspection of Chest  o : normal appearance, no retractions  · Cardiovascular  o Peripheral Vascular System  o :   § Extremities  § : no cyanosis, clubbing or edema  · Gastrointestinal  o Abdominal Examination  o : soft, nontender to palpation  · Skin and Subcutaneous Tissue  o General Inspection  o : no lesions present, no rashes present  · Neurologic  o Mental Status Examination  o :   § Orientation  § : grossly oriented to person, place and time  § Speech/Language  § : communication ability within normal limits, voice quality normal, articulation of speech normal, no evidence of aphasia  § Attention  § : attention normal, concentration abilities normal  o Sensation  o : grossly intact  o Gait and Station  o :   § Gait Screening  § : normal gait  · Psychiatric  o General  o : Alert and oriented x3  o Mood and Affect  o : Mood and affect are appropriate to circumstances          Assessment  · Pre-op exam     V72.84/Z01.818  · Abnormal finding on GI tract imaging     793.4/R93.3  thickened esophagus  · Esophageal dysphagia     787.20/R13.10  · Elevated serum alkaline phosphatase level     790.5/R74.8  · Fatty liver     571.8/K76.0  · Hiatal hernia     553.3/K44.9      Plan  · Orders  o Acute hepatitis panel (HAV IgM, HbcAb IgM, HbsAg, HCV) (62909, 67175, 98595, 71507, 80658) - - 08/12/2020  o Copper level (48776) - - 08/12/2020  o Ceruloplasmin level (79667) - - 08/12/2020  o Anti-Smooth Muscle Antibody (ASMA) [facility] (43805) - - 08/12/2020  o Anti-mitochondrial antibody assay (12661) - - 08/12/2020  o Alpha-1-Antitrypsin/Phenotype [facility] (83404, 68356) - - 08/12/2020  o Iron Profile (Iron 60636 TIBC 37142 and Transferrin 02744) (IRONP) - - 08/12/2020  o PT/INR (24036) - - 08/12/2020  o AFP ser (15027) - - 08/12/2020  o SHERMAN (antinuclear antibody profile) by enzyme immunoassay (88217) - -  08/12/2020  o Ferritin ser/plas (74375) - - 08/12/2020  o PALOMINO Fibrosure [facility] (drawn at [facility] only and must be fasting 8 hours; requires HT and WT) (15906, 54518, 98686, 97600, 67800, 19450, 30559, 06883, 52655, 55320) - - 08/12/2020  o Immunoelectrophoresis, Serum [facility] (37539, 66541, 36044, 45037) - - 08/12/2020  o [facility] Pre-Op Covid-19 Screening (40913) - - 08/12/2020  · Medications  o Protonix 20 mg oral tablet,delayed release (DR/EC)   SIG: take 1 tablet (20 mg) by oral route once daily for 90 days   DISP: (90) tablets with 1 refills  Prescribed on 08/12/2020     · Instructions  o Please Sign Permit for: EGD  o Indication: hiatal hernia, dysphagia, heartburn  o Surgical Risk and Benefits: Possible risks/complications, benefits, and alternatives to surgical or invasive procedure have been explained to patient and/or legal guardian; Patient has been evaluated and can tolerate anesthesia and/or sedation. Risks, benefits, and alternatives to anesthesia and sedation have been explained to patient and/or legal guardian.  o Follow Up after Procedure.  o Encouraged to follow-up with Primary Care Provider for preventative care.  o Patient was educated/instructed on their diagnosis, treatment and medications prior to discharge from the clinic today.  o Patient instructed to seek medical attention urgently for new or worsening symptoms.  o Patient counseled on low carb diet.            Electronically Signed by: LOIS Hart -Author on August 17, 2020 05:31:53 PM

## 2021-05-13 NOTE — PROGRESS NOTES
Progress Note      Patient Name: Narciso Mejía   Patient ID: [id number]   Sex: Male   YOB: 1960    Primary Care Provider: Marco Nicholson MD   Referring Provider: Marco Nicholson MD    Visit Date: November 11, 2020    Provider: LOIS Umanzro   Location: [facility]   Location Address: [address] [place] [address], [address]   [place]  [id number]   Location Phone: [phone]          Chief Complaint  · Fatigue   · Weakness      History Of Present Illness  TELEHEALTH TELEPHONE VISIT  Narciso Mejía is a 60 year old /White male who is presenting for evaluation via telehealth telephone visit. Verbal consent obtained before beginning visit. Telehealth due to COVID-19. He was supposed to have an office visit with me today, but he states he has been feeling tired and weak for the past 5 days, and he did not feel well enough to come to his visit today. He denies any cough. No fevers. He does have some chills, and he just feels plain worn out. He has not eaten much, except just chicken noodle soup over the last 4 days. He did have a telehealth visit with his PCP 2 days ago, but nothing was done at that time, and he continues to feel poorly. He denies any chest pain or pressure. No palpitations, shortness of breath, swelling. No cough. No dizziness, just fatigue. No syncope, PND or orthopnea. He states his blood pressure is not low or his sugars low when he checks them.   Provider spent 10 minutes with the patient during the telehealth visit.   The following staff were present during this visit: Provider only.   PAST MEDICAL HISTORY: Coronary artery disease; previous anterior wall myocardial infarction; ischemic cardiomyopathy; chronic combined heart failure.   CURRENT MEDICATIONS: include NovoLog insulin; Metformin 1000 mg b.i.d.; Spironolactone 25 mg daily; Carvedilol 3.125 mg b.i.d.; ASA 81 mg daily; Levothyroxine 200 mcg daily; Topamax 25 mg b.i.d.; Meloxicam 7.5 mg daily; Doxazosin 4 mg  daily; Allopurinol 300 mg daily; Pantoprazole 150 mg daily; Humalog insulin; testosterone 200 mg every 10 days; MiraLAX. The dosage and frequency of the medications were reviewed with the patient.       Review of Systems  · Cardiovascular  o Denies  o : palpitations (fast, fluttering, or skipping beats), swelling (feet, ankles, hands), shortness of breath while walking or lying flat, chest pain or angina pectoris   · Respiratory  o Denies  o : chronic or frequent cough, asthma or wheezing      Vitals     Per patient, at-home blood pressure is 133/89, heart rate of 88.  Weight: 215.       Physical Examination     Labs not done recently.           Assessment     1.  Weakness and fatigue.  2.  Coronary artery disease with previous myocardial infarction, without angina.  3.  Ischemic cardiomyopathy with chronic combined heart failure, Class II, stable.  4.  Insulin-dependent diabetes mellitus.       Plan     1.  In the past he has had the same episode when he was dehydrated and was treated in the emergency room       with IV fluids, and this could be a possible cause at this time.  2.  We also discussed signs and symptoms of COVID testing and need for possible COVID check.  3.  Encouraged him to go to the emergency room if he continues to worsen.  4.  Will check a lipid panel, CMP, thyroid and hemoglobin A1c as soon as possible and adjust hypertensive        medications if needed.  Will send a copy of the labs to his PCP, who monitors his thyroid and diabetes.  5.  Will follow up in 6 months with labs or earlier if needed.    Celestina alcantar/nitesh           This note was transcribed by Johanne Decker.  nitesh/barbara  The above service was transcribed by Johanne Decker, and I attest to the accuracy of the note.  BARBARA                   Electronically Signed by: Johanne Decker-, -Author on November 17, 2020 06:27:15 AM  Electronically Co-signed by: LOIS Umanzor -Reviewer on November 19, 2020 08:47:44 AM

## 2021-05-13 NOTE — PROGRESS NOTES
Progress Note      Patient Name: Narciso Mejía   Patient ID: [id number]   Sex: Male   YOB: 1960    Primary Care Provider: Marco Nicholson MD   Referring Provider: Marco Nicholson MD    Visit Date: May 13, 2020    Provider: Olivia Min MD   Location: [facility]   Location Address: [address] [place] [address], [facility]   [place]  [id number]   Location Phone: [phone]          Chief Complaint  · Follow-up of congestive heart failure       History Of Present Illness  Video Conferencing Visit  Narciso Mejía is a 60-year-old gentleman who is presenting for evaluation via video conferencing via FORMAT OF VIDEO VISIT . Verbal consent obtained before beginning visit. Telehealth due to COVID-19. He has congestive heart failure, coronary artery disease, previous myocardial infarction, who is doing much better. His dizzy spells have dramatically improved since he was put on Topamax. He denies chest pain, PND, orthopnea, palpitations, dizziness or syncope. His exercise capacity has improved.   The following staff were present during this visit: Provider only.   PAST MEDICAL HISTORY: Positive for coronary artery disease, previous anterior wall myocardial infarction, ischemic cardiomyopathy, chronic combined heart failure.   FAMILY HISTORY: Positive for diabetes, hypertension and heart disease.   PSYCHOSOCIAL HISTORY: Positive for mood changes and depression. He never used alcohol. He previously used tobacco but quit.   CURRENT MEDICATIONS: include NovoLog; Metformin 1000 mg b.i.d; Spironolactone 25 mg daily; Carvedilol 3.125 mg 2 tablets in the morning, 1 at night; ASA 81 mg daily; Levothyroxine 200 mcg daily; Atorvastatin 40 mg daily; Topamax 25 mg b.i.d.; Meloxicam 7.5 mg daily; Tamsulosin 0.4 mg daily; Allopurinol 300 mg daily; Omeprazole 150 mg daily; Vitamin D3; Humalog; testosterone 200 mg every 10 days; Linzess 145 mcg daily; stool softener p.r.n; garcinia cambosia; Biotin. The dosage  and frequency of the medications were reviewed with the patient.       Review of Systems  · Cardiovascular  o Admits  o : swelling (feet, ankles, hands), shortness of breath while walking or lying flat  o Denies  o : palpitations (fast, fluttering, or skipping beats), chest pain or angina pectoris   · Respiratory  o Admits  o : chronic or frequent cough  o Denies  o : asthma or wheezing      Vitals     Per patient, at-home blood pressure is 117/74.           Assessment     1.  Congestive heart failure, chronic combined, with reduced ejection fraction with Class II symptomatology        improving.  2.  Hypertension controlled.  3.  Hyperlipidemia.  4.  Coronary artery disease, without angina pectoris, with previous myocardial infarction.         Plan     1.  I have asked him to do a blood pressure log and come in for an echocardiogram in the next few weeks to       determine his ejection fraction.  If his EF is less than 35%, he is going to need a defibrillator.  2.  Advised him to do blood work prior to his appointment.  3.  Further management decisions contingent upon the above evaluation.    Olivia Min M.D., Located within [facility]  pmm/nitesh           This note was transcribed by Johanne Decker.  dmd/pmm  The above service was transcribed by Johanne Decker, and I attest to the accuracy of the note.  PMM               Electronically Signed by: Johanne Decker-, -Author on May 20, 2020 03:57:48 AM  Electronically Co-signed by: Olivia Min MD -Reviewer on May 25, 2020 08:45:26 PM

## 2021-05-15 VITALS
SYSTOLIC BLOOD PRESSURE: 108 MMHG | HEART RATE: 90 BPM | HEIGHT: 74 IN | WEIGHT: 220 LBS | DIASTOLIC BLOOD PRESSURE: 76 MMHG | BODY MASS INDEX: 28.23 KG/M2

## 2021-05-15 VITALS
DIASTOLIC BLOOD PRESSURE: 69 MMHG | SYSTOLIC BLOOD PRESSURE: 120 MMHG | HEIGHT: 73 IN | TEMPERATURE: 97.9 F | HEART RATE: 89 BPM | BODY MASS INDEX: 29.95 KG/M2 | WEIGHT: 226 LBS

## 2021-05-15 VITALS
BODY MASS INDEX: 29.27 KG/M2 | HEIGHT: 74 IN | DIASTOLIC BLOOD PRESSURE: 77 MMHG | SYSTOLIC BLOOD PRESSURE: 119 MMHG | WEIGHT: 228.06 LBS

## 2021-05-15 VITALS
WEIGHT: 227 LBS | SYSTOLIC BLOOD PRESSURE: 131 MMHG | BODY MASS INDEX: 29.13 KG/M2 | DIASTOLIC BLOOD PRESSURE: 86 MMHG | HEIGHT: 74 IN

## 2021-05-15 VITALS
DIASTOLIC BLOOD PRESSURE: 80 MMHG | HEART RATE: 82 BPM | SYSTOLIC BLOOD PRESSURE: 104 MMHG | WEIGHT: 212 LBS | HEIGHT: 74 IN | TEMPERATURE: 97.8 F | BODY MASS INDEX: 27.21 KG/M2

## 2021-05-15 VITALS
TEMPERATURE: 97.8 F | HEART RATE: 75 BPM | BODY MASS INDEX: 28.4 KG/M2 | HEIGHT: 74 IN | DIASTOLIC BLOOD PRESSURE: 79 MMHG | SYSTOLIC BLOOD PRESSURE: 130 MMHG | WEIGHT: 221.25 LBS

## 2021-05-15 VITALS
WEIGHT: 228 LBS | DIASTOLIC BLOOD PRESSURE: 68 MMHG | SYSTOLIC BLOOD PRESSURE: 112 MMHG | HEART RATE: 72 BPM | HEIGHT: 74 IN | BODY MASS INDEX: 29.26 KG/M2

## 2021-05-16 VITALS — TEMPERATURE: 97.1 F | BODY MASS INDEX: 26.98 KG/M2 | HEIGHT: 74 IN | WEIGHT: 210.25 LBS

## 2021-05-16 VITALS — TEMPERATURE: 97.2 F | WEIGHT: 201 LBS | BODY MASS INDEX: 25.8 KG/M2 | HEIGHT: 74 IN

## 2021-05-16 VITALS
HEART RATE: 94 BPM | SYSTOLIC BLOOD PRESSURE: 135 MMHG | RESPIRATION RATE: 16 BRPM | HEIGHT: 74 IN | TEMPERATURE: 97.2 F | DIASTOLIC BLOOD PRESSURE: 85 MMHG | BODY MASS INDEX: 26.49 KG/M2 | WEIGHT: 206.37 LBS | OXYGEN SATURATION: 97 %

## 2021-05-16 VITALS
WEIGHT: 193 LBS | DIASTOLIC BLOOD PRESSURE: 82 MMHG | TEMPERATURE: 98.6 F | SYSTOLIC BLOOD PRESSURE: 119 MMHG | HEART RATE: 106 BPM | BODY MASS INDEX: 24.77 KG/M2 | HEIGHT: 74 IN

## 2021-05-16 VITALS
SYSTOLIC BLOOD PRESSURE: 117 MMHG | WEIGHT: 211 LBS | TEMPERATURE: 97.7 F | HEIGHT: 74 IN | DIASTOLIC BLOOD PRESSURE: 73 MMHG | HEART RATE: 91 BPM | BODY MASS INDEX: 27.08 KG/M2

## 2021-05-16 VITALS
WEIGHT: 205 LBS | BODY MASS INDEX: 27.77 KG/M2 | HEART RATE: 96 BPM | SYSTOLIC BLOOD PRESSURE: 120 MMHG | HEIGHT: 72 IN | DIASTOLIC BLOOD PRESSURE: 76 MMHG

## 2021-05-27 ENCOUNTER — OFFICE VISIT CONVERTED (OUTPATIENT)
Dept: GASTROENTEROLOGY | Facility: CLINIC | Age: 61
End: 2021-05-27
Attending: NURSE PRACTITIONER

## 2021-05-28 VITALS
WEIGHT: 220.2 LBS | OXYGEN SATURATION: 97 % | TEMPERATURE: 97.8 F | TEMPERATURE: 97.5 F | OXYGEN SATURATION: 98 % | SYSTOLIC BLOOD PRESSURE: 126 MMHG | HEIGHT: 74 IN | HEART RATE: 89 BPM | DIASTOLIC BLOOD PRESSURE: 84 MMHG | BODY MASS INDEX: 28.26 KG/M2 | HEART RATE: 97 BPM | SYSTOLIC BLOOD PRESSURE: 128 MMHG | WEIGHT: 222.4 LBS | DIASTOLIC BLOOD PRESSURE: 87 MMHG | RESPIRATION RATE: 16 BRPM | HEIGHT: 74 IN | BODY MASS INDEX: 28.54 KG/M2

## 2021-05-28 NOTE — PROGRESS NOTES
Patient: LEEANNA HENAO     Acct: [id number]     Report: #[id number]  UNIT #: [id number]     : 1960    Encounter Date:2020  PRIMARY CARE:   ***Signed***  --------------------------------------------------------------------------------------------------------------------  Progress Note      DATE: 20      Primary Care Provider:  LUIS ESQUIVEL      Referring Provider:  SELF,REFERRED      Chief Complaint      FU Dysphagia            Subjective      60-year-old white male came in because of multiple problems.  He had problems     with feeling up having a lump in his chest, feeling like there is something in     his throat when he swallows water or any solid food.  Patient complains of     shortness of air.  Patient had hives continuously during the year.  He sees an      presently.            He is here for his work up.            Past Med/Surg History            Past Med/Surg History:   Hypertension             Diabetes Mellitus             Heart Disease (2 Heart attacks)             Blood Clots             No Cancer             No Lung Disease             No Kidney Disease             Other (Neuropathy, vestibular migraine, Lyme's disease, hypothyroidism, gout)            Social History      Social History:  Tobacco Use (Former smoker quit ), Alcohol Use (6 packs of     beer per week); No Recreational Drug use            Allergies      Coded Allergies:             BACITRACIN (Verified  Allergy, Severe, RASH AND SWELLING, SORES, 18)           PENICILLINS (Verified  Allergy, Severe, RESP. DIFFICULTY, SWELLING,     8)            Medications      Medications    Last Reconciled on 20 19:02 by RADHA TELLEZ MD MDI-Albuterol (Proair HFA) 8.5 Gm Hfa.aer.ad      1 PUFFS INH Q4H PRN for SHORTNESS OF BREATH, #1 MDI 0 Refills         Reported         20       predniSONE (predniSONE) 20 Mg Tablet      20 MG PO BID, TAB 0 Refills         Reported         20        Doxycycline Hyclate (Doxycycline Hyclate*) 100 Mg Capsule      100 MG PO BID, #14 CAP 0 Refills         Reported         7/22/20       Biotin (Biotin) 2,500 Mcg Cap      1000 MCG PO QDAY, #30 CAP         Reported         7/22/20       Turmeric Root Extract (Turmeric) 1 Each Capsule      500 MG PO TID, CAP         Reported         7/22/20       (Larginine)   No Conflict Check      1000 MG PO QDAY         Reported         7/22/20       Flaxseed Oil (Flax Seed Oil) 1,000 Mg Capsule      1000 MG PO QDAY, CAP         Reported         7/22/20       Dulaglutide (Trulicity) 1.5 Mg/0.5 Ml Pen.injctr      1.5 MG SUBQ Q7DAY, #1 PEN.INJCTR         Reported         7/22/20       Insulin Human Nph (novoLIN N VIAL) 100 Unit/1 Ml Vial      5 UNITS SUBQ sliding scale, #1 VIAL 0 Refills         Reported         7/22/20       Topiramate (Topamax*) 25 Mg Tablet      25 MG PO BID, TAB         Reported         7/22/20       metFORMIN ER (metFORMIN ER) 500 Mg Tab.er.24      500 MG PO BID, #30 TAB.ER 0 Refills         Reported         7/22/20       Cholecalciferol (Vitamin D3) (Vitamin D3) 2,000 Unit Capsule      2000 UNITS PO QDAY for 30 Days, #30 CAP         Reported         7/22/20       Levothyroxine (Levothyroxine) 0.2 Mg Tablet      0.2 MG PO QDAY@07, #30 TAB 0 Refills         Reported         7/22/20       Docusate Sodium (Docusate Sodium) 100 Mg Capsule      100 MG PO BID, #60 CAP         Prov: JOSEFINA CANTU LUTHER         7/31/18       Linaclotide (Linzess) 145 Mcg Capsule      145 MCG PO QDAY, CAP         Reported         4/23/18       Atorvastatin (Atorvastatin) 40 Mg Tablet      40 MG PO HS, #30 TAB 0 Refills         Reported         4/23/18       Carvedilol (Carvedilol) 3.125 Mg Tablet      3.125 MG PO BID, #60 TAB 0 Refills         Reported         4/23/18       Meloxicam (Meloxicam*) 7.5 Mg Tablet      7.5 MG PO HS, #30 TAB 0 Refills         Reported         4/23/18       Spironolactone (Spironolactone*) 25 Mg Tablet      25  MG PO QAM, #60 TAB 0 Refills         Reported         9/1/15       Allopurinol (Allopurinol) 300 Mg Tablet      300 MG PO QDAY, #30 TAB 0 Refills         Reported         9/1/15       Aspirin EC (Aspirin EC) 81 Mg Tablet.dr      81 MG PO QDAY, TAB         Reported         8/6/13       Tamsulosin HCL (Flomax) 0.4 Mg Cap.sr.24h      0.4 MG PO HS         Reported         4/21/13      Current Medications      Current Medications Reviewed 8/5/20            Pain Assessment      Pain Intensity:  0      Description:  None            Review of Systems      General:  Anxiety, Fatigue Scale: (0), Pain Scale: (0)      HEENT:  No Dysphagia      Respiratory:  No Cough; Shortness of Air      Cardiovascular:  No Chest Pain; Pedal Edema      Gastrointestinal:  No Nausea, No Vomiting; Constipation, Appetite Fair (Fair)      Genitourinary:  No Nocturia      Musculoskeletal:  No Joint Effusions      Endocrine:  No Heat Intolerance, No Cold Intolerance      Hematologic:  No Bleeding      Allergic/Immunologic:  No Hives      Psychological:  Anxiety; No Depression      Neurological:  No Headaches; Dizziness, Numbness (Legs, Feet)      Skin:  No Rash, No Open Wounds            Vitals      Height 6 ft 2 in / 187.96 cm      Weight 220 lbs 3.2 oz / 99.059637 kg      BSA 2.26 m2      BMI 28.3 kg/m2      Temperature 97.8 F / 36.56 C      Pulse 89      Blood Pressure 128/87      Pulse Oximetry 98%            Exam      Constitutional:  No acute distress, Conversant      Psychological:  Appropriate affect, Appropriate mood, Intact judgement, Alert            Lab Results      Laboratory Tests      7/23/20 11:40            Laboratory Tests            Test       5/21/20      08:08 7/23/20      11:40 7/30/20      21:57             NT-Pro-B-Type Natriuretic      Peptide 112 pg/mL      (0-900)                           Triglycerides Level       54 mg/dL      ()                           Cholesterol Level       112 mg/dL      (107-200)                            LDL Cholesterol       58 mg/dL      ()                           VLDL Cholesterol       11 mg/dL      (5-37)                           HDL Cholesterol       43 mg/dL      (40-60)                           Cholesterol/HDL Ratio       2.6 NA      (3.0-6.0)                           White Blood Count              7.00 10*3/uL      (4.80-10.80)                    Red Blood Count              4.84 10*6/uL      (4.70-6.10)                    Hemoglobin              14.3 g/dL      (14.0-18.0)                    Hematocrit              42.3 %      (42.0-52.0)                    Mean Corpuscular Volume              87.4 fL      (80.0-96.0)                    Mean Corpuscular Hemoglobin              29.5 pg      (27.0-31.0)                    Mean Corpuscular Hemoglobin      Concent        33.8      (33.0-37.0)                    Red Cell Distribution Width              11.6 %      (11.6-14.4)                    RDW Standard Deviation              37.4 fL      (35.1-43.9)                    Platelet Count              162 10*3/uL      (130-400)                    Mean Platelet Volume              9.9 fL      (9.4-12.4)                    Granulocytes (%)              71.2 %      (30.0-85.0)                    Granulocytes #              4.99 10*3/uL      (2.00-8.00)                    Lymphocytes # (Auto)              1.53 10*3/uL      (1.00-5.00)                    Monocytes # (Auto)              0.27 10*3/uL      (0.20-1.20)                    Eosinophils # (Auto)              0.18 10*3/uL      (0.00-0.70)                    Basophils # (Auto)              0.01 10*3/uL      (0.00-0.20)                    Immature Granulocytes %              0.3 %      (0.0-1.8)                    Lymphocytes %              21.9 %      (20.0-45.0)                    Monocytes %              3.9 %      (3.0-10.0)                    Eosinophils %              2.6 %      (0.0-7.0)                    Basophils %               0.1 %      (0.0-3.0)                    Nucleated Red Blood Cells %              0.00 %      (0.00-0.70)                    Immature Granulocytes #              0.02 10*3/uL      (0.00-0.20)                    Erythrocyte Sedimentation Rate  13 mm/h (0-20)               Sodium Level              135 mmol/L      (135-147)                    Potassium Level              4.6 mmol/L      (3.5-5.3)                    Chloride Level              99 mmol/L      ()                    Carbon Dioxide Level              25 mmol/L      (22-32)                    Anion Gap              16 mmol/L      (8-19)                    Blood Urea Nitrogen              22 mg/dL      (5-25)                    Creatinine              0.98 mg/dL      (0.70-1.20)                    Glomerular Filtration Rate      Calc        >60      mL/min/{1.73_m2}                    BUN/Creatinine Ratio              22 {ratio}      (6-20)                    Glucose Level              464 mg/dL      (70-99)                    Serum Osmolality  304 (273-304)               Calcium Level              9.6 mg/dL      (8.7-10.4)                    Iron Level              75 ug/dL      ()                    Total Iron Binding Capacity              252 ug/dL      (245-450)                    Percent Iron Saturation  30 % (20-55)               Transferrin              176.00 mg/dL      (215..00)                    Ferritin              468 ng/mL      ()                    Total Bilirubin              0.31 mg/dL      (0.20-1.30)                    Aspartate Amino Transf      (AST/SGOT)        19 U/L (15-50)                           Alanine Aminotransferase      (ALT/SGPT)        27 U/L (10-40)                           Alkaline Phosphatase              135 U/L      ()                    Lactate Dehydrogenase              170 U/L      (120-240)                    Total Protein              6.8 g/dL      (6.3-8.2)                     Albumin              4.1 g/dL      (3.5-5.0)                    Globulin              2.7 g/dL      (2.0-3.5)                    Albumin/Globulin Ratio              1.5 {ratio}      (1.4-2.6)                    Galactose-alpha-1,3-Galactose      IgE        16.90 kU/L      (<0.10)                    Thyroid Stimulating Hormone      (TSH)        <0.005 m(iU)/L      (0.270-4.200)                    Free Thyroxine              2.0 ng/dL      (0.9-1.8)                    Immunoglobulin E              427 k(iU)/mL      (0-24)                    Anti-Nuclear Antibody Screen              NEGATIVE      {ratio}                    Anti-Double Strand DNA      Antibody        NEGATIVE      (IU)/mL                    Complement C3              117 mg/dL      ()                    Complement C4              16 mg/dL      (14-44)                    Total Complement (CH50)  >60 U/mL (>41)               Lab Scanned Report              LAB FINAL      CUMULATIVES -            Radiology Impressions      CAT scan of the chest with and without contrast showed evidence of family     temperature groundglass type nodule in the left lower lobe.  Additional 3 mm no    ncalcified solid-appearing nodule in the left lower lobe.  Additional 4 mm     nodule in the right upper lobe.  There is a 6 mm nodule in the right middle     lobe.  No axillary, supraclavicular or mediastinal adenopathy.  There is a     moderate to large hiatal hernia present.  There is thickening of the distal     esophagus which is nonspecific.            Ultrasound of the axillary in the right showed multiple benign-appearing right     axillary lymph nodes.            Impression/Problem List      Dysphagia etiology to be determined -review of records showed there was some     evidence of difficulty in clearing the vallecula and pharynx despite multiple     swallows and chin tuck and liquid wash predisposing to significant residual on     previous examination.  There  is also irregularity with epiglottis and     preferential bolus flow towards the left on the previous examination.      Large hiatal hernia      Thickening of the distal of esophagus, nonspecific      Urticaria      Eosinophilia probably allergic in etiology      Diabetes mellitus uncontrolled      History of coronary disease      Hyperlipidemia      Hypothyroidism uncontrolled      History of Lyme's disease      Hypertension      Multiple lung nodules      Positive anti-galactose alpha 1, 3      Diabetes mellitus type 2, uncontrolled - E11.65            Hypothyroidism, unspecified - E03.9            Hiatal hernia - K44.9            Thickening of esophagus - K22.8            History of coronary artery disease - Z86.79            Vitamin D deficiency - E55.9            Gout - M10.9            Vestibular migraine - G43.109            History of Lyme disease - Z86.19            Notes      New Medications      * predniSONE 20 MG TABLET: 20 MG PO BID      * MDI-Albuterol (Proair HFA) 8.5 GM HFA.AER.AD: 1 PUFFS INH Q4H PRN SHORTNESS OF       BREATH #1      Problems:        (1) History of Lyme disease      (2) Vestibular migraine      (3) Gout      (4) Vitamin D deficiency      (5) History of coronary artery disease      (6) Thickening of esophagus      (7) Hiatal hernia      (8) Hypothyroidism, unspecified      (9) Diabetes mellitus type 2, uncontrolled      Qualifiers:           Qualified Codes:  E11.65 - Type 2 diabetes mellitus with hyperglycemia            Plan      Patient's multiple abnormalities were discussed with him.      1.  Esophageal thickening-referred to Dr. Elizabeth who saw him in the past      2.  Large hiatal hernia      3.  Uncontrolled diabetes mellitus-glucose runs from 400-600.  Patient will see     an endocrinologist.      4.  Hypothyroidism with TSH less than 0.005-patient's dose needs to be decreased      5.  Elevated ferritin level patient to watch his iron intake especially iron     fortified  cereals.      6.  Lung nodules-patient needs repeat CAT scan in 3 to 6 months            Patient understood above problems and will discuss it with his family care     provider, Dr. Nicholson            Patient Education:        Anxiety Disorders            PREVENTION      Hx Influenza Vaccination:  Yes      Influenza Vaccine Declined:  No      2 or More Falls in Past Year?:  No      Fall Past Year with Injury?:  No      Chart initiated by      TOMÁS Alvarez MA            Electronically signed by Yelena Maguire  08/05/2020 19:02       Disclaimer: Converted document may not contain table formatting or lab diagrams. Please see [email or web address] for the authenticated document.

## 2021-05-28 NOTE — CONSULTS
Patient: LEEANNA HENAO     Acct: [id number]     Report: #[id number]  UNIT #: [id number]     : 1960    Encounter Date:2020  PRIMARY CARE:   ***Signed***  --------------------------------------------------------------------------------------------------------------------  Consult      DATE: 20      Primary Care Provider      LUIS ESQUIVEL            Referring Physician      Referring Provider:  SELF,REFERRED            Reason For Consult      NP Consult- Lump in Chest            History of Present Illness      60-year-old white male was referred because of a lump in his chest.  Patient     states that he wakes up with a lump in his chest.  Patient feels there is     something in his throat when he swallows water or any solid food.  After the     feeling of fullness disappears patient feels like it is hollow in his chest.              Patient claims that he has been short of air for the past 6 weeks to 2 months.              Has vestibular migraine which is relieved by topiramate.            Patient has a history of aortic aneurysm diagnosed last January as well as     presence of 2 nodules on the left upper lobe of his lung.            Patient has Lyme's disease.            Patient has hives continuously during the year.  Patient has been seen by a     local dermatologist and was diagnosed with urticaria and was given Zyrtec and     hydroxyzine.  His urticaria affects his lips, tongue, head.  It is pruritic.      Patient will be seeing an allergist shortly.            Review of his records revealed normal screening PSA, normal CBC.            A previous esophagogram done on him in 2017 showed patient had some     difficulty swallowing solids on the modified barium swallow in the AP projection    there appears to be some deviation of the esophagus towards the left and prefe    rential flow of the pharyngeal bolus towards the left.  There is no obvious     narrowing in the esophagus  direct visualization is recommended or consider     follow-up CT evaluation to ensure no lesions present in the neck or chest.      Postoperative changes from previous hiatal hernia repair.  There is just some     deviation of the gastroesophageal junction likely related to previous surgery.      The esophagus is mildly tortuous.  No other definite lesion is seen            A modified barium swallow showed no evidence for aspiration.  Thicker     consistencies including pudding and solid demonstrated significant residual this    consistencies the difficulty clearing the vallecula and pharynx despite multiple    swallows and chin tuck and liquid wash.  There is some irregularity of the     epiglottis and preferential bolus flow towards the left.            According to the patient he had esophagoscopy performed by Dr. Torres and it     was negative.            He had a CAT scan of the abdomen and pelvis which showed moderate-sized hiatal     hernia unchanged.  Fatty infiltration of the liver.  Abdominal aorta is mildly     ectatic.  2.5 cm hernia defects in the umbilical region.  Fat herniating through    the defect measures 2.5 cm in greatest dimension.            Past Medical/Surgical History             Hypertension             Diabetes Mellitus             Heart Disease (2 Heart attacks)             Blood Clots             No Cancer             No Lung Disease             No Kidney Disease             Other (Neuropathy, vestibular migraine, Lyme's disease, hypothyroidism, gout)            Pyschiatric History      Depression, Anxiety, Bipolar            Social History      Social History:  Tobacco Use (Former smoker quit 1999), Alcohol Use (6 packs of     beer per week); No Recreational Drug use            Family History      Hypertension (Mother), Diabetes Mellitus (Mother, Father, Brother); No Heart     Disease, No Blood Clots; Cancer (Maternal Grandparents, Maternal Uncle, Brother-    Leukemia)             Allergies      Coded Allergies:             BACITRACIN (Verified  Allergy, Severe, RASH AND SWELLING, SORES, 7/25/18)           PENICILLINS (Verified  Allergy, Severe, RESP. DIFFICULTY, SWELLING,     7/25/18)            Medications      Medications    Last Reconciled on 7/23/20 20:19 by RADHA TELLEZ MD      hydrOXYzine HCL (hydrOXYzine HCL) 50 Mg Tablet      50 MG PO QID, #120 TAB 0 Refills         Reported         7/22/20       Doxycycline Hyclate (Doxycycline Hyclate*) 100 Mg Capsule      100 MG PO BID, #14 CAP 0 Refills         Reported         7/22/20       Biotin (Biotin) 2,500 Mcg Cap      1000 MCG PO QDAY, #30 CAP         Reported         7/22/20       Turmeric Root Extract (Turmeric) 1 Each Capsule      500 MG PO TID, CAP         Reported         7/22/20       (Larginine)   No Conflict Check      1000 MG PO QDAY         Reported         7/22/20       Flaxseed Oil (Flax Seed Oil) 1,000 Mg Capsule      1000 MG PO QDAY, CAP         Reported         7/22/20       Dulaglutide (Trulicity) 1.5 Mg/0.5 Ml Pen.injctr      1.5 MG SUBQ Q7DAY, #1 PEN.INJCTR         Reported         7/22/20       Insulin Human Nph (novoLIN N VIAL) 100 Unit/1 Ml Vial      5 UNITS SUBQ sliding scale, #1 VIAL 0 Refills         Reported         7/22/20       Topiramate (Topamax*) 25 Mg Tablet      25 MG PO BID, TAB         Reported         7/22/20       metFORMIN ER (metFORMIN ER) 500 Mg Tab.er.24      500 MG PO BID, #30 TAB.ER 0 Refills         Reported         7/22/20       Cholecalciferol (Vitamin D3) (Vitamin D3) 2,000 Unit Capsule      2000 UNITS PO QDAY for 30 Days, #30 CAP         Reported         7/22/20       Levothyroxine (Levothyroxine) 0.2 Mg Tablet      0.2 MG PO QDAY@07, #30 TAB 0 Refills         Reported         7/22/20       Docusate Sodium (Docusate Sodium) 100 Mg Capsule      100 MG PO BID, #60 CAP         Prov: JOSEFINA CANTU         7/31/18       Linaclotide (Linzess) 145 Mcg Capsule      145 MCG PO QDAY, CAP          Reported         4/23/18       Atorvastatin (Atorvastatin) 40 Mg Tablet      40 MG PO HS, #30 TAB 0 Refills         Reported         4/23/18       Carvedilol (Carvedilol) 3.125 Mg Tablet      3.125 MG PO BID, #60 TAB 0 Refills         Reported         4/23/18       Meloxicam (Meloxicam*) 7.5 Mg Tablet      7.5 MG PO HS, #30 TAB 0 Refills         Reported         4/23/18       Spironolactone (Spironolactone*) 25 Mg Tablet      25 MG PO QAM, #60 TAB 0 Refills         Reported         9/1/15       Allopurinol (Allopurinol) 300 Mg Tablet      300 MG PO QDAY, #30 TAB 0 Refills         Reported         9/1/15       Aspirin EC (Aspirin EC) 81 Mg Tablet.dr      81 MG PO QDAY, TAB         Reported         8/6/13       Tamsulosin HCL (Flomax) 0.4 Mg Cap.sr.24h      0.4 MG PO HS         Reported         4/21/13      Current Medications      Current Medications Reviewed 7/22/20            Pain Assessment      Pain Intensity:  0      Description:  None            Review of Systems      Abnormal as noted below; all other systems have been reviewed and are negative.      General:  Anxiety, Fatigue Scale: (0), Pain Scale: (0)      HEENT:  Dysphagia; No Hearing Changes      Respiratory:  No Cough; Shortness of Air      Cardiovascular:  No Chest Pain; Pedal Edema, Other (Postural hypotension)      Gastrointestinal:  Nausea; No Vomiting; Dysphagia, Constipation, Appetite Fair     (Fair), Other (Weight gain)      Genitourinary:  No Nocturia, No Dysuria; Other (Feels that he does not urinate     enough)      Musculoskeletal:  No Joint Effusions      Endocrine:  No Heat Intolerance, No Cold Intolerance      Hematologic:  No Bleeding      Allergic/Immunologic:  Hives; No Throat closing off      Psychological:  No Anxiety, No Depression      Neurological:  Headaches, Dizziness, Weakness, Numbness (Feet, legs)      Skin:  No Rash, No Open Wounds; Other (Recent tick bites)      Vitals:             Height 6 ft 2 in / 187.96 cm            Weight 222 lbs 6.4 oz / 100.58155 kg           BSA 2.27 m2           BMI 28.6 kg/m2           Temperature 97.5 F / 36.39 C           Pulse 97           Respirations 16           Blood Pressure 126/84           Pulse Oximetry 97%            Exam      Constitutional:  No acute distress, Conversant, Pleasant      Eyes:  Anicteric sclerae, Palpebral Conjunctivae, ENRRIQUE      Neck:  Supple, Full Range of Motion      Lungs:  Clear to Ausculation, Normal Respiratory Effort      Cardiovascular:  RRR; No Murmurs; Normal PMI; No Peripheral Edema      Abdomen:  Soft, NABS; No Masses, No Tenderness      Skin:  Normal temperature, Normal tone, Other (No dermatosis)      Extremities:  No digital cyanosis, No digital ischemia, Normal gait, Other (No     deformity)      Psychiatric:  Appropriate affect, Intact judgement, AAO x 3      Neurological:  Cranial Nerve II-XII (Intact); No Focal Sensory deficits      Lymphatic:  No Neck; Axillae (Fullness on the right axilla)            Lab Results                                                                      Laboratory Tests      7/23/20 11:40            Laboratory Tests            Test       7/23/20      11:40             Granulocytes (%)       71.2 %      (30.0-85.0)             Mean Corpuscular Hemoglobin       29.5 pg      (27.0-31.0)             Mean Corpuscular Hemoglobin      Concent 33.8      (33.0-37.0)             Mean Corpuscular Volume       87.4 fL      (80.0-96.0)            Laboratory Tests            Test       7/23/20      11:40             White Blood Count       7.00 10*3/uL      (4.80-10.80)             Red Blood Count       4.84 10*6/uL      (4.70-6.10)             Hemoglobin       14.3 g/dL      (14.0-18.0)             Hematocrit       42.3 %      (42.0-52.0)             Mean Corpuscular Volume       87.4 fL      (80.0-96.0)             Mean Corpuscular Hemoglobin       29.5 pg      (27.0-31.0)             Mean Corpuscular Hemoglobin      Concent 33.8       (33.0-37.0)             Red Cell Distribution Width       11.6 %      (11.6-14.4)             RDW Standard Deviation       37.4 fL      (35.1-43.9)             Platelet Count       162 10*3/uL      (130-400)             Mean Platelet Volume       9.9 fL      (9.4-12.4)             Granulocytes (%)       71.2 %      (30.0-85.0)             Granulocytes #       4.99 10*3/uL      (2.00-8.00)             Lymphocytes # (Auto)       1.53 10*3/uL      (1.00-5.00)             Monocytes # (Auto)       0.27 10*3/uL      (0.20-1.20)             Eosinophils # (Auto)       0.18 10*3/uL      (0.00-0.70)             Basophils # (Auto)       0.01 10*3/uL      (0.00-0.20)             Immature Granulocytes %       0.3 %      (0.0-1.8)             Lymphocytes %       21.9 %      (20.0-45.0)             Monocytes %       3.9 %      (3.0-10.0)             Eosinophils %       2.6 %      (0.0-7.0)             Basophils %       0.1 %      (0.0-3.0)             Nucleated Red Blood Cells %       0.00 %      (0.00-0.70)             Immature Granulocytes #       0.02 10*3/uL      (0.00-0.20)             Erythrocyte Sedimentation Rate 13 mm/h (0-20)              Sodium Level       135 mmol/L      (135-147)             Potassium Level       4.6 mmol/L      (3.5-5.3)             Chloride Level       99 mmol/L      ()             Carbon Dioxide Level       25 mmol/L      (22-32)             Anion Gap       16 mmol/L      (8-19)             Blood Urea Nitrogen       22 mg/dL      (5-25)             Creatinine       0.98 mg/dL      (0.70-1.20)             Glomerular Filtration Rate      Calc >60      mL/min/{1.73_m2}             BUN/Creatinine Ratio       22 {ratio}      (6-20)             Glucose Level       464 mg/dL      (70-99)             Serum Osmolality 304 (273-304)              Calcium Level       9.6 mg/dL      (8.7-10.4)             Iron Level       75 ug/dL      ()             Total Iron Binding Capacity       252 ug/dL       (245-450)             Percent Iron Saturation 30 % (20-55)              Transferrin       176.00 mg/dL      (215..00)             Ferritin       468 ng/mL      ()             Total Bilirubin       0.31 mg/dL      (0.20-1.30)             Aspartate Amino Transf      (AST/SGOT) 19 U/L (15-50)                    Alanine Aminotransferase      (ALT/SGPT) 27 U/L (10-40)                    Alkaline Phosphatase       135 U/L      ()             Lactate Dehydrogenase       170 U/L      (120-240)             Total Protein       6.8 g/dL      (6.3-8.2)             Albumin       4.1 g/dL      (3.5-5.0)             Globulin       2.7 g/dL      (2.0-3.5)             Albumin/Globulin Ratio       1.5 {ratio}      (1.4-2.6)             Thyroid Stimulating Hormone      (TSH) <0.005 m(iU)/L      (0.270-4.200)             Free Thyroxine       2.0 ng/dL      (0.9-1.8)            Laboratory Tests            Test       7/23/20      11:40             White Blood Count       7.00 10*3/uL      (4.80-10.80)             Red Blood Count       4.84 10*6/uL      (4.70-6.10)             Hemoglobin       14.3 g/dL      (14.0-18.0)             Hematocrit       42.3 %      (42.0-52.0)             Mean Corpuscular Volume       87.4 fL      (80.0-96.0)             Mean Corpuscular Hemoglobin       29.5 pg      (27.0-31.0)             Mean Corpuscular Hemoglobin      Concent 33.8      (33.0-37.0)             Red Cell Distribution Width       11.6 %      (11.6-14.4)             RDW Standard Deviation       37.4 fL      (35.1-43.9)             Platelet Count       162 10*3/uL      (130-400)             Mean Platelet Volume       9.9 fL      (9.4-12.4)             Granulocytes (%)       71.2 %      (30.0-85.0)             Granulocytes #       4.99 10*3/uL      (2.00-8.00)             Lymphocytes # (Auto)       1.53 10*3/uL      (1.00-5.00)             Monocytes # (Auto)       0.27 10*3/uL      (0.20-1.20)             Eosinophils # (Auto)        0.18 10*3/uL      (0.00-0.70)             Basophils # (Auto)       0.01 10*3/uL      (0.00-0.20)             Immature Granulocytes %       0.3 %      (0.0-1.8)             Lymphocytes %       21.9 %      (20.0-45.0)             Monocytes %       3.9 %      (3.0-10.0)             Eosinophils %       2.6 %      (0.0-7.0)             Basophils %       0.1 %      (0.0-3.0)             Nucleated Red Blood Cells %       0.00 %      (0.00-0.70)             Immature Granulocytes #       0.02 10*3/uL      (0.00-0.20)             Erythrocyte Sedimentation Rate 13 mm/h (0-20)              Sodium Level       135 mmol/L      (135-147)             Potassium Level       4.6 mmol/L      (3.5-5.3)             Chloride Level       99 mmol/L      ()             Carbon Dioxide Level       25 mmol/L      (22-32)             Anion Gap       16 mmol/L      (8-19)             Blood Urea Nitrogen       22 mg/dL      (5-25)             Creatinine       0.98 mg/dL      (0.70-1.20)             Glomerular Filtration Rate      Calc >60      mL/min/{1.73_m2}             BUN/Creatinine Ratio       22 {ratio}      (6-20)             Glucose Level       464 mg/dL      (70-99)             Serum Osmolality 304 (273-304)              Calcium Level       9.6 mg/dL      (8.7-10.4)             Iron Level       75 ug/dL      ()             Total Iron Binding Capacity       252 ug/dL      (245-450)             Percent Iron Saturation 30 % (20-55)              Transferrin       176.00 mg/dL      (215..00)             Ferritin       468 ng/mL      ()             Total Bilirubin       0.31 mg/dL      (0.20-1.30)             Aspartate Amino Transf      (AST/SGOT) 19 U/L (15-50)                    Alanine Aminotransferase      (ALT/SGPT) 27 U/L (10-40)                    Alkaline Phosphatase       135 U/L      ()             Lactate Dehydrogenase       170 U/L      (120-240)             Total Protein       6.8 g/dL       (6.3-8.2)             Albumin       4.1 g/dL      (3.5-5.0)             Globulin       2.7 g/dL      (2.0-3.5)             Albumin/Globulin Ratio       1.5 {ratio}      (1.4-2.6)             Thyroid Stimulating Hormone      (TSH) <0.005 m(iU)/L      (0.270-4.200)             Free Thyroxine       2.0 ng/dL      (0.9-1.8)            Impression/Problem List      Dysphagia etiology to be determined -  there was some evidence of difficulty in     clearing the vallecula and pharynx despite multiple swallows and chin tuck and     liquid wash predisposing to significant residual on previous examination.  There     is also irregularity with epiglottis and preferential bolus flow towards the     left on the previous examination.      Urticaria      Eosinophilia probably allergic in etiology      Diabetes mellitus,      History of coronary disease      Hyperlipidemia      Hypothyroidism      History of Lyme's disease      Hypertension            Plan      The following tests are obtained: CT of the neck      CT of the chest for the history of lung nodules      Ultrasound right axilla      Obtain CBC, CMP, sed rate      IgE, C3, C4, CH 50      SHERMAN      Iron profile, ferritin      T4 TSH      Patient has an allergy consultation      Thank you very much for allowing me to participate in the care of your patient.      I will keep you posted on the progress of their workup.            Patient Education:        Diabetes, General (Alternative Therapy)            PREVENTION      Hx Influenza Vaccination:  Yes      Influenza Vaccine Declined:  No      2 or More Falls in Past Year?:  No      Fall Past Year with Injury?:  No      Chart initiated by      TOMÁS Alvarez MA            Electronically signed by Yelena Maguire  07/23/2020 20:19       Disclaimer: Converted document may not contain table formatting or lab diagrams. Please see [email or web address] for the authenticated document.

## 2021-06-05 NOTE — PROGRESS NOTES
Progress Note      Patient Name: Narciso Mejía   Patient ID: [id number]   Sex: Male   YOB: 1960    Primary Care Provider: Marco Nicholson MD   Referring Provider: Marco Nicholson MD    Visit Date: May 27, 2021    Provider: LOIS Hart   Location: [facility] [address]   Location Address: [address] [place] [address]  [id number]   Location Phone: [phone]          Chief Complaint  · Consult EGD  · Consult Colon      History Of Present Illness  Narciso Mejía is a 61 year old /White male who presents to the office today.      We have been seeing pt intermittently since 2015. He has a hx of rectal pain, dysphagia, elev alk phos w neg. AMA 2017, neg GGT 2017.   Last EGD 2015 - + large HH, otherwise neg.  Last Colonoscopy 2018: diffuse melanosis--bx c/w melanosis coli, 5 mm adenomatous polyp removed from sigmoid. +int. hemorrhoids    Pt last seen August 2020, reported DR Maguire ordered CT chest d/t dysphagia, showed thickening of esophagus and large HH. CT abd also done 1/2020. Hepatic w/u ordered for fatty liver noted on CT and EGD for sx.    Pt states he is still having dysphagia, pt states he went for EGD last year but he had a verbal altercation with the nurse so he left before the procedure.  Pt states when he eats or drinks he feels it get stuck lower esophagus near stomach. Also has strangling sensation w drinking liquids. No abd pain w meals usually, +belching and gas after Trulicity--he is currently out of it d/t not being able to afford. c/o HB but reports Protonix dose increased and it has helped.   No diarrhea,  constipation -- taking Linzess 145 mcg/d and Miralax daily. No blood in stool.     Has not had COVID vaccine    HX MI, has followed with DR Min but hasn't seen in last year d/t COVID and difficulty w appts, states everything has been stable, pt has no new c/o.       Past Medical History  Anemia; Angina pectoris; Anxiety and depression; Arthritis;  Asthma; Bipolar disorder; Broken Bones; Bulging Disc; CAD (coronary artery disease); Deafness; Deviated nasal septum; DM (diabetes mellitus); Dysphagia; Erectile dysfunction; Forgetfulness; GERD (gastroesophageal reflux disease); Gout; Heart attack; Hemorrhoids; HLD (hyperlipidemia); HTN (hypertension); Hypoparathyroidism; Hypothyroidism; Leg pain; Limb Swelling; MI (myocardial infarction); Muscle cramps; Muscular dystrophy; Nasal obstruction; Neurologic disorder, unspecified; Night sweat; LYNNETTE (obstructive sleep apnea); Perineal pain in male; Rectal bleeding; Seasonal allergies; Sinus Trouble; Skin Disease/Psoriasis/eczema; Trouble swallowing; Ulcer; Vestibular migraine         Past Surgical History  Appendectomy; cardiac stents; Carpal Tunnel Release; Colonoscopy; EGD; Septoplasty with bilateral reduction of nasal turbinates; Tonsillectomy; umbilical hernia repair         Medication List  Name Date Started Instructions   allopurinol 300 mg oral tablet  take 1 tablet (300 mg) by oral route once daily   atorvastatin 40 mg oral tablet 10/16/2019 TAKE ONE TABLET BY MOUTH AT BEDTIME   carvedilol 3.125 mg oral tablet  take 1 tablet (3.125 mg) by oral route 2 times per day with food   Ecotrin Low Strength 81 mg oral tablet,delayed release (DR/EC)  take 1 tablet (81 mg) by oral route once daily   levothyroxine 200 mcg oral capsule  take 1 capsule (200 mcg) by oral route once daily   meloxicam 7.5 mg oral tablet  take 1 tablet (7.5 mg) by oral route once daily   metformin 500 mg oral tablet  take 1 tablet (500 mg) by oral route 2 times per day with morning and evening meals   Novolin 70/30 U-100 Insulin 100 unit/mL (70-30) subcutaneous suspension  inject by subcutaneous route as per insulin protocol   Pantoprazole Sodium 20 MG Oral Tablet Delayed Release 04/19/2021 Take 1 tablet by mouth once daily for 90 days   spironolactone 25 mg oral tablet 12/16/2019 take 1 tablet (25 mg) by oral route every other day   testosterone  "cypionate 200 mg/mL intramuscular oil  --    topiramate 25 mg oral tablet  take 1 tablet (25 mg) by oral route once daily   topiramate 25 mg oral capsule,sprinkle,ER 24hr  take 2 capsules (50 mg) by oral route once daily   Trulicity 1.5 mg/0.5 mL subcutaneous pen injector  inject 0.5 milliliter (1.5 mg) by subcutaneous route every 7 days in the abdomen, thigh, or upper arm rotating injection sites         Allergy List  bacitracin; PENICILLINS       Allergies Reconciled  Family Medical History  Stroke; Heart Disease; Cancer, Unspecified; Renal Calculus; Family history of colon cancer; Diabetes; Family history of certain chronic disabling diseases; arthritis; Family history of Arthritis; Family history of stroke; Family history of heart disease; Family history of diabetes mellitus         Social History  Alcohol (Never); Disabled; ; Recreational Drug Use (Never); Retired; Student; Tobacco (Former)         Review of Systems  · Constitutional  o Admits  o : good general health lately, no acute distress  · Respiratory  o Denies  o : shortness of breath  · Gastrointestinal  o Denies  o : additional gastrointestinal symptoms except as noted in the HPI  · Psychiatric  o Admits  o : pleasant affect      Vitals  Date Time BP Position Site L\R Cuff Size HR RR TEMP (F) WT  HT  BMI kg/m2 BSA m2 O2 Sat FR L/min FiO2 HC       08/12/2020 11:17 /79 Sitting    75 - R  97.8 221lbs 4oz 6'  2\" 28.41 2.29       05/27/2021 03:37 /74 Sitting    87 - R  98.2 241lbs 0oz 6'  2\" 30.94 2.39             Physical Examination  · Constitutional  o Appearance  o : well developed, well-nourished, in no acute distress  · Head and Face  o Head  o :   § Inspection  § : atraumatic, normocephalic  · Eyes  o Sclerae  o : sclerae white, no sclerae icterus  · Neck  o Inspection/Palpation  o : supple  · Respiratory  o Respiratory Effort  o : breathing unlabored  o Inspection of Chest  o : normal appearance, no " retractions  · Cardiovascular  o Peripheral Vascular System  o :   § Extremities  § : no cyanosis, clubbing or edema  · Gastrointestinal  o Abdominal Examination  o : soft, nontender to palpation  · Skin and Subcutaneous Tissue  o General Inspection  o : no lesions present, no rashes present  · Neurologic  o Mental Status Examination  o :   § Orientation  § : grossly oriented to person, place and time  § Speech/Language  § : communication ability within normal limits, voice quality normal, articulation of speech normal, no evidence of aphasia  § Attention  § : attention normal, concentration abilities normal  o Sensation  o : grossly intact  o Gait and Station  o :   § Gait Screening  § : normal gait  · Psychiatric  o General  o : Alert and oriented x3  o Mood and Affect  o : Mood and affect are appropriate to circumstances          Assessment  · Pre-op exam     V72.84/Z01.818  · Constipation     564.00/K59.00  · Esophageal dysphagia     787.20/R13.10  · Fatty liver     571.8/K76.0  · Hiatal hernia     553.3/K44.9      Plan  · Orders  o Acute hepatitis panel (HAV IgM, HbcAb IgM, HbsAg, HCV) (86744, 10295, 48852, 58837, 43220) - - 05/27/2021  o Liver Panel (15323) - - 05/27/2021  o PT/INR (03536) - - 05/27/2021  o [facility] Pre-Op Covid-19 Screening (19125) - - 05/27/2021  · Medications  o Medications have been Reconciled  o Transition of Care or Provider Policy  · Instructions  o Please Sign Permit for: EGD  o Indication: dysphagia, hx abnormal CT 2020 of esophagus  o Surgical Risk and Benefits: Possible risks/complications, benefits, and alternatives to surgical or invasive procedure have been explained to patient and/or legal guardian; Patient has been evaluated and can tolerate anesthesia and/or sedation. Risks, benefits, and alternatives to anesthesia and sedation have been explained to patient and/or legal guardian.  o Follow Up after Procedure.  o Encouraged to follow-up with Primary Care Provider for preventative  care.  o Patient was educated/instructed on their diagnosis, treatment and medications prior to discharge from the clinic today.  o Patient instructed to seek medical attention urgently for new or worsening symptoms.  o Increasing Linzess to 290 mcg/d--will have to do from facesheet and samples given today.             Electronically Signed by: LOIS Hart -Author on May 27, 2021 05:35:30 PM

## 2021-06-10 ENCOUNTER — TELEPHONE (OUTPATIENT)
Dept: GASTROENTEROLOGY | Facility: CLINIC | Age: 61
End: 2021-06-10

## 2021-06-10 NOTE — TELEPHONE ENCOUNTER
Patient needs an order for an EGD so he an be scheduled. I will also send cardio clearance as pts appt is scheduled for 7.12.21. He will be scheduled for after that. Saw pt 5.27.21 when we were having computer issues.

## 2021-06-14 ENCOUNTER — PREP FOR SURGERY (OUTPATIENT)
Dept: OTHER | Facility: HOSPITAL | Age: 61
End: 2021-06-14

## 2021-06-14 DIAGNOSIS — R93.3 ABNORMAL CT SCAN, ESOPHAGUS: ICD-10-CM

## 2021-06-14 DIAGNOSIS — R13.19 ESOPHAGEAL DYSPHAGIA: Primary | ICD-10-CM

## 2021-06-14 NOTE — TELEPHONE ENCOUNTER
Pt has been scheduled for July 20, 2021. Mailed pt appt and Covid screening appt. Cardio clearance will be faxed next month close to pts appt with Dr. Min.

## 2021-07-02 PROBLEM — I25.10 CORONARY ARTERY DISEASE INVOLVING NATIVE CORONARY ARTERY OF NATIVE HEART WITHOUT ANGINA PECTORIS: Status: ACTIVE | Noted: 2021-07-02

## 2021-07-02 PROBLEM — I50.42 CHRONIC COMBINED SYSTOLIC AND DIASTOLIC CONGESTIVE HEART FAILURE: Status: ACTIVE | Noted: 2021-07-02

## 2021-07-02 PROBLEM — E78.5 HYPERLIPIDEMIA LDL GOAL <70: Status: ACTIVE | Noted: 2021-07-02

## 2021-07-15 VITALS
HEART RATE: 87 BPM | HEIGHT: 74 IN | DIASTOLIC BLOOD PRESSURE: 74 MMHG | WEIGHT: 241 LBS | BODY MASS INDEX: 30.93 KG/M2 | SYSTOLIC BLOOD PRESSURE: 113 MMHG | TEMPERATURE: 98.2 F

## 2021-09-22 ENCOUNTER — TRANSCRIBE ORDERS (OUTPATIENT)
Dept: ADMINISTRATIVE | Facility: HOSPITAL | Age: 61
End: 2021-09-22

## 2021-09-22 ENCOUNTER — HOSPITAL ENCOUNTER (OUTPATIENT)
Dept: GENERAL RADIOLOGY | Facility: HOSPITAL | Age: 61
Discharge: HOME | End: 2021-09-22
Admitting: FAMILY MEDICINE

## 2021-09-22 DIAGNOSIS — R91.8 MULTIPLE NODULES OF LUNG: ICD-10-CM

## 2021-09-22 DIAGNOSIS — R91.8 MULTIPLE NODULES OF LUNG: Primary | ICD-10-CM

## 2021-09-22 PROCEDURE — 71046 X-RAY EXAM CHEST 2 VIEWS: CPT

## 2021-12-16 ENCOUNTER — TRANSCRIBE ORDERS (OUTPATIENT)
Dept: ADMINISTRATIVE | Facility: HOSPITAL | Age: 61
End: 2021-12-16

## 2021-12-16 DIAGNOSIS — I73.9 PERIPHERAL VASCULAR DISEASE, UNSPECIFIED (HCC): Primary | ICD-10-CM

## 2021-12-27 ENCOUNTER — HOSPITAL ENCOUNTER (OUTPATIENT)
Dept: CARDIOLOGY | Facility: HOSPITAL | Age: 61
Discharge: HOME | End: 2021-12-27
Admitting: FAMILY MEDICINE

## 2021-12-27 DIAGNOSIS — I73.9 PERIPHERAL VASCULAR DISEASE, UNSPECIFIED (HCC): ICD-10-CM

## 2021-12-27 LAB
BH CV LOWER ARTERIAL LEFT ABI RATIO: 1.22
BH CV LOWER ARTERIAL LEFT DORSALIS PEDIS SYS MAX: 157 MMHG
BH CV LOWER ARTERIAL LEFT GREAT TOE SYS MAX: 120 MMHG
BH CV LOWER ARTERIAL LEFT POST TIBIAL SYS MAX: 158 MMHG
BH CV LOWER ARTERIAL LEFT TBI RATIO: 0.92
BH CV LOWER ARTERIAL RIGHT ABI RATIO: 1.19
BH CV LOWER ARTERIAL RIGHT DORSALIS PEDIS SYS MAX: 150 MMHG
BH CV LOWER ARTERIAL RIGHT GREAT TOE SYS MAX: 143 MMHG
BH CV LOWER ARTERIAL RIGHT POST TIBIAL SYS MAX: 155 MMHG
BH CV LOWER ARTERIAL RIGHT TBI RATIO: 1.1
MAXIMAL PREDICTED HEART RATE: 159 BPM
STRESS TARGET HR: 135 BPM
UPPER ARTERIAL LEFT ARM BRACHIAL SYS MAX: 123 MMHG
UPPER ARTERIAL RIGHT ARM BRACHIAL SYS MAX: 130 MMHG

## 2021-12-27 PROCEDURE — 93922 UPR/L XTREMITY ART 2 LEVELS: CPT | Performed by: SURGERY

## 2021-12-27 PROCEDURE — 93922 UPR/L XTREMITY ART 2 LEVELS: CPT

## 2022-02-17 ENCOUNTER — TELEPHONE (OUTPATIENT)
Dept: GASTROENTEROLOGY | Facility: CLINIC | Age: 62
End: 2022-02-17

## 2022-02-17 NOTE — TELEPHONE ENCOUNTER
"I called pt to f/u with him because he had n/s'd an EGD in July 2021. Pt states he cx'd it due to Covid and was not interested in rescheduling. I offered patient an office visit but he declined, stating,  \"my schedule will never line up with your doctors. I'm not going to stop my whole world and schedule to fit their schedule. Doctors are not God.\"    I explained to pt that he has a hx of abnormal CT scan and an EGD would be the best test to evaluate the abnormality and rule out any type of disease or cancer. Pt verbalized understanding but still refused to schedule appt or r/s EGD. I explained to patient that he could call me at any time if he changes his mind and I would try to work with his schedule the best we could. Pt thanked me and said he would call back if he decided to go forward with procedure.  "

## 2022-08-22 ENCOUNTER — OFFICE VISIT (OUTPATIENT)
Dept: CARDIOLOGY | Facility: CLINIC | Age: 62
End: 2022-08-22

## 2022-08-22 VITALS
WEIGHT: 204 LBS | DIASTOLIC BLOOD PRESSURE: 76 MMHG | BODY MASS INDEX: 26.18 KG/M2 | SYSTOLIC BLOOD PRESSURE: 122 MMHG | HEIGHT: 74 IN | HEART RATE: 67 BPM | OXYGEN SATURATION: 99 %

## 2022-08-22 DIAGNOSIS — I25.118 CORONARY ARTERY DISEASE OF NATIVE ARTERY OF NATIVE HEART WITH STABLE ANGINA PECTORIS: Primary | ICD-10-CM

## 2022-08-22 DIAGNOSIS — I73.9 PVD (PERIPHERAL VASCULAR DISEASE) WITH CLAUDICATION: ICD-10-CM

## 2022-08-22 DIAGNOSIS — M79.10 MYALGIA: ICD-10-CM

## 2022-08-22 DIAGNOSIS — I10 BENIGN ESSENTIAL HTN: ICD-10-CM

## 2022-08-22 DIAGNOSIS — E78.5 HYPERLIPIDEMIA LDL GOAL <70: ICD-10-CM

## 2022-08-22 PROCEDURE — 99215 OFFICE O/P EST HI 40 MIN: CPT | Performed by: INTERNAL MEDICINE

## 2022-08-22 PROCEDURE — 93000 ELECTROCARDIOGRAM COMPLETE: CPT | Performed by: INTERNAL MEDICINE

## 2022-08-22 RX ORDER — CARVEDILOL 3.12 MG/1
3.12 TABLET ORAL 2 TIMES DAILY WITH MEALS
COMMUNITY
End: 2022-08-22 | Stop reason: SDUPTHER

## 2022-08-22 RX ORDER — GLIPIZIDE 10 MG/1
10 TABLET ORAL
COMMUNITY
End: 2023-02-10

## 2022-08-22 RX ORDER — LISINOPRIL 2.5 MG/1
2.5 TABLET ORAL DAILY
COMMUNITY
End: 2022-10-03

## 2022-08-22 RX ORDER — CETIRIZINE HYDROCHLORIDE 10 MG/1
10 TABLET ORAL DAILY
COMMUNITY

## 2022-08-22 RX ORDER — CARVEDILOL 6.25 MG/1
6.25 TABLET ORAL 2 TIMES DAILY WITH MEALS
Qty: 180 TABLET | Refills: 3 | Status: SHIPPED | OUTPATIENT
Start: 2022-08-22

## 2022-08-22 RX ORDER — DOXAZOSIN MESYLATE 4 MG/1
4 TABLET ORAL NIGHTLY
COMMUNITY

## 2022-08-22 RX ORDER — FAMOTIDINE 20 MG/1
20 TABLET, FILM COATED ORAL 2 TIMES DAILY
COMMUNITY

## 2022-08-22 RX ORDER — PANTOPRAZOLE SODIUM 40 MG/1
40 TABLET, DELAYED RELEASE ORAL DAILY
COMMUNITY

## 2022-08-22 NOTE — PROGRESS NOTES
Office Visit    Chief Complaint  Coronary Artery Disease, Hyperlipidemia, and Congestive Heart Failure    Subjective            Narciso Mejía presents to [facility]  Mr. Mejía is a 62 years old gentleman who has not been seen in the office for almost 3 years.  He has a prior diagnosis of coronary disease, status post anterior wall myocardial infarction and status post stent PCI to the LAD.  He has been complaining of leg weakness and pain in his both legs when he walks.  He says getting progressively worse over time.  He can hardly walk 50 m and is severe muscle aches and pain in his legs and he has to stop.  He also complains of being tired and fatigued all the time with chest pain with exertion.  He denies palpitations dizziness or syncope      Past Medical History:   Diagnosis Date   • Anemia    • Angina pectoris (Formerly [facility])    • Anxiety and depression    • Arthritis 2000   • Asthma    • Bipolar disorder (Formerly [facility]) 2008   • Broken bones    • Bronchitis    • Bulging lumbar disc    • CAD (coronary artery disease)    • Crohn's colitis (Formerly [facility])    • Deafness 2010   • Deviated nasal septum    • Diabetes mellitus (Formerly [facility])    • Disease of thyroid gland    • Dysphagia    • Erectile dysfunction 04/23/2018   • Forgetfulness 2008   • GERD (gastroesophageal reflux disease)    • Gout 2007   • H/O ulceration     Ulcer   • Heart attack (Formerly [facility])    • Hemorrhoids 2000   • Hiatal hernia    • HLD (hyperlipidemia)     High Cholesterol   • Hypertension    • Hypoparathyroidism (Formerly [facility])    • Hypothyroidism    • Leg pain    • Limb swelling    • Liver disease    • Muscle cramps    • Muscular dystrophy (Formerly [facility])    • Myocardial infarction (Formerly [facility])     x 2   • Nasal obstruction    • Neurologic disorder     Unspecified   • Night sweat    • LYNNETTE (obstructive sleep apnea)    • Peptic ulceration    • Perineal pain in male 05/24/2018   • 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Formerly [facility])    • Pneumonia    • Rectal bleeding    • Seasonal allergies    • Sinus trouble     • Skin disease 2000    Skin Disease/Psoriasis/eczema   • Testosterone deficiency    • Testosterone deficiency in male    • Trouble swallowing    • Vestibular migraine    • Vitamin D deficiency        Allergies   Allergen Reactions   • Bacitracin-Neomycin-Polymyxin Hives     Hives and blisters   • Bydureon [Exenatide] Hives   • Penicillins Swelling        Past Surgical History:   Procedure Laterality Date   • APPENDECTOMY     • CARPAL TUNNEL RELEASE     • COLONOSCOPY     • CORONARY ANGIOPLASTY WITH STENT PLACEMENT     • ENDOSCOPY     • ESOPHAGUS SURGERY     • HERNIA REPAIR      Umbilical   • NASAL SEPTUM SURGERY  2018    Dr. Pearl - Septoplasty with bilateral reduction of nasal turbinates   • POLYPECTOMY     • TONSILECTOMY, ADENOIDECTOMY, BILATERAL MYRINGOTOMY AND TUBES          Social History     Tobacco Use   • Smoking status: Former Smoker     Packs/day: 2.00     Years: 24.00     Pack years: 48.00     Types: Cigarettes     Quit date:      Years since quittin.6   • Smokeless tobacco: Never Used   • Tobacco comment: 20 years ago   Substance Use Topics   • Alcohol use: No   • Drug use: No       Family History   Problem Relation Age of Onset   • Stroke Mother    • Heart disease Mother    • Diabetes Mother         Diabetes Mellitus   • Arthritis Mother    • Diabetes Father         Diabetes Mellitus   • Arthritis Father    • Heart disease Sister    • Diabetes Sister         Diabetes Mellitus   • Heart disease Brother    • Cancer Brother         Unspecified   • Diabetes Brother         Diabetes Mellitus   • Arthritis Brother    • Other Daughter         Renal Calculus   • Colon cancer Maternal Uncle         Prior to Admission medications    Medication Sig Start Date End Date Taking? Authorizing Provider   allopurinol (ZYLOPRIM) 300 MG tablet Take 1 tablet by mouth Daily. 19  Yes Susu Heck APRN   aspirin (aspirin) 81 MG EC tablet Take 1 tablet by mouth Daily. 6/23/15  Yes Provider,  MD Adriana   atorvastatin (LIPITOR) 40 MG tablet Take 1 tablet by mouth Daily. 1/14/19  Yes Susu Heck APRN   carvedilol (COREG) 3.125 MG tablet Take 3.125 mg by mouth 2 (Two) Times a Day With Meals.   Yes Adriana Lee MD   cetirizine (zyrTEC) 10 MG tablet Take 10 mg by mouth Daily.   Yes Adriana Lee MD   doxazosin (CARDURA) 4 MG tablet Take 4 mg by mouth Every Night.   Yes Adriana Lee MD   Dulaglutide 1.5 MG/0.5ML solution pen-injector Inject  under the skin into the appropriate area as directed.   Yes Adriana Lee MD   famotidine (PEPCID) 20 MG tablet Take 20 mg by mouth 2 (Two) Times a Day.   Yes Adriana Lee MD   glipizide (GLUCOTROL) 10 MG tablet Take 10 mg by mouth 2 (Two) Times a Day Before Meals.   Yes Adriana Lee MD   linaclotide (LINZESS) 145 MCG capsule capsule Take 1 capsule by mouth Every Morning Before Breakfast.  Patient taking differently: Take 290 mcg by mouth Every Morning Before Breakfast. 1/14/19  Yes Susu Heck APRN   lisinopril (PRINIVIL,ZESTRIL) 2.5 MG tablet Take 2.5 mg by mouth Daily.   Yes Adriana Lee MD   metFORMIN (GLUCOPHAGE) 1000 MG tablet Take 1,000 mg by mouth 2 (Two) Times a Day With Meals.   Yes Adriana Lee MD   ONETOUCH VERIO test strip Use to test BG 3 times daily. DX Code: E11.9 2/18/19  Yes uSsu Heck APRN   pantoprazole (PROTONIX) 40 MG EC tablet Take 40 mg by mouth Daily.   Yes ProviderAdriana MD   ranitidine (ZANTAC) 150 MG capsule Take 1 capsule by mouth Daily. 6/23/15  Yes Adriana Lee MD   spironolactone (ALDACTONE) 25 MG tablet Take 1 tablet by mouth Daily. 1/14/19  Yes Susu Heck APRN   tamsulosin (FLOMAX) 0.4 MG capsule 24 hr capsule Take 0.4 mg by mouth Daily. 6/23/15  Yes Adriana Lee MD   TRESIBA FLEXTOUCH 200 UNIT/ML solution pen-injector Inject 80 Units under the skin into the appropriate area as directed Daily. 1/14/19  Yes Susu Heck,  "LOIS   carvedilol (COREG) 12.5 MG tablet Take 1 tablet by mouth 2 (Two) Times a Day. 1/14/19   Susu Heck APRN   Empagliflozin (JARDIANCE PO) Take  by mouth.    Provider, MD Adriana   exenatide er (BYDUREON BCISE) 2 MG/0.85ML auto-injector injection Inject 0.85 mL under the skin into the appropriate area as directed 1 (One) Time Per Week. 4/16/19   Susu Heck APRN   Insulin Lispro (HUMALOG KWIKPEN) 100 UNIT/ML solution pen-injector 15 units prior to each meal 1/14/19   Susu Heck APRN   lisinopril (PRINIVIL,ZESTRIL) 10 MG tablet Take 1 tablet by mouth Daily. 1/14/19   Susu Heck APRN   meloxicam (MOBIC) 7.5 MG tablet Take 7.5 mg by mouth Daily. 6/23/15   Provider, MD Adriana   Testosterone Cypionate (DEPOTESTOTERONE CYPIONATE) 200 MG/ML injection 0.6 mg weekly 1/14/19   Susu Heck APRN   topiramate (TOPAMAX) 25 MG tablet Take 1 tablet by mouth every 12 hours. 1/15/20   Blaze Montgomery MD   XIGDUO XR 5-1000 MG tablet Take 2 tablets by mouth Daily. 1/14/19   Susu Heck APRN        Review of Systems   Constitutional: Negative for fatigue.   Respiratory: Positive for shortness of breath. Negative for cough.    Cardiovascular: Positive for chest pain. Negative for palpitations and leg swelling.   Neurological: Positive for weakness. Negative for dizziness.        Objective     /76   Pulse 67   Ht 188 cm (74\")   Wt 92.5 kg (204 lb)   SpO2 99%   BMI 26.19 kg/m²       Physical Exam  Constitutional:       General: He is awake.      Appearance: Normal appearance.   Neck:      Thyroid: No thyromegaly.      Vascular: No carotid bruit or JVD.   Cardiovascular:      Rate and Rhythm: Normal rate and regular rhythm.      Chest Wall: PMI is not displaced.      Pulses: Normal pulses.      Heart sounds: Normal heart sounds, S1 normal and S2 normal. No murmur heard.    No friction rub. No gallop. No S3 or S4 sounds.   Pulmonary:      Effort: Pulmonary effort is normal.      Breath " sounds: Normal breath sounds and air entry. No wheezing, rhonchi or rales.   Abdominal:      General: Bowel sounds are normal.      Palpations: Abdomen is soft. There is no mass.      Tenderness: There is no abdominal tenderness.   Musculoskeletal:      Cervical back: Neck supple.      Right lower leg: No edema.      Left lower leg: No edema.   Neurological:      Mental Status: He is alert and oriented to person, place, and time.   Psychiatric:         Mood and Affect: Mood normal.         Behavior: Behavior is cooperative.           Result Review :                    ECG 12 Lead    Date/Time: 8/22/2022 4:54 PM  Performed by: Olivia Min MD  Authorized by: Olivia Min MD   Comments: Sinus rhythm, low voltage extremity and precordial leads.  Probable anteroseptal MI age-indeterminate.  No change compared to EKG from 2021                Assessment and Plan        Diagnoses and all orders for this visit:    1. Coronary artery disease of native artery of native heart with stable angina pectoris (HCC) (Primary)  Assessment & Plan:  Mr. Mejía is a 62 years old gentleman with previous anterior wall myocardial infarction, s/p PCI who states that over the last 6 months he has had some degree of chest pain when he ambulates.  His legs hurt and then he sometimes will have chest pain.  It is mild to moderate in severity retro sternal in location and resolves with rest.  He denies palpitations dizziness or syncope.  I am going to increase his carvedilol to 6.25 twice daily and set him up for a stress test    Orders:  -     CK; Future  -     Comprehensive Metabolic Panel; Future  -     TSH; Future  -     T4, Free; Future  -     CT Angio Abdominal Aorta Bilateral Iliofem Runoff; Future  -     Adult Transthoracic Echo Complete W/ Cont if Necessary Per Protocol; Future  -     Stress Test With Myocardial Perfusion One Day; Future    2. Hyperlipidemia LDL goal <70  Assessment & Plan:  He is on Lipitor 40 mg for a long time for  his lipid control following his MI.  However he has developed leg weakness and muscle aches and I am going to stop the Lipitor for 2 weeks as a trial run to see what happens  With his myalgias    Orders:  -     CK; Future  -     Comprehensive Metabolic Panel; Future  -     TSH; Future  -     T4, Free; Future  -     CT Angio Abdominal Aorta Bilateral Iliofem Runoff; Future  -     Adult Transthoracic Echo Complete W/ Cont if Necessary Per Protocol; Future  -     Stress Test With Myocardial Perfusion One Day; Future    3. PVD (peripheral vascular disease) with claudication (HCC)  Assessment & Plan:  He does have pedal pulses that are weak.  He also needs follow-up on his abdominal aortic aneurysm.  I am going to set him up for a CT angio of his abdomen and runoff    Orders:  -     CK; Future  -     Comprehensive Metabolic Panel; Future  -     TSH; Future  -     T4, Free; Future  -     CT Angio Abdominal Aorta Bilateral Iliofem Runoff; Future  -     Adult Transthoracic Echo Complete W/ Cont if Necessary Per Protocol; Future  -     Stress Test With Myocardial Perfusion One Day; Future    4. Benign essential HTN  -     CK; Future  -     Comprehensive Metabolic Panel; Future  -     TSH; Future  -     T4, Free; Future  -     CT Angio Abdominal Aorta Bilateral Iliofem Runoff; Future  -     Adult Transthoracic Echo Complete W/ Cont if Necessary Per Protocol; Future  -     Stress Test With Myocardial Perfusion One Day; Future    5. Myalgia    Other orders  -     carvedilol (COREG) 6.25 MG tablet; Take 1 tablet by mouth 2 (Two) Times a Day With Meals.  Dispense: 180 tablet; Refill: 3        To have him stop his atorvastatin for 2 weeks to see if it helps with his myalgias.  In addition he needs to be investigated for abdominal aortic aneurysm and peripheral vascular disease so I am going to order a CT angiogram of his abdomen aorta and peripheral runoff.  He would also get a stress test and echocardiogram to assess the severity  of his LV dysfunction and coronary disease.    Total time spent 45 minutes including reviewing prior records speaking to the patient examining him and setting him up for all the tests and putting through this note.    Follow Up     No follow-ups on file.    Patient was given instructions and counseling regarding his condition or for health maintenance advice. Please see specific information pulled into the AVS if appropriate.     Olivia Min MD  08/22/22 11:55 EDT

## 2022-08-22 NOTE — ASSESSMENT & PLAN NOTE
He does have pedal pulses that are weak.  He also needs follow-up on his abdominal aortic aneurysm.  I am going to set him up for a CT angio of his abdomen and runoff

## 2022-08-22 NOTE — ASSESSMENT & PLAN NOTE
Mr. Mejía is a 62 years old gentleman with previous anterior wall myocardial infarction, s/p PCI who states that over the last 6 months he has had some degree of chest pain when he ambulates.  His legs hurt and then he sometimes will have chest pain.  It is mild to moderate in severity retro sternal in location and resolves with rest.  He denies palpitations dizziness or syncope.  I am going to increase his carvedilol to 6.25 twice daily and set him up for a stress test

## 2022-09-15 ENCOUNTER — LAB (OUTPATIENT)
Dept: LAB | Facility: HOSPITAL | Age: 62
End: 2022-09-15

## 2022-09-15 ENCOUNTER — TRANSCRIBE ORDERS (OUTPATIENT)
Dept: LAB | Facility: HOSPITAL | Age: 62
End: 2022-09-15

## 2022-09-15 DIAGNOSIS — E78.5 HYPERLIPIDEMIA LDL GOAL <70: ICD-10-CM

## 2022-09-15 DIAGNOSIS — J30.1 ALLERGIC RHINITIS DUE TO POLLEN, UNSPECIFIED SEASONALITY: ICD-10-CM

## 2022-09-15 DIAGNOSIS — J98.9 RESPIRATORY DISORDER: ICD-10-CM

## 2022-09-15 DIAGNOSIS — L20.89 OTHER ATOPIC DERMATITIS: ICD-10-CM

## 2022-09-15 DIAGNOSIS — J98.9 RESPIRATORY DISORDER: Primary | ICD-10-CM

## 2022-09-15 DIAGNOSIS — J30.89 OTHER ALLERGIC RHINITIS: ICD-10-CM

## 2022-09-15 DIAGNOSIS — K21.9 GASTROESOPHAGEAL REFLUX DISEASE WITHOUT ESOPHAGITIS: ICD-10-CM

## 2022-09-15 DIAGNOSIS — I25.118 CORONARY ARTERY DISEASE OF NATIVE ARTERY OF NATIVE HEART WITH STABLE ANGINA PECTORIS: ICD-10-CM

## 2022-09-15 DIAGNOSIS — G47.30 SLEEP APNEA, UNSPECIFIED TYPE: ICD-10-CM

## 2022-09-15 DIAGNOSIS — I10 BENIGN ESSENTIAL HTN: ICD-10-CM

## 2022-09-15 DIAGNOSIS — Z91.018 ALLERGY TO OTHER FOODS: ICD-10-CM

## 2022-09-15 DIAGNOSIS — I73.9 PVD (PERIPHERAL VASCULAR DISEASE) WITH CLAUDICATION: ICD-10-CM

## 2022-09-15 LAB
ALBUMIN SERPL-MCNC: 4.1 G/DL (ref 3.5–5.2)
ALBUMIN/GLOB SERPL: 2 G/DL
ALP SERPL-CCNC: 122 U/L (ref 39–117)
ALT SERPL W P-5'-P-CCNC: 17 U/L (ref 1–41)
ANION GAP SERPL CALCULATED.3IONS-SCNC: 11 MMOL/L (ref 5–15)
AST SERPL-CCNC: 19 U/L (ref 1–40)
BILIRUB SERPL-MCNC: 0.2 MG/DL (ref 0–1.2)
BUN SERPL-MCNC: 14 MG/DL (ref 8–23)
BUN/CREAT SERPL: 17.5 (ref 7–25)
CALCIUM SPEC-SCNC: 9.7 MG/DL (ref 8.6–10.5)
CHLORIDE SERPL-SCNC: 105 MMOL/L (ref 98–107)
CK SERPL-CCNC: 83 U/L (ref 20–200)
CO2 SERPL-SCNC: 23 MMOL/L (ref 22–29)
CREAT SERPL-MCNC: 0.8 MG/DL (ref 0.76–1.27)
EGFRCR SERPLBLD CKD-EPI 2021: 100.1 ML/MIN/1.73
GLOBULIN UR ELPH-MCNC: 2.1 GM/DL
GLUCOSE SERPL-MCNC: 325 MG/DL (ref 65–99)
POTASSIUM SERPL-SCNC: 4.3 MMOL/L (ref 3.5–5.2)
PROT SERPL-MCNC: 6.2 G/DL (ref 6–8.5)
SODIUM SERPL-SCNC: 139 MMOL/L (ref 136–145)
T4 FREE SERPL-MCNC: 0.95 NG/DL (ref 0.93–1.7)
TSH SERPL DL<=0.05 MIU/L-ACNC: 0.01 UIU/ML (ref 0.27–4.2)

## 2022-09-15 PROCEDURE — 84439 ASSAY OF FREE THYROXINE: CPT

## 2022-09-15 PROCEDURE — 82550 ASSAY OF CK (CPK): CPT

## 2022-09-15 PROCEDURE — 82785 ASSAY OF IGE: CPT

## 2022-09-15 PROCEDURE — 36415 COLL VENOUS BLD VENIPUNCTURE: CPT

## 2022-09-15 PROCEDURE — 86008 ALLG SPEC IGE RECOMB EA: CPT

## 2022-09-15 PROCEDURE — 84443 ASSAY THYROID STIM HORMONE: CPT

## 2022-09-15 PROCEDURE — 80053 COMPREHEN METABOLIC PANEL: CPT

## 2022-09-19 LAB — IGE SERPL-ACNC: 96.8 KU/L

## 2022-09-21 LAB — ALPHA-GAL IGE QN: 3.63 KU/L

## 2022-09-22 ENCOUNTER — HOSPITAL ENCOUNTER (OUTPATIENT)
Dept: NUCLEAR MEDICINE | Facility: HOSPITAL | Age: 62
End: 2022-09-22

## 2022-09-22 ENCOUNTER — HOSPITAL ENCOUNTER (OUTPATIENT)
Dept: NUCLEAR MEDICINE | Facility: HOSPITAL | Age: 62
Discharge: HOME | End: 2022-09-22

## 2022-09-22 ENCOUNTER — HOSPITAL ENCOUNTER (OUTPATIENT)
Dept: CT IMAGING | Facility: HOSPITAL | Age: 62
Discharge: HOME | End: 2022-09-22

## 2022-09-22 DIAGNOSIS — I10 BENIGN ESSENTIAL HTN: ICD-10-CM

## 2022-09-22 DIAGNOSIS — I25.118 CORONARY ARTERY DISEASE OF NATIVE ARTERY OF NATIVE HEART WITH STABLE ANGINA PECTORIS: ICD-10-CM

## 2022-09-22 DIAGNOSIS — I73.9 PVD (PERIPHERAL VASCULAR DISEASE) WITH CLAUDICATION: ICD-10-CM

## 2022-09-22 DIAGNOSIS — E78.5 HYPERLIPIDEMIA LDL GOAL <70: ICD-10-CM

## 2022-09-22 PROCEDURE — 0 IOPAMIDOL PER 1 ML: Performed by: INTERNAL MEDICINE

## 2022-09-22 PROCEDURE — 75635 CT ANGIO ABDOMINAL ARTERIES: CPT

## 2022-09-22 RX ADMIN — IOPAMIDOL 100 ML: 755 INJECTION, SOLUTION INTRAVENOUS at 10:42

## 2022-09-23 ENCOUNTER — TELEPHONE (OUTPATIENT)
Dept: CARDIOLOGY | Facility: CLINIC | Age: 62
End: 2022-09-23

## 2022-09-26 ENCOUNTER — HOSPITAL ENCOUNTER (OUTPATIENT)
Dept: NUCLEAR MEDICINE | Facility: HOSPITAL | Age: 62
Discharge: HOME | End: 2022-09-26

## 2022-09-26 LAB
BH CV IMMEDIATE POST RECOVERY TECH DATA SYMPTOMS: NORMAL
BH CV IMMEDIATE POST TECH DATA BLOOD PRESSURE: NORMAL MMHG
BH CV IMMEDIATE POST TECH DATA HEART RATE: 124 BPM
BH CV IMMEDIATE POST TECH DATA OXYGEN SATS: 98 %
BH CV NINE MINUTE RECOVERY TECH DATA BLOOD PRESSURE: NORMAL MMHG
BH CV NINE MINUTE RECOVERY TECH DATA HEART RATE: 82 BPM
BH CV NINE MINUTE RECOVERY TECH DATA OXYGEN SATURATION: 98 %
BH CV NINE MINUTE RECOVERY TECH DATA SYMPTOMS: NORMAL
BH CV REST NUCLEAR ISOTOPE DOSE: 9.3 MCI
BH CV SIX MINUTE RECOVERY TECH DATA BLOOD PRESSURE: NORMAL
BH CV SIX MINUTE RECOVERY TECH DATA HEART RATE: 88 BPM
BH CV SIX MINUTE RECOVERY TECH DATA OXYGEN SATURATION: 98 %
BH CV SIX MINUTE RECOVERY TECH DATA SYMPTOMS: NORMAL
BH CV STRESS BP STAGE 1: NORMAL
BH CV STRESS BP STAGE 2: NORMAL
BH CV STRESS DURATION MIN STAGE 1: 3
BH CV STRESS DURATION MIN STAGE 2: 3
BH CV STRESS DURATION SEC STAGE 1: 0
BH CV STRESS DURATION SEC STAGE 2: 0
BH CV STRESS GRADE STAGE 1: 10
BH CV STRESS GRADE STAGE 2: 12
BH CV STRESS HR STAGE 1: 118
BH CV STRESS HR STAGE 2: 135
BH CV STRESS METS STAGE 1: 5
BH CV STRESS METS STAGE 2: 7.5
BH CV STRESS NUCLEAR ISOTOPE DOSE: 30.2 MCI
BH CV STRESS O2 STAGE 1: 92
BH CV STRESS O2 STAGE 2: 94
BH CV STRESS PROTOCOL 1: NORMAL
BH CV STRESS RECOVERY BP: NORMAL MMHG
BH CV STRESS RECOVERY HR: 82 BPM
BH CV STRESS RECOVERY O2: 98 %
BH CV STRESS SPEED STAGE 1: 1.7
BH CV STRESS SPEED STAGE 2: 2.5
BH CV STRESS STAGE 1: 1
BH CV STRESS STAGE 2: 2
BH CV THREE MINUTE POST TECH DATA BLOOD PRESSURE: NORMAL MMHG
BH CV THREE MINUTE POST TECH DATA HEART RATE: 94 BPM
BH CV THREE MINUTE POST TECH DATA OXYGEN SATURATION: 100 %
BH CV THREE MINUTE RECOVERY TECH DATA SYMPTOM: NORMAL
LV EF NUC BP: 36 %
MAXIMAL PREDICTED HEART RATE: 158 BPM
PERCENT MAX PREDICTED HR: 85.44 %
STRESS BASELINE BP: NORMAL MMHG
STRESS BASELINE HR: 81 BPM
STRESS O2 SAT REST: 96 %
STRESS PERCENT HR: 101 %
STRESS POST ESTIMATED WORKLOAD: 7.1 METS
STRESS POST EXERCISE DUR MIN: 6 MIN
STRESS POST EXERCISE DUR SEC: 0 SEC
STRESS POST O2 SAT PEAK: 94 %
STRESS POST PEAK BP: NORMAL MMHG
STRESS POST PEAK HR: 135 BPM
STRESS TARGET HR: 134 BPM

## 2022-09-26 PROCEDURE — A9502 TC99M TETROFOSMIN: HCPCS | Performed by: INTERNAL MEDICINE

## 2022-09-26 PROCEDURE — 0 TECHNETIUM TETROFOSMIN KIT: Performed by: INTERNAL MEDICINE

## 2022-09-26 PROCEDURE — 78451 HT MUSCLE IMAGE SPECT SING: CPT | Performed by: INTERNAL MEDICINE

## 2022-09-26 PROCEDURE — 78451 HT MUSCLE IMAGE SPECT SING: CPT

## 2022-09-26 PROCEDURE — 93017 CV STRESS TEST TRACING ONLY: CPT

## 2022-09-26 PROCEDURE — 93018 CV STRESS TEST I&R ONLY: CPT | Performed by: INTERNAL MEDICINE

## 2022-09-26 PROCEDURE — 93016 CV STRESS TEST SUPVJ ONLY: CPT | Performed by: NURSE PRACTITIONER

## 2022-09-26 RX ADMIN — TETROFOSMIN 1 DOSE: 1.38 INJECTION, POWDER, LYOPHILIZED, FOR SOLUTION INTRAVENOUS at 10:25

## 2022-09-26 RX ADMIN — TETROFOSMIN 1 DOSE: 1.38 INJECTION, POWDER, LYOPHILIZED, FOR SOLUTION INTRAVENOUS at 11:51

## 2022-10-03 ENCOUNTER — TELEPHONE (OUTPATIENT)
Dept: CARDIOLOGY | Facility: CLINIC | Age: 62
End: 2022-10-03

## 2022-10-03 DIAGNOSIS — I50.42 CHRONIC COMBINED SYSTOLIC AND DIASTOLIC CONGESTIVE HEART FAILURE: Primary | ICD-10-CM

## 2022-10-03 RX ORDER — SACUBITRIL AND VALSARTAN 24; 26 MG/1; MG/1
1 TABLET, FILM COATED ORAL 2 TIMES DAILY
Qty: 60 TABLET | Refills: 2 | Status: SHIPPED | OUTPATIENT
Start: 2022-10-03 | End: 2022-11-01 | Stop reason: SDUPTHER

## 2022-10-03 NOTE — TELEPHONE ENCOUNTER
SW patient regarding medication changes and up coming lab work. Patient verbalized understanding.

## 2022-10-03 NOTE — TELEPHONE ENCOUNTER
----- Message from Olivia Min MD sent at 9/30/2022  3:56 PM EDT -----  I tried to call him but he did not .  Please call him the result.  He needs to discontinue his lisinopril and 48 hours later start Entresto 24-26 twice daily.  Arrange for a BMP and a BR NP in 2 weeks diagnosis CHF.    Please ask him how are his myalgias

## 2022-10-03 NOTE — TELEPHONE ENCOUNTER
Attempted to call patient will results and medication changes. Left voicemail and call back number.

## 2022-10-05 ENCOUNTER — TELEPHONE (OUTPATIENT)
Dept: CARDIOLOGY | Facility: CLINIC | Age: 62
End: 2022-10-05

## 2022-10-05 NOTE — TELEPHONE ENCOUNTER
----- Message from Michelle Arana MA sent at 10/5/2022 12:18 PM EDT -----    ----- Message -----  From: Olivia Min MD  Sent: 9/30/2022   3:56 PM EDT  To: Nidia Leigh RN    I tried to call him but he did not .  Please call him the result.  He needs to discontinue his lisinopril and 48 hours later start Entresto 24-26 twice daily.  Arrange for a BMP and a BR NP in 2 weeks diagnosis CHF.    Please ask him how are his myalgias

## 2022-10-06 NOTE — TELEPHONE ENCOUNTER
Spoke to [facility], She said her computer is down. She took the information from me to start a free 30 day trial.    RXBIN: [id number]  RXPCN: [id number]  RXGRP: [id number]  RXID: [id number]

## 2022-11-01 DIAGNOSIS — I50.42 CHRONIC COMBINED SYSTOLIC AND DIASTOLIC CONGESTIVE HEART FAILURE: ICD-10-CM

## 2022-11-01 RX ORDER — SACUBITRIL AND VALSARTAN 24; 26 MG/1; MG/1
1 TABLET, FILM COATED ORAL 2 TIMES DAILY
Qty: 180 TABLET | Refills: 3 | Status: SHIPPED | OUTPATIENT
Start: 2022-11-01 | End: 2022-11-08 | Stop reason: SDUPTHER

## 2022-11-08 RX ORDER — SACUBITRIL AND VALSARTAN 24; 26 MG/1; MG/1
1 TABLET, FILM COATED ORAL 2 TIMES DAILY
Qty: 180 TABLET | Refills: 3 | Status: SHIPPED | OUTPATIENT
Start: 2022-11-08

## 2022-11-08 RX ORDER — SACUBITRIL AND VALSARTAN 24; 26 MG/1; MG/1
1 TABLET, FILM COATED ORAL 2 TIMES DAILY
Qty: 180 TABLET | Refills: 3 | Status: SHIPPED | OUTPATIENT
Start: 2022-11-08 | End: 2022-11-08 | Stop reason: SDUPTHER

## 2023-02-07 ENCOUNTER — TELEPHONE (OUTPATIENT)
Dept: CARDIOLOGY | Facility: CLINIC | Age: 63
End: 2023-02-07
Payer: MEDICARE

## 2023-02-07 NOTE — TELEPHONE ENCOUNTER
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 Following up on them, called patient, he stated he is not taking the Entresto. Told him that they tried to reach him to sign papers and would ask if ok to give samples in the meantime. He did not have a follow up appt, stated that he was 'way overdue' so I sent him to the  to schedule this. He stated he would schedule an appointment and come in to sign PAP paperwork.

## 2023-02-10 ENCOUNTER — OFFICE VISIT (OUTPATIENT)
Dept: CARDIOLOGY | Facility: CLINIC | Age: 63
End: 2023-02-10
Payer: MEDICARE

## 2023-02-10 VITALS
DIASTOLIC BLOOD PRESSURE: 68 MMHG | HEART RATE: 97 BPM | BODY MASS INDEX: 27.86 KG/M2 | WEIGHT: 210.2 LBS | HEIGHT: 73 IN | SYSTOLIC BLOOD PRESSURE: 105 MMHG

## 2023-02-10 DIAGNOSIS — I25.118 CORONARY ARTERY DISEASE OF NATIVE ARTERY OF NATIVE HEART WITH STABLE ANGINA PECTORIS: Primary | ICD-10-CM

## 2023-02-10 DIAGNOSIS — I10 ESSENTIAL HYPERTENSION: ICD-10-CM

## 2023-02-10 DIAGNOSIS — E78.5 HYPERLIPIDEMIA LDL GOAL <70: ICD-10-CM

## 2023-02-10 DIAGNOSIS — R00.2 PALPITATIONS: ICD-10-CM

## 2023-02-10 DIAGNOSIS — I73.9 PVD (PERIPHERAL VASCULAR DISEASE) WITH CLAUDICATION: ICD-10-CM

## 2023-02-10 DIAGNOSIS — G47.30 SLEEP-DISORDERED BREATHING: ICD-10-CM

## 2023-02-10 DIAGNOSIS — I50.42 CHRONIC COMBINED SYSTOLIC AND DIASTOLIC CONGESTIVE HEART FAILURE: ICD-10-CM

## 2023-02-10 PROCEDURE — 99214 OFFICE O/P EST MOD 30 MIN: CPT | Performed by: INTERNAL MEDICINE

## 2023-02-10 RX ORDER — ONDANSETRON HYDROCHLORIDE 8 MG/1
TABLET, FILM COATED ORAL EVERY 8 HOURS PRN
COMMUNITY

## 2023-02-10 RX ORDER — PREDNISONE 10 MG/1
10 TABLET ORAL
COMMUNITY

## 2023-02-10 RX ORDER — COLCHICINE 0.6 MG/1
0.6 TABLET ORAL DAILY
COMMUNITY

## 2023-02-10 RX ORDER — DIPHENOXYLATE HYDROCHLORIDE AND ATROPINE SULFATE 2.5; .025 MG/1; MG/1
TABLET ORAL DAILY
COMMUNITY

## 2023-02-10 RX ORDER — LEVOTHYROXINE SODIUM 112 UG/1
100 TABLET ORAL DAILY
COMMUNITY

## 2023-02-10 NOTE — PROGRESS NOTES
Chief Complaint  Coronary Artery Disease, Follow-up, Hyperlipidemia, Hypertension, Fatigue, and Congestive Heart Failure    Subjective      Patient returns clinic for follow-up visit.  He has CAD, status post previous PCI.  He has no angina or dyspnea.  He has no orthopnea, edema, presyncope or syncope.  He has occasional palpitations which are sporadic.  He denies aggravating or relieving factors.  He continues to have general fatigue and tiredness.  He has daytime sleepiness.  He was previously diagnosed with sleep apnea/narcolepsy but is currently not treated for that.  He lost follow-up with his previous sleep medicine for specialist.    Past Medical History:   Diagnosis Date   • Anemia    • Angina pectoris (HCC)    • Anxiety and depression    • Arthritis 2000   • Asthma    • Bipolar disorder ([facility]) 2008   • Broken bones    • Bronchitis    • Bulging lumbar disc    • CAD (coronary artery disease)    • Crohn's colitis ([facility])    • Deafness 2010   • Deviated nasal septum    • Diabetes mellitus ([facility])    • Disease of thyroid gland    • Dysphagia    • Erectile dysfunction 04/23/2018   • Forgetfulness 2008   • GERD (gastroesophageal reflux disease)    • Gout 2007   • H/O ulceration     Ulcer   • Heart attack ([facility])    • Hemorrhoids 2000   • Hiatal hernia    • HLD (hyperlipidemia)     High Cholesterol   • Hypertension    • Hypoparathyroidism ([facility])    • Hypothyroidism    • Leg pain    • Limb swelling    • Liver disease    • Muscle cramps    • Muscular dystrophy ([facility])    • Myocardial infarction (HCC)     x 2   • Nasal obstruction    • Neurologic disorder     Unspecified   • Night sweat    • LYNNETTE (obstructive sleep apnea)    • Peptic ulceration    • Perineal pain in male 05/24/2018   • 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[facility])    • Pneumonia    • Rectal bleeding    • Seasonal allergies    • Sinus trouble    • Skin disease 2000    Skin Disease/Psoriasis/eczema   • Testosterone deficiency    • Testosterone deficiency in male    • Trouble  swallowing    • Vestibular migraine    • Vitamin D deficiency          Current Outpatient Medications:   •  allopurinol (ZYLOPRIM) 300 MG tablet, Take 1 tablet by mouth Daily., Disp: 90 tablet, Rfl: 1  •  aspirin (aspirin) 81 MG EC tablet, Take 1 tablet by mouth Daily., Disp: , Rfl:   •  B Complex Vitamins (VITAMIN B COMPLEX PO), Take  by mouth., Disp: , Rfl:   •  carvedilol (COREG) 6.25 MG tablet, Take 1 tablet by mouth 2 (Two) Times a Day With Meals., Disp: 180 tablet, Rfl: 3  •  cetirizine (zyrTEC) 10 MG tablet, Take 10 mg by mouth Daily., Disp: , Rfl:   •  colchicine 0.6 MG tablet, Take 0.6 mg by mouth Daily., Disp: , Rfl:   •  Cyanocobalamin 1000 MCG/ML kit, Inject as directed every 30 (thirty) days., Disp: , Rfl:   •  doxazosin (CARDURA) 4 MG tablet, Take 4 mg by mouth Every Night., Disp: , Rfl:   •  Dulaglutide 1.5 MG/0.5ML solution pen-injector, Inject  under the skin into the appropriate area as directed., Disp: , Rfl:   •  famotidine (PEPCID) 20 MG tablet, Take 20 mg by mouth 2 (Two) Times a Day., Disp: , Rfl:   •  levothyroxine (SYNTHROID, LEVOTHROID) 112 MCG tablet, Take 100 mcg by mouth Daily., Disp: , Rfl:   •  linaclotide (LINZESS) 145 MCG capsule capsule, Take 1 capsule by mouth Every Morning Before Breakfast. (Patient taking differently: Take 290 mcg by mouth Every Morning Before Breakfast.), Disp: 90 capsule, Rfl: 1  •  metFORMIN (GLUCOPHAGE) 1000 MG tablet, Take 1,000 mg by mouth 2 (Two) Times a Day With Meals., Disp: , Rfl:   •  multivitamin (THERAGRAN) tablet tablet, Take  by mouth Daily., Disp: , Rfl:   •  ondansetron (ZOFRAN) 8 MG tablet, Take  by mouth Every 8 (Eight) Hours As Needed for Nausea or Vomiting., Disp: , Rfl:   •  ONETOUCH VERIO test strip, Use to test BG 3 times daily. DX Code: E11.9, Disp: 300 each, Rfl: 1  •  pantoprazole (PROTONIX) 40 MG EC tablet, Take 40 mg by mouth Daily., Disp: , Rfl:   •  predniSONE (DELTASONE) 10 MG tablet, Take 10 mg by mouth Every 14 (Fourteen) Days.,  "Disp: , Rfl:   •  ranitidine (ZANTAC) 150 MG capsule, Take 1 capsule by mouth Daily., Disp: , Rfl:   •  sacubitril-valsartan (Entresto) 24-26 MG tablet, Take 1 tablet by mouth 2 (Two) Times a Day., Disp: 180 tablet, Rfl: 3  •  spironolactone (ALDACTONE) 25 MG tablet, Take 1 tablet by mouth Daily., Disp: 90 tablet, Rfl: 1  •  tamsulosin (FLOMAX) 0.4 MG capsule 24 hr capsule, Take 0.4 mg by mouth Daily., Disp: , Rfl:   •  TRESIBA FLEXTOUCH 200 UNIT/ML solution pen-injector, Inject 80 Units under the skin into the appropriate area as directed Daily., Disp: 9 pen, Rfl: 1    Medications Discontinued During This Encounter   Medication Reason   • atorvastatin (LIPITOR) 40 MG tablet *Therapy completed   • glipizide (GLUCOTROL) 10 MG tablet *Therapy completed     Allergies   Allergen Reactions   • Penicillins Anaphylaxis   • Bacitracin-Neomycin-Polymyxin Hives     Hives and blisters   • A-D-Dimethicone-E-Zno [Dimethicone-Zinc Oxide] Unknown - Low Severity   • Bydureon [Exenatide] Hives   • Bacitracin Itching and Rash        Social History     Tobacco Use   • Smoking status: Former     Packs/day: 2.00     Years: 24.00     Pack years: 48.00     Types: Cigarettes     Quit date:      Years since quittin.1   • Smokeless tobacco: Never   • Tobacco comments:     20 years ago   Substance Use Topics   • Alcohol use: No   • Drug use: No       Family History   Problem Relation Age of Onset   • Stroke Mother    • Heart disease Mother    • Diabetes Mother         Diabetes Mellitus   • Arthritis Mother    • Diabetes Father         Diabetes Mellitus   • Arthritis Father    • Heart disease Sister    • Diabetes Sister         Diabetes Mellitus   • Heart disease Brother    • Cancer Brother         Unspecified   • Diabetes Brother         Diabetes Mellitus   • Arthritis Brother    • Other Daughter         Renal Calculus   • Colon cancer Maternal Uncle         Objective     /68   Pulse 97   Ht 185.4 cm (73\")   Wt 95.3 kg (210 " lb 3.2 oz)   BMI 27.73 kg/m²       Physical Exam    General Appearance:   · no acute distress  · Alert and oriented x3  HENT:   · lips not cyanotic  · Atraumatic  Neck:  · No jvd   · supple  Respiratory:  · no respiratory distress  · normal breath sounds  · no rales  Cardiovascular:  · no S3, no S4   · no murmur  · no rub  Extremities  · No cyanosis  · lower extremity edema: none    Skin:   · warm, dry  · No rashes      Result Review :     No results found for: PROBNP  CMP    CMP 9/15/22   Glucose 325 (A)   BUN 14   Creatinine 0.80   eGFR 100.1   Sodium 139   Potassium 4.3   Chloride 105   Calcium 9.7   Total Protein 6.2   Albumin 4.10   Globulin 2.1   Total Bilirubin 0.2   Alkaline Phosphatase 122 (A)   AST (SGOT) 19   ALT (SGPT) 17   Albumin/Globulin Ratio 2.0   BUN/Creatinine Ratio 17.5   Anion Gap 11.0   (A) Abnormal value       Comments are available for some flowsheets but are not being displayed.              Lab Results   Component Value Date    TSH 0.014 (L) 09/15/2022      Lab Results   Component Value Date    FREET4 0.50 (L) 12/06/2022      No results found for: DDIMERQUANT  No results found for: MG   No results found for: DIGOXIN   Lab Results   Component Value Date    TROPONINT <0.010 01/14/2020             No results found for: POCTROP    Results for orders placed in visit on 09/27/22    Adult Transthoracic Echo Complete W/ Cont if Necessary Per Protocol    Interpretation Summary  · Left ventricular ejection fraction appears to be 41 - 45%.  · Left ventricular diastolic function is consistent with (grade I) impaired relaxation.  · The following left ventricular wall segments are hypokinetic: mid anterior, apical septal and mid anteroseptal.  · No hemodynamically significant valvular pathology.                 Diagnoses and all orders for this visit:    1. Coronary artery disease of native artery of native heart with stable angina pectoris (HCC) (Primary)    2. Chronic combined systolic and diastolic  congestive heart failure (HCC)    3. Essential hypertension    4. Hyperlipidemia LDL goal <70    5. PVD (peripheral vascular disease) with claudication (HCC)  -     Doppler Arterial Lower Extremity Stress CAR; Future    6. Palpitations  -     Holter Monitor - 72 Hour Up To 15 Days; Future    7. Sleep-disordered breathing  -     Ambulatory Referral to Sleep Medicine      Assessment:    -CAD: Status post previous revascularization.  He is doing well without angina or angina-like symptoms.  Recent myocardial perfusion scan demonstrated previous infarct in the anterior segment without significant medical ischemia.  LVEF is preserved.  Continue current medical therapy.    -Combined systolic/diastolic congestive heart failure: LVEF 40 to 45%.  He is well compensated on physical examination.  Continue current medical therapy and clinical monitoring.    -Essential hypertension: His blood pressure is overly treated with occasional fatigue and postural dizziness.  He was advised to reduce Entresto to half a tablet twice daily.  Continue other blood pressure medications and blood pressure monitoring.    -Peripheral vascular disease: He reports bilateral lower extremity weakness/discomfort with walking short distance.  Pulses are mildly diminished.  Previous WYATT was benign.  He will be scheduled for exercise WYATT.      -Sleep disordered breathing: He will be referred to sleep medicine for further evaluation.      -Palpitations: Etiology to be determined.  1 week Holter monitor will be done to assess for ectopy and tachyarrhythmias.      Follow Up     Return for Return to clinic after diagnostic testing, With Charley ABREU.        Patient was given instructions and counseling regarding his condition or for health maintenance advice. Please see specific information pulled into the AVS if appropriate.

## 2023-02-17 ENCOUNTER — TELEPHONE (OUTPATIENT)
Dept: CARDIOLOGY | Facility: CLINIC | Age: 63
End: 2023-02-17
Payer: MEDICARE

## 2023-02-17 NOTE — TELEPHONE ENCOUNTER
Received fax from [employer], denying Entresto, stating that 'Household Income Exceeds Program Limits.'     Questions should be directed to [phone] option 2.     Please advise.

## 2023-02-20 NOTE — TELEPHONE ENCOUNTER
Patient notified. He stated he is not able to afford the medicine and would need something cheaper. Please advise.

## 2023-02-20 NOTE — TELEPHONE ENCOUNTER
Have him stop by for samples until we can see him in the office and decide which medication to switch him to. Thanks

## 2023-02-21 NOTE — TELEPHONE ENCOUNTER
Spoke with pt, he has a f/u appt with Charley on 4-17-23 and is has enough samples for about a month or so he stated. He will stop by the [facility] office when he needs more samples prior to his appointment.

## 2023-03-01 ENCOUNTER — TELEPHONE (OUTPATIENT)
Dept: CARDIOLOGY | Facility: CLINIC | Age: 63
End: 2023-03-01
Payer: MEDICARE

## 2023-03-01 NOTE — TELEPHONE ENCOUNTER
----- Message from LOIS Garay sent at 2/23/2023  4:36 PM EST -----  Notify patient the results of their Holter monitor were unremarkable.  There were some occasional skipped beats noted but no ectopy or concerning arrhythmias.

## 2023-03-07 ENCOUNTER — HOSPITAL ENCOUNTER (OUTPATIENT)
Dept: CARDIOLOGY | Facility: HOSPITAL | Age: 63
Discharge: HOME | End: 2023-03-07
Admitting: INTERNAL MEDICINE
Payer: MEDICARE

## 2023-03-07 DIAGNOSIS — I73.9 PVD (PERIPHERAL VASCULAR DISEASE) WITH CLAUDICATION: ICD-10-CM

## 2023-03-07 LAB
BH CV LOWER ARTERIAL LEFT ABI RATIO: 1.4
BH CV LOWER ARTERIAL LEFT CALF RATIO: 1.2
BH CV LOWER ARTERIAL LEFT DORSALIS PEDIS SYS MAX: 161
BH CV LOWER ARTERIAL LEFT GREAT TOE SYS MAX: 124
BH CV LOWER ARTERIAL LEFT LOW THIGH RATIO: 1.2
BH CV LOWER ARTERIAL LEFT LOW THIGH SYS MAX: 134
BH CV LOWER ARTERIAL LEFT POPLITEAL SYS MAX: 141
BH CV LOWER ARTERIAL LEFT POST TIBIAL SYS MAX: 141
BH CV LOWER ARTERIAL LEFT TBI RATIO: 1.1
BH CV LOWER ARTERIAL RIGHT ABI RATIO: 1.4
BH CV LOWER ARTERIAL RIGHT CALF RATIO: 1.4
BH CV LOWER ARTERIAL RIGHT DORSALIS PEDIS SYS MAX: 149
BH CV LOWER ARTERIAL RIGHT GREAT TOE SYS MAX: 146
BH CV LOWER ARTERIAL RIGHT LOW THIGH RATIO: 1.3
BH CV LOWER ARTERIAL RIGHT LOW THIGH SYS MAX: 149
BH CV LOWER ARTERIAL RIGHT POPLITEAL SYS MAX: 160
BH CV LOWER ARTERIAL RIGHT POST TIBIAL SYS MAX: 159
BH CV LOWER ARTERIAL RIGHT TBI RATIO: 1.3
MAXIMAL PREDICTED HEART RATE: 158 BPM
STRESS TARGET HR: 134 BPM
UPPER ARTERIAL LEFT ARM BRACHIAL SYS MAX: 115 MMHG
UPPER ARTERIAL RIGHT ARM BRACHIAL SYS MAX: 104 MMHG

## 2023-03-07 PROCEDURE — 93924 LWR XTR VASC STDY BILAT: CPT | Performed by: SURGERY

## 2023-03-07 PROCEDURE — 93924 LWR XTR VASC STDY BILAT: CPT

## 2023-03-29 ENCOUNTER — OFFICE VISIT (OUTPATIENT)
Dept: GASTROENTEROLOGY | Facility: CLINIC | Age: 63
End: 2023-03-29
Payer: MEDICARE

## 2023-03-29 ENCOUNTER — PREP FOR SURGERY (OUTPATIENT)
Dept: OTHER | Facility: HOSPITAL | Age: 63
End: 2023-03-29
Payer: MEDICARE

## 2023-03-29 VITALS
HEIGHT: 73 IN | WEIGHT: 213.8 LBS | HEART RATE: 80 BPM | DIASTOLIC BLOOD PRESSURE: 84 MMHG | BODY MASS INDEX: 28.34 KG/M2 | SYSTOLIC BLOOD PRESSURE: 128 MMHG

## 2023-03-29 DIAGNOSIS — Z86.010 HISTORY OF COLON POLYPS: ICD-10-CM

## 2023-03-29 DIAGNOSIS — R10.30 LOWER ABDOMINAL PAIN: ICD-10-CM

## 2023-03-29 DIAGNOSIS — K59.04 CHRONIC IDIOPATHIC CONSTIPATION: ICD-10-CM

## 2023-03-29 DIAGNOSIS — R13.19 ESOPHAGEAL DYSPHAGIA: Primary | ICD-10-CM

## 2023-03-29 DIAGNOSIS — R11.2 NAUSEA AND VOMITING, UNSPECIFIED VOMITING TYPE: ICD-10-CM

## 2023-03-29 PROBLEM — Z86.0100 HISTORY OF COLON POLYPS: Status: ACTIVE | Noted: 2023-03-29

## 2023-03-29 RX ORDER — POLYETHYLENE GLYCOL 3350, SODIUM SULFATE ANHYDROUS, SODIUM BICARBONATE, SODIUM CHLORIDE, POTASSIUM CHLORIDE 227.1; 21.5; 6.36; 5.53; .754 G/L; G/L; G/L; G/L; G/L
4 POWDER, FOR SOLUTION ORAL DAILY
Qty: 1 EACH | Refills: 0 | Status: SHIPPED | OUTPATIENT
Start: 2023-03-29 | End: 2023-03-30

## 2023-03-29 RX ORDER — INSULIN GLARGINE 300 U/ML
30 INJECTION, SOLUTION SUBCUTANEOUS DAILY
COMMUNITY
Start: 2023-02-22

## 2023-03-29 NOTE — PROGRESS NOTES
Patient Name: Narciso Mejía   Visit Date: 03/29/2023   Patient ID: [id number]  Provider: LOIS Sky    Sex: male  Location:  Location Address:  Location Phone: [phone]  [place] [address] [phone]    YOB: 1960  Age: 62 y.o.   Primary Care Provider Marco Nicholson MD      Referring Provider: No ref. provider found        Chief Complaint  Abdominal Pain (Lower ABD pain daily ), Constipation (Pt states having 2-3 Bms weekly with taking stool softener ), Bloated (Pt states after meals ), and Vomiting (2-3 episodes weekly )    History of Present Illness    We have been seeing pt intermittently since 2015. He has a hx of rectal pain, dysphagia, elev alk phos w neg. AMA 2017, neg GGT 2017.   Last EGD 2015 - + large HH, otherwise neg.  Last Colonoscopy 2018: diffuse melanosis--bx c/w melanosis coli, 5 mm adenomatous polyp removed from sigmoid. +int. hemorrhoids    Patient was last seen May 2021 still with complaint of dysphagia, EGD had been ordered for him but he did not have done due to an issue the day of the procedure with staff.  Linzess was increased to 290 mcg/day for constipation. Reordered EGD but pt decided to cancel.    Pt states he continues to have dysphagia, he feels it started after last EGD in 2015. States he gets choked on food at times, trouble w dry foods. Feels lodged in throat area. Still w HB, taking protonix and feels it does work well, mainly has HB if skips Protonix. Pepcid BID (prescribed for allergy issues)   Has had nausea and lower abd pain, gets full quickly, has had vomiting. Lower abd pain feels r/t constipation.   Still w constipation, Linzess 290 did not help and was too expensive. Taking miralax, and OTC laxative, 1-2 stools/d. States PCP working on getting linzess for him w insurance.   No blood in stool  Taking  1/2 of coreg dose d/t low BP w full dose  Past Medical History:   Diagnosis Date   • Anemia    • Angina pectoris (HCC)    • Anxiety  and depression    • Arthritis 2000   • Asthma    • Bipolar disorder ([facility]) 2008   • Broken bones    • Bronchitis    • Bulging lumbar disc    • CAD (coronary artery disease)    • Crohn's colitis ([facility])    • Deafness 2010   • Deviated nasal septum    • Diabetes mellitus ([facility])    • Disease of thyroid gland    • Dysphagia    • Erectile dysfunction 04/23/2018   • Forgetfulness 2008   • GERD (gastroesophageal reflux disease)    • Gout 2007   • H/O ulceration     Ulcer   • Heart attack ([facility])    • Hemorrhoids 2000   • Hiatal hernia    • HLD (hyperlipidemia)     High Cholesterol   • Hypertension    • Hypoparathyroidism ([facility])    • Hypothyroidism    • Leg pain    • Limb swelling    • Liver disease    • Muscle cramps    • Muscular dystrophy ([facility])    • Myocardial infarction (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)    • Pneumonia    • Rectal bleeding    • Seasonal allergies    • Sinus trouble    • Skin disease 2000    Skin Disease/Psoriasis/eczema   • Testosterone deficiency    • Testosterone deficiency in male    • Trouble swallowing    • Vestibular migraine    • Vitamin D deficiency        Past Surgical History:   Procedure Laterality Date   • APPENDECTOMY  1976   • CARPAL TUNNEL RELEASE     • COLONOSCOPY     • CORONARY ANGIOPLASTY WITH STENT PLACEMENT     • ENDOSCOPY     • ESOPHAGUS SURGERY     • HERNIA REPAIR      Umbilical   • NASAL SEPTUM SURGERY  04/2018    Dr. Pearl - Septoplasty with bilateral reduction of nasal turbinates   • POLYPECTOMY     • TONSILECTOMY, ADENOIDECTOMY, BILATERAL MYRINGOTOMY AND TUBES  1972       Allergies   Allergen Reactions   • Penicillins Anaphylaxis   • Bacitracin-Neomycin-Polymyxin Hives     Hives and blisters   • A-D-Dimethicone-E-Zno [Dimethicone-Zinc Oxide] Unknown - Low Severity   • Bydureon [Exenatide] Hives  "  • Bacitracin Itching and Rash       Family History   Problem Relation Age of Onset   • Stroke Mother    • Heart disease Mother    • Diabetes Mother         Diabetes Mellitus   • Arthritis Mother    • Diabetes Father         Diabetes Mellitus   • Arthritis Father    • Asthma Father            • Heart attack Father    • Heart disease Father         Heart Failure   • Heart disease Sister    • Diabetes Sister         Diabetes Mellitus   • Heart disease Brother         Heart failure   • Cancer Brother         Unspecified   • Diabetes Brother         Diabetes Mellitus   • Arthritis Brother    • Colon cancer Maternal Uncle 71   • Colon cancer Maternal Grandfather 90   • Other Daughter         Renal Calculus        Social History     Tobacco Use   • Smoking status: Former     Packs/day: 2.00     Years: 25.00     Pack years: 50.00     Types: Cigarettes     Start date: 1976     Quit date: 1999     Years since quittin.2   • Smokeless tobacco: Never   • Tobacco comments:     20 years ago   Vaping Use   • Vaping Use: Never used   Substance Use Topics   • Alcohol use: Not Currently   • Drug use: No       Objective     Vital Signs:   /84 (BP Location: Left arm, Patient Position: Sitting, Cuff Size: Adult)   Pulse 80   Ht 185.4 cm (73\")   Wt 97 kg (213 lb 12.8 oz)   BMI 28.21 kg/m²       Physical Exam  Constitutional:       General: The patient is not in acute distress.     Appearance: Normal appearance.   HENT:      Head: Normocephalic and atraumatic.      Nose: Nose normal.   Pulmonary:      Effort: Pulmonary effort is normal. No respiratory distress.   Abdominal:      General: Abdomen is flat.      Palpations: Abdomen is soft. There is no mass.      Tenderness: There is no abdominal tenderness. There is no guarding.   Musculoskeletal:      Cervical back: Neck supple.      Right lower leg: No edema.      Left lower leg: No edema.   Skin:     General: Skin is warm and dry.   Neurological:      " General: No focal deficit present.      Mental Status: The patient is alert and oriented to person, place, and time.      Gait: Gait normal.   Psychiatric:         Mood and Affect: Mood normal.         Speech: Speech normal.         Behavior: Behavior normal.         Thought Content: Thought content normal.     Result Review :   The following data was reviewed by: LOIS Sky on 03/29/2023:      CMP    CMP 9/15/22   Glucose 325 (A)   BUN 14   Creatinine 0.80   eGFR 100.1   Sodium 139   Potassium 4.3   Chloride 105   Calcium 9.7   Total Protein 6.2   Albumin 4.10   Globulin 2.1   Total Bilirubin 0.2   Alkaline Phosphatase 122 (A)   AST (SGOT) 19   ALT (SGPT) 17   Albumin/Globulin Ratio 2.0   BUN/Creatinine Ratio 17.5   Anion Gap 11.0   (A) Abnormal value       Comments are available for some flowsheets but are not being displayed.                         Assessment and Plan    Diagnoses and all orders for this visit:    1. Esophageal dysphagia (Primary)    2. Chronic idiopathic constipation    3. Nausea and vomiting, unspecified vomiting type  -     NM Gastric Emptying; Future    4. Lower abdominal pain    5. History of colon polyps    Other orders  -     PEG 3350-KCl-NaBcb-NaCl-NaSulf (Golytely) 227.1 g pack; Take 4 L by mouth Daily for 1 day. Take per office instructions  Dispense: 1 each; Refill: 0              Follow Up   Return if symptoms worsen or fail to improve.   Linzess 290 samples - reported PCP trying to get covered for him  EGD/COLONOSCOPY Surgical Risk and Benefits: Possible risks/complications, benefits, and alternatives to surgical or invasive procedure have been explained to patient and/or legal guardian; risks include bleeding, infection, and perforation. Patient has been evaluated and can tolerate anesthesia and/or sedation. Risks, benefits, and alternatives to anesthesia and sedation have been explained to patient and/or legal guardian. CARDIO CLEARANCE DR WADDELL  Check GES  d/t N/V c/o  Advised pt he should let his cardio know he has reduced his Coreg dose d/t lower BP w full dose    Patient was given instructions and counseling regarding his condition or for health maintenance advice. Please see specific information pulled into the AVS if appropriate.

## 2023-04-19 ENCOUNTER — OFFICE VISIT (OUTPATIENT)
Dept: CARDIOLOGY | Facility: CLINIC | Age: 63
End: 2023-04-19
Payer: MEDICARE

## 2023-04-19 ENCOUNTER — TELEPHONE (OUTPATIENT)
Dept: CARDIOLOGY | Facility: CLINIC | Age: 63
End: 2023-04-19
Payer: MEDICARE

## 2023-04-19 VITALS
HEIGHT: 73 IN | BODY MASS INDEX: 29.9 KG/M2 | DIASTOLIC BLOOD PRESSURE: 67 MMHG | WEIGHT: 225.6 LBS | SYSTOLIC BLOOD PRESSURE: 94 MMHG | HEART RATE: 78 BPM

## 2023-04-19 DIAGNOSIS — I10 ESSENTIAL HYPERTENSION: ICD-10-CM

## 2023-04-19 DIAGNOSIS — I50.42 CHRONIC COMBINED SYSTOLIC AND DIASTOLIC CONGESTIVE HEART FAILURE: ICD-10-CM

## 2023-04-19 DIAGNOSIS — I25.118 CORONARY ARTERY DISEASE OF NATIVE ARTERY OF NATIVE HEART WITH STABLE ANGINA PECTORIS: Primary | ICD-10-CM

## 2023-04-19 DIAGNOSIS — E78.5 HYPERLIPIDEMIA LDL GOAL <70: ICD-10-CM

## 2023-04-19 DIAGNOSIS — R07.89 CHEST PAIN, ATYPICAL: ICD-10-CM

## 2023-04-19 DIAGNOSIS — I73.9 PERIPHERAL VASCULAR DISEASE, UNSPECIFIED: ICD-10-CM

## 2023-04-19 NOTE — PROGRESS NOTES
Chief Complaint  Coronary Artery Disease, Follow-up, and Hypertension    Subjective        History of Present Illness  Narciso Mejía presents to [facility] CARDIOLOGY for follow up. Nomi is a 63-year-old  male with past medical history as outlined below, significant for coronary artery disease status post previous PCI, hypertension, hyperlipidemia, diabetes.  He presents for follow-up on recent testing.  He has occasional chest discomfort and palpitations that are unchanged from previous. He continues to have BP readings on the low side but has not cut his entresto in half as previously advised. He is dizzy and lightheaded especially in the morning and with position changes. He reports generalized fatigue and tiredness.  He denies any dyspnea, orthopnea, edema, syncope.    Past Medical History:   Diagnosis Date   • Anemia    • Angina pectoris    • Anxiety and depression    • Arthritis 2000   • Asthma    • Bipolar disorder 2008   • Broken bones    • Bronchitis    • Bulging lumbar disc    • CAD (coronary artery disease)    • Crohn's colitis    • Deafness 2010   • Deviated nasal septum    • Diabetes mellitus    • Disease of thyroid gland    • Dysphagia    • Erectile dysfunction 04/23/2018   • Forgetfulness 2008   • GERD (gastroesophageal reflux disease)    • Gout 2007   • H/O ulceration     Ulcer   • Heart attack    • Hemorrhoids 2000   • Hiatal hernia    • HLD (hyperlipidemia)     High Cholesterol   • Hypertension    • Hypoparathyroidism    • Hypothyroidism    • Leg pain    • Limb swelling    • Liver disease    • Muscle cramps    • Muscular dystrophy    • Myocardial infarction     x 2   • Nasal obstruction    • Neurologic disorder     Unspecified   • Night sweat    • LYNNETTE (obstructive sleep apnea)    • Peptic ulceration    • Perineal pain in male 05/24/2018   • Pituitary gland disorder    • Pneumonia    • Rectal bleeding    • Seasonal allergies    • Sinus trouble    • Skin disease 2000     Skin Disease/Psoriasis/eczema   • Testosterone deficiency    • Testosterone deficiency in male    • Trouble swallowing    • Vestibular migraine    • Vitamin D deficiency        ALLERGY  Allergies   Allergen Reactions   • Penicillins Anaphylaxis   • Bacitracin-Neomycin-Polymyxin Hives     Hives and blisters   • A-D-Dimethicone-E-Zno [Dimethicone-Zinc Oxide] Unknown - Low Severity   • Bydureon [Exenatide] Hives   • Bacitracin Itching and Rash        Past Surgical History:   Procedure Laterality Date   • APPENDECTOMY     • CARPAL TUNNEL RELEASE     • COLONOSCOPY     • CORONARY ANGIOPLASTY WITH STENT PLACEMENT     • ENDOSCOPY     • ESOPHAGUS SURGERY     • HERNIA REPAIR      Umbilical   • NASAL SEPTUM SURGERY  2018    Dr. Pearl - Septoplasty with bilateral reduction of nasal turbinates   • POLYPECTOMY     • TONSILECTOMY, ADENOIDECTOMY, BILATERAL MYRINGOTOMY AND TUBES  1972        Social History     Socioeconomic History   • Marital status:    Tobacco Use   • Smoking status: Former     Packs/day: 2.00     Years: 25.00     Pack years: 50.00     Types: Cigarettes     Start date: 1976     Quit date: 1999     Years since quittin.3   • Smokeless tobacco: Never   • Tobacco comments:     20 years ago   Vaping Use   • Vaping Use: Never used   Substance and Sexual Activity   • Alcohol use: Not Currently   • Drug use: No   • Sexual activity: Not Currently     Partners: Female     Birth control/protection: None       Family History   Problem Relation Age of Onset   • Stroke Mother    • Heart disease Mother    • Diabetes Mother         Diabetes Mellitus   • Arthritis Mother    • Diabetes Father         Diabetes Mellitus   • Arthritis Father    • Asthma Father            • Heart attack Father    • Heart disease Father         Heart Failure   • Heart disease Sister    • Diabetes Sister         Diabetes Mellitus   • Heart disease Brother         Heart failure   • Cancer Brother         Unspecified    • Diabetes Brother         Diabetes Mellitus   • Arthritis Brother    • Colon cancer Maternal Uncle 71   • Colon cancer Maternal Grandfather 90   • Other Daughter         Renal Calculus        Current Outpatient Medications on File Prior to Visit   Medication Sig   • allopurinol (ZYLOPRIM) 300 MG tablet Take 1 tablet by mouth Daily.   • aspirin (aspirin) 81 MG EC tablet Take 1 tablet by mouth Daily.   • B Complex Vitamins (VITAMIN B COMPLEX PO) Take  by mouth.   • carvedilol (COREG) 6.25 MG tablet Take 1 tablet by mouth 2 (Two) Times a Day With Meals.   • cetirizine (zyrTEC) 10 MG tablet Take 1 tablet by mouth Daily.   • doxazosin (CARDURA) 4 MG tablet Take 1 tablet by mouth Every Night.   • Dulaglutide 1.5 MG/0.5ML solution pen-injector Inject  under the skin into the appropriate area as directed.   • famotidine (PEPCID) 20 MG tablet Take 1 tablet by mouth 2 (Two) Times a Day.   • Insulin Glargine, 2 Unit Dial, (Toujeo Max SoloStar) 300 UNIT/ML solution pen-injector injection Inject 30 Units under the skin into the appropriate area as directed Daily.   • ondansetron (ZOFRAN) 8 MG tablet Take  by mouth Every 8 (Eight) Hours As Needed for Nausea or Vomiting.   • ONETOUCH VERIO test strip Use to test BG 3 times daily. DX Code: E11.9   • pantoprazole (PROTONIX) 40 MG EC tablet Take 1 tablet by mouth Daily.   • predniSONE (DELTASONE) 10 MG tablet Take 1 tablet by mouth Every 14 (Fourteen) Days.   • ranitidine (ZANTAC) 150 MG capsule Take 1 capsule by mouth Daily.   • sacubitril-valsartan (Entresto) 24-26 MG tablet Take 1 tablet by mouth 2 (Two) Times a Day.   • spironolactone (ALDACTONE) 25 MG tablet Take 1 tablet by mouth Daily.   • tamsulosin (FLOMAX) 0.4 MG capsule 24 hr capsule Take 1 capsule by mouth Daily.   • TRESIBA FLEXTOUCH 200 UNIT/ML solution pen-injector Inject 80 Units under the skin into the appropriate area as directed Daily.   • [DISCONTINUED] colchicine 0.6 MG tablet Take 1 tablet by mouth Daily.  "(Patient not taking: Reported on 4/19/2023)   • [DISCONTINUED] Cyanocobalamin 1000 MCG/ML kit Inject as directed every 30 (thirty) days. (Patient not taking: Reported on 3/29/2023)   • [DISCONTINUED] levothyroxine (SYNTHROID, LEVOTHROID) 112 MCG tablet Take 100 mcg by mouth Daily. (Patient not taking: Reported on 3/29/2023)   • [DISCONTINUED] linaclotide (LINZESS) 145 MCG capsule capsule Take 1 capsule by mouth Every Morning Before Breakfast. (Patient not taking: Reported on 3/29/2023)   • [DISCONTINUED] metFORMIN (GLUCOPHAGE) 1000 MG tablet Take 1,000 mg by mouth 2 (Two) Times a Day With Meals. (Patient not taking: Reported on 3/29/2023)   • [DISCONTINUED] multivitamin (THERAGRAN) tablet tablet Take  by mouth Daily. (Patient not taking: Reported on 4/19/2023)     No current facility-administered medications on file prior to visit.       Objective   Vitals:    04/19/23 1040   BP: 94/67   Pulse: 78   Weight: 102 kg (225 lb 9.6 oz)   Height: 185.4 cm (73\")       Physical Exam  Constitutional:       General: He is awake. He is not in acute distress.     Appearance: Normal appearance.   HENT:      Head: Normocephalic.      Nose: Nose normal. No congestion.   Eyes:      Extraocular Movements: Extraocular movements intact.      Conjunctiva/sclera: Conjunctivae normal.      Pupils: Pupils are equal, round, and reactive to light.   Neck:      Thyroid: No thyromegaly.      Vascular: No JVD.   Cardiovascular:      Rate and Rhythm: Normal rate and regular rhythm.      Chest Wall: PMI is not displaced.      Pulses: Normal pulses.      Heart sounds: Normal heart sounds, S1 normal and S2 normal. No murmur heard.    No friction rub. No gallop. No S3 or S4 sounds.   Pulmonary:      Effort: Pulmonary effort is normal.      Breath sounds: Normal breath sounds. No wheezing, rhonchi or rales.   Abdominal:      General: Bowel sounds are normal.      Palpations: Abdomen is soft.      Tenderness: There is no abdominal tenderness. "   Musculoskeletal:      Cervical back: No tenderness.      Right lower leg: No edema.      Left lower leg: No edema.   Lymphadenopathy:      Cervical: No cervical adenopathy.   Skin:     General: Skin is warm and dry.      Capillary Refill: Capillary refill takes less than 2 seconds.      Coloration: Skin is not cyanotic.      Findings: No petechiae or rash.      Nails: There is no clubbing.   Neurological:      Mental Status: He is alert.   Psychiatric:         Mood and Affect: Mood normal.         Behavior: Behavior is cooperative.           Result Review     The following data was reviewed by LOIS Chong on 04/19/23.    No results found for: PROBNP  CMP        9/15/2022    11:49   CMP   Glucose 325     BUN 14     Creatinine 0.80     EGFR 100.1     Sodium 139     Potassium 4.3     Chloride 105     Calcium 9.7     Total Protein 6.2     Albumin 4.10     Globulin 2.1     Total Bilirubin 0.2     Alkaline Phosphatase 122     AST (SGOT) 19     ALT (SGPT) 17     Albumin/Globulin Ratio 2.0     BUN/Creatinine Ratio 17.5     Anion Gap 11.0              Results for orders placed in visit on 09/27/22    Adult Transthoracic Echo Complete W/ Cont if Necessary Per Protocol    Interpretation Summary  · Left ventricular ejection fraction appears to be 41 - 45%.  · Left ventricular diastolic function is consistent with (grade I) impaired relaxation.  · The following left ventricular wall segments are hypokinetic: mid anterior, apical septal and mid anteroseptal.  · No hemodynamically significant valvular pathology.    Results for orders placed during the hospital encounter of 09/22/22    Stress Test With Myocardial Perfusion One Day    Interpretation Summary  · Left ventricular ejection fraction is moderately reduced. (Calculated EF = 36%).  · Findings consistent with an equivocal ECG stress test.  · Diaphragmatic attenuation artifact is present.  · Myocardial perfusion imaging indicates a medium-sized infarct located  in the anterior wall and septal wall with no significant ischemia noted.  · Impressions are consistent with a low risk study.           Assessment & Plan  Diagnoses and all orders for this visit:    1. Coronary artery disease of native artery of native heart with stable angina pectoris (Primary)    2. Chronic combined systolic and diastolic congestive heart failure    3. Essential hypertension    4. Hyperlipidemia LDL goal <70    5. Peripheral vascular disease, unspecified    6. Chest pain, atypical    1.Taking medications as instructed without side effects. Continue with current management.  2.Continue GDMT for HFrEF. Entresto will be cut in half as his blood pressure is a little on the low side and he is complaining of some dizziness and lightheadedness.  3.Likely overtreated and on the low side, his entresto will be cut in half. We will see him back for BP only.  4.Lipid abnormalities are controlled. Target LDL for this patient is <70. Explained to him the respective contributions of genetics, diet, and exercise to lipid levels and encouraged healthy diet and routine aerobic exercise. Continue current medications.  5.Recent exercise WYATT was negative for claudication. Continue to monitor clinically.  6.His chest pain is atypical and recent stress testing was low risk. Continue to monitor clinically.        Follow Up   No follow-ups on file.    Patient was given instructions and counseling regarding his condition or for health maintenance advice. Please see specific information pulled into the AVS if appropriate.     Charley Martin, APRN  04/19/23  10:41 EDT    Dictated Utilizing Dragon Dictation

## 2023-04-28 ENCOUNTER — HOSPITAL ENCOUNTER (OUTPATIENT)
Dept: NUCLEAR MEDICINE | Facility: HOSPITAL | Age: 63
Discharge: HOME | End: 2023-04-28
Payer: MEDICARE

## 2023-04-28 DIAGNOSIS — R11.2 NAUSEA AND VOMITING, UNSPECIFIED VOMITING TYPE: ICD-10-CM

## 2023-04-28 PROCEDURE — A9541 TC99M SULFUR COLLOID: HCPCS | Performed by: NURSE PRACTITIONER

## 2023-04-28 PROCEDURE — 0 TECHNETIUM SULFUR COLLOID: Performed by: NURSE PRACTITIONER

## 2023-04-28 PROCEDURE — 78264 GASTRIC EMPTYING IMG STUDY: CPT

## 2023-04-28 RX ADMIN — TECHNETIUM TC 99M SULFUR COLLOID 1 DOSE: KIT at 07:40

## 2023-05-02 ENCOUNTER — OFFICE VISIT (OUTPATIENT)
Dept: SLEEP MEDICINE | Facility: HOSPITAL | Age: 63
End: 2023-05-02
Payer: MEDICARE

## 2023-05-02 VITALS
DIASTOLIC BLOOD PRESSURE: 85 MMHG | HEIGHT: 73 IN | BODY MASS INDEX: 29.09 KG/M2 | WEIGHT: 219.5 LBS | SYSTOLIC BLOOD PRESSURE: 120 MMHG | HEART RATE: 65 BPM | OXYGEN SATURATION: 99 %

## 2023-05-02 DIAGNOSIS — E66.3 OVERWEIGHT (BMI 25.0-29.9): ICD-10-CM

## 2023-05-02 DIAGNOSIS — I10 HYPERTENSION, UNSPECIFIED TYPE: ICD-10-CM

## 2023-05-02 DIAGNOSIS — R06.83 SNORING: ICD-10-CM

## 2023-05-02 DIAGNOSIS — G47.33 OSA (OBSTRUCTIVE SLEEP APNEA): ICD-10-CM

## 2023-05-02 DIAGNOSIS — I50.42 CHRONIC COMBINED SYSTOLIC AND DIASTOLIC CONGESTIVE HEART FAILURE: Primary | ICD-10-CM

## 2023-05-02 PROCEDURE — G0463 HOSPITAL OUTPT CLINIC VISIT: HCPCS

## 2023-05-02 NOTE — PROGRESS NOTES
[address] [address]  Phone: [phone]  Fax: [phone]        Narciso Mejía  [id number]   1960  63 y.o.  male      PCP:Marco Nicholson MD    Type of service: Initial New Patient Office Visit  Date of service: 5/2/2023      CHIEF COMPLAINT: Suspected sleep apnea      HISTORY OF PRESENT ILLNESS:  Narciso Mejía 63 y.o.  presents to the clinic today as a new patient to me and was seen for sleep-related problems of sleep apnea.  Patient follows with cardiology for CAD with history of stenting, chronic combined systolic and CHF, hypertension, PVD, palpitations.  Referral to sleep medicine was recommended due to sleep disordered breathing. Sleep study in 2008 showed obstructive sleep apnea with RDI of 40.8/h.  He continues to have significant symptoms sleep apnea including snoring, nonrestorative sleep, excessive daytime fatigue.  He reports he used CPAP for years.  Tolerated full face mask best.  Machine was too loud in the past.         PATIENT HISTORY:  Sleep schedule:  Bedtime: 8 to 9 PM  Wake time: 230 to 3 AM  Normally takes about 5 minutes to fall asleep  Average hours of sleep: 6.5-7  Number of naps per day: 1-3    Symptoms:  In addition to the above, patient reports:   Have you ever awakened gasping for breath, coughing, choking: Yes   Change in weight:  Yes   Morning headaches:  Yes   Awaken with a sore throat or dry mouth:  Yes   Leg jerking at night: Not worse at night.  Not legs, entire body when about to fall asleep.    Crawly feeling/urge sensation to move in the legs: Not worse at night.  Discuss with PCP.  Hx PAD.   Teeth grinding: Yes   Have you ever awakened at night with a sour taste or burning sensation in your chest:  Yes   Do you have muscle weakness with laughing or anger:  No   Have you ever felt paralyzed while going to sleep or waking up:  Yes   Sleepwalking: No   Nightmares: No   Nocturia (urination at night): ?? times  "per night  Memory Problem: No     Past medical history: (Relevant to sleep medicine)  1. Coronary artery disease  2. Hypertension  3. Hyperlipidemia  4. Peripheral vascular disease  5. Chronic combined systolic and diastolic CHF  6. Diabetes  7. Chronic pain      Social history:  Do you drive a commercial vehicle:  No   Shift work:  No   Tobacco use:  Yes, former  Alcohol use:  0 per week  Caffeinated drinks: 2-3 per day  Occupation: Retired /      Family history (parents, siblings, children) (relevant to sleep medicine):  1. Sleep apnea  2. Narcolepsy  3. Restless legs  4. Insomnia  5. Obesity  6. Thyroid disorder  7. Sleepwalking/sleep terrors    Medications: reviewed     ALLERGIES: Penicillins, Bacitracin-neomycin-polymyxin, A-d-dimethicone-e-zno [dimethicone-zinc oxide], Bydureon [exenatide], and Bacitracin        REVIEW OF SYSTEMS:  Full review of systems available on the intake form which is scanned in the media tab.  The relevant positives are noted below:  1. Daytime excessive sleepiness with [place] Sleepiness Scale: Total score: 20   2. Snoring  3. Neck pain  4. Anxiety  5. Depression  6. Heartburn  7. Abdominal bloating  8. Voice hoarseness        PHYSICAL EXAM:  Vitals:    05/02/23 1300   BP: 120/85   Pulse: 65   SpO2: 99%   Weight: 99.6 kg (219 lb 8 oz)   Height: 185.4 cm (73\")    Body mass index is 28.96 kg/m². Neck Circumference: 16 inches  HEAD: atraumatic, normocephalic  NOSE: nasal passages are clear  THROAT: tonsils are surgically absent, Mallampati class III and narrow  NECK: Neck Circumference: 16 inches, trachea is in the midline  RESPIRATORY SYSTEM: Lung sounds clear, no wheezes noted  CARDIOVASULAR SYSTEM: Heart sounds are regular rhythm and normal rate, no edema  EXTREMITES: No cyanosis or clubbing  NEUROLOGICAL SYSTEM: Alert and oriented x 3, no gross motor defects, gait normal    Office notes from care team reviewed. Office note dated 2/10/2023, " reviewed.      Labs reviewed.  TSH Results:  TSH        9/15/2022    11:49   TSH   TSH 0.014        Most Recent A1C        2/23/2023    07:40   HGBA1C Most Recent   Hemoglobin A1C 11.4            This result is from an external source.              ASSESSMENT AND PLAN:   · Obstructive sleep apnea: patient's symptoms and physical examination are consistent with sleep apnea (G47.30).   I discussed the signs, symptoms, and pathophysiology of sleep apnea with this patient.  I also discussed the potential complications of untreated sleep apnea including but not limited to resistant hypertension, insulin resistance, pulmonary hypertension, atrial fibrillation, stroke, nonrestorative sleep with hypersomnia which can increase risk for motor vehicle accidents, etc.   Different testing methods including home-based and lab based sleep studies were discussed with this patient.   Based on patient history and physical examination, the most appropriate study is in-lab polysomnogram.  The order for the sleep study is placed in [facility].  The test will be scheduled after prior authorization has been obtained through patient's insurance.  Treatment and management will be discussed in more detail with this patient after the test is completed.  All questions were answered to patient's satisfaction.   · Snoring (R06.83): snoring is the sound created by turbulent airflow vibrating upper airway soft tissue.  I have also discussed factors affecting snoring including sleep deprivation, sleeping on the back and alcohol ingestion. To minimize snoring, patient is advised to have adequate sleep, sleep on their side, and avoid alcohol and sedative medications around bedtime.   · Excessive daytime sleepiness: [place] Sleepiness Scale of Total score: 20.  There are many causes of excessive daytime sleepiness including but not limited to sleep apnea, shiftwork syndrome, depression, and other medical disorders such as heart disease, kidney disease, and  liver failure.  The most serious cause of excessive sleepiness is due to neurological conditions such as narcolepsy/cataplexy.  More commonly excessive sleepiness is caused by sleep apnea with frequent awakenings during sleep time.  I have discussed safety of driving and to remain vigilant while driving.  · Overweight: Body mass index is 28.96 kg/m².. Patients who are overweight or obese are at increased risk of sleep apnea/ sleep disordered breathing. Weight reduction and healthy lifestyle are encouraged in overweight/ obese patients as part of a comprehensive approach to sleep apnea treatment.    · Chronic combined systolic and diastolic CHF  · Hypertension  · CAD with history of revascularization  · Peripheral vascular disease    I have also discussed the following:  • Sleep hygiene: try to maintain a regular bed time and wake time, avoid watching TV/ using electronic devices in bed (including cell phones), limit caffeinated and alcoholic beverages before bed, try to maintain a cool and quiet sleep environment, avoid daytime naps  • Adequate amount of sleep: most people need around 7 to 8 hours of sleep each night        Patient will follow-up after study, 31-90 days after PAP initiated if applicable, or sooner for issues or concerns.  Patient's questions were answered.        Thank you for allowing me to participate in the care of this patient.    Ellyn Gautam DNP, [facility]

## 2023-05-17 ENCOUNTER — TELEPHONE (OUTPATIENT)
Dept: GASTROENTEROLOGY | Facility: CLINIC | Age: 63
End: 2023-05-17
Payer: MEDICARE

## 2023-05-17 NOTE — TELEPHONE ENCOUNTER
Pt called and left vm states he has some questions regarding GES. I tried to return pt's call, no answer but left message for pt to call office.

## 2023-06-07 ENCOUNTER — TELEPHONE (OUTPATIENT)
Dept: CARDIOLOGY | Facility: CLINIC | Age: 63
End: 2023-06-07
Payer: MEDICARE

## 2023-06-07 NOTE — TELEPHONE ENCOUNTER
----- Message from Mellissa Lewis MA sent at 6/7/2023  8:00 AM EDT -----  Regarding: FW: Question regarding DOPPLER ARTERIAL LOWER EXTREMITY STRESS  Contact: [phone]    ----- Message -----  From: Narciso Mejía  Sent: 6/6/2023   4:14 PM EDT  To: [facility]  Subject: Question regarding DOPPLER ARTERIAL LOWER EX#    Ok then why do my legs give out mid day no exercise  no heavy lifting  no heavy  hard labor  just normal activities  normal  walking then I'm done

## 2023-06-08 ENCOUNTER — HOSPITAL ENCOUNTER (OUTPATIENT)
Dept: SLEEP MEDICINE | Facility: HOSPITAL | Age: 63
End: 2023-06-08
Payer: MEDICARE

## 2023-06-08 DIAGNOSIS — E66.3 OVERWEIGHT (BMI 25.0-29.9): ICD-10-CM

## 2023-06-08 DIAGNOSIS — G47.33 OSA (OBSTRUCTIVE SLEEP APNEA): ICD-10-CM

## 2023-06-08 DIAGNOSIS — I10 HYPERTENSION, UNSPECIFIED TYPE: ICD-10-CM

## 2023-06-08 DIAGNOSIS — I50.42 CHRONIC COMBINED SYSTOLIC AND DIASTOLIC CONGESTIVE HEART FAILURE: ICD-10-CM

## 2023-06-08 DIAGNOSIS — R06.83 SNORING: ICD-10-CM

## 2023-06-08 PROCEDURE — 95810 POLYSOM 6/> YRS 4/> PARAM: CPT

## 2023-06-08 PROCEDURE — 95810 POLYSOM 6/> YRS 4/> PARAM: CPT | Performed by: INTERNAL MEDICINE

## 2023-06-12 ENCOUNTER — TELEPHONE (OUTPATIENT)
Dept: SLEEP MEDICINE | Facility: HOSPITAL | Age: 63
End: 2023-06-12
Payer: MEDICARE

## 2023-06-12 DIAGNOSIS — G47.33 OSA (OBSTRUCTIVE SLEEP APNEA): Primary | ICD-10-CM

## 2023-06-12 DIAGNOSIS — I50.42 CHRONIC COMBINED SYSTOLIC AND DIASTOLIC CONGESTIVE HEART FAILURE: ICD-10-CM

## 2023-06-12 DIAGNOSIS — I10 HYPERTENSION, UNSPECIFIED TYPE: ICD-10-CM

## 2023-06-12 DIAGNOSIS — R06.83 SNORING: ICD-10-CM

## 2023-06-16 ENCOUNTER — TELEPHONE (OUTPATIENT)
Dept: CARDIOLOGY | Facility: CLINIC | Age: 63
End: 2023-06-16
Payer: MEDICARE

## 2023-06-16 NOTE — TELEPHONE ENCOUNTER
Procedure: Colonoscopy and/or EGD    Medication Directive: NA    PMH: CAD, HLD, HTN    Last Seen: 04/19/23    Stress Test: 09/22/22     Left ventricular ejection fraction is moderately reduced. (Calculated EF = 36%).  · Findings consistent with an equivocal ECG stress test.  · Diaphragmatic attenuation artifact is present.  · Myocardial perfusion imaging indicates a medium-sized infarct located in the anterior wall and septal wall with no significant ischemia noted.  · Impressions are consistent with a low risk study.

## 2023-06-19 ENCOUNTER — ANESTHESIA (OUTPATIENT)
Dept: GASTROENTEROLOGY | Facility: HOSPITAL | Age: 63
End: 2023-06-19
Payer: MEDICARE

## 2023-06-19 ENCOUNTER — ANESTHESIA EVENT (OUTPATIENT)
Dept: GASTROENTEROLOGY | Facility: HOSPITAL | Age: 63
End: 2023-06-19
Payer: MEDICARE

## 2023-06-19 PROCEDURE — 25010000002 PROPOFOL 10 MG/ML EMULSION

## 2023-06-19 RX ORDER — PROPOFOL 10 MG/ML
VIAL (ML) INTRAVENOUS AS NEEDED
Status: DISCONTINUED | OUTPATIENT
Start: 2023-06-19 | End: 2023-06-19 | Stop reason: SURG

## 2023-06-19 RX ADMIN — PROPOFOL 180 MCG/KG/MIN: 10 INJECTION, EMULSION INTRAVENOUS at 11:10

## 2023-06-19 RX ADMIN — PROPOFOL 100 MG: 10 INJECTION, EMULSION INTRAVENOUS at 11:08

## 2023-06-19 NOTE — ANESTHESIA POSTPROCEDURE EVALUATION
Patient: Narciso Mejía    Procedure Summary     Date: 06/19/23 Room / Location: [facility] ENDOSCOPY 4 / [facility] ENDOSCOPY    Anesthesia Start: 1106 Anesthesia Stop: 1128    Procedure: ESOPHAGOGASTRODUODENOSCOPY WITH BIOPSIES, BALLOON DILATATION 18-20 Diagnosis:       Esophageal dysphagia      Nausea and vomiting, unspecified vomiting type      Chronic idiopathic constipation      History of colon polyps      (Esophageal dysphagia [R13.19])      (Nausea and vomiting, unspecified vomiting type [R11.2])      (Chronic idiopathic constipation [K59.04])      (History of colon polyps [Z86.010])    Surgeons: Landry Elizabeth MD Provider: Russell Crawford MD    Anesthesia Type: general ASA Status: 3          Anesthesia Type: general    Vitals  Vitals Value Taken Time   /84 06/19/23 1140   Temp 35.8 °C (96.4 °F) 06/19/23 1125   Pulse 74 06/19/23 1140   Resp 12 06/19/23 1140   SpO2 95 % 06/19/23 1140           Post Anesthesia Care and Evaluation    Patient location during evaluation: bedside  Patient participation: complete - patient participated  Level of consciousness: awake  Pain management: adequate    Airway patency: patent  PONV Status: none  Cardiovascular status: acceptable and stable  Respiratory status: acceptable  Hydration status: acceptable    Comments: An Anesthesiologist personally participated in the most demanding procedures (including induction and emergence if applicable) in the anesthesia plan, monitored the course of anesthesia administration at frequent intervals and remained physically present and available for immediate diagnosis and treatment of emergencies.

## 2023-06-19 NOTE — ANESTHESIA PREPROCEDURE EVALUATION
Anesthesia Evaluation     Patient summary reviewed and Nursing notes reviewed   no history of anesthetic complications:  NPO Solid Status: > 8 hours  NPO Liquid Status: > 2 hours           Airway   Mallampati: II  TM distance: >3 FB  Neck ROM: full  No difficulty expected  Dental      Pulmonary - normal exam    breath sounds clear to auscultation  (+) asthma,sleep apnea,   Cardiovascular - normal exam  Exercise tolerance: good (4-7 METS)    Rhythm: regular  Rate: normal    (+) hypertension, past MI  >12 months, CAD, CHF , PVD, hyperlipidemia,     ROS comment: Recent neg cardiac w/u, reduced EF ~ 40 %    Neuro/Psych  (+) headaches, psychiatric history Depression,    GI/Hepatic/Renal/Endo    (+)  hiatal hernia, GERD well controlled, GI bleeding resolved, diabetes mellitus type 2, thyroid problem hypothyroidism    Musculoskeletal (-) negative ROS    Abdominal    Substance History - negative use     OB/GYN negative ob/gyn ROS         Other - negative ROS       ROS/Med Hx Other: PAT Nursing Notes unavailable.                   Anesthesia Plan    ASA 3     general     (Total IV Anesthesia    Patient understands anesthesia not responsible for dental damage.  Nauseated in preop, given zofran  )  intravenous induction     Anesthetic plan, risks, benefits, and alternatives have been provided, discussed and informed consent has been obtained with: patient.  Pre-procedure education provided  Plan discussed with CRNA.        CODE STATUS:

## 2023-09-11 ENCOUNTER — OFFICE VISIT (OUTPATIENT)
Dept: SLEEP MEDICINE | Facility: HOSPITAL | Age: 63
End: 2023-09-11
Payer: MEDICARE

## 2023-09-11 VITALS — OXYGEN SATURATION: 96 % | WEIGHT: 230.3 LBS | HEART RATE: 77 BPM | HEIGHT: 73 IN | BODY MASS INDEX: 30.52 KG/M2

## 2023-09-11 DIAGNOSIS — G47.33 OSA (OBSTRUCTIVE SLEEP APNEA): Primary | ICD-10-CM

## 2023-09-11 DIAGNOSIS — E66.9 CLASS 1 OBESITY WITH BODY MASS INDEX (BMI) OF 30.0 TO 30.9 IN ADULT, UNSPECIFIED OBESITY TYPE, UNSPECIFIED WHETHER SERIOUS COMORBIDITY PRESENT: ICD-10-CM

## 2023-09-11 PROCEDURE — G0463 HOSPITAL OUTPT CLINIC VISIT: HCPCS

## 2023-09-11 NOTE — PROGRESS NOTES
"  [address] [address]  Phone: [phone]  Fax: [phone]       SLEEP CLINIC FOLLOW-UP PROGRESS NOTE    Narciso Mejía  [id number]   1960  63 y.o.  male      PCP: Marco Nicholson MD    DATE OF VISIT: 9/11/2023          CHIEF COMPLAINT: Obstructive sleep apnea    HPI:  This is a 63 y.o. year old patient who presents to the clinic today for the management of obstructive sleep apnea.  Patient had a in lab polysomnogram sleep study 6/2023 showing mild obstructive sleep apnea with AHI of 6.1/hr.  This patient is using positive airway pressure therapy with auto CPAP.  Patient's sleep/wake times vary.  Usually gets 6 to 7 hours of sleep per night.  CPAP has been an adjustment.  No complaints about pressures. Sometimes takes off PAP at night without realizing.  Discussed leak alarm.  Increased usage and improvement in AHI over last week.  Having issues with dry mouth.  We discussed trying chinstrap.  We discussed adjusting humidifier as needed but to decrease if he develops condensation or rain.  Some mask leak if rolls over onto side. We did discuss CPAP pillow.          MEDICATIONS: reviewed     ALLERGIES:  Alpha-gal, Penicillins, A-d-dimethicone-e-zno [dimethicone-zinc oxide], Bacitracin, Bacitracin-neomycin-polymyxin, and Bydureon [exenatide]    SOCIAL HISTORY (habits pertaining to sleep medicine):  History tobacco use: No   History of alcohol use: 0 per week  Caffeine use: 4     REVIEW OF SYSTEMS:   Pertinent positive symptoms are:  Wolf Sleepiness Scale :Total score: 21   Nasal congestion  Dyspnea  Heartburn  Abdominal bloating  Anxiety /depression        PHYSICAL EXAMINATION:  CONSTITUTIONAL:  Vitals:    09/11/23 0900   Pulse: 77   SpO2: 96%   Weight: 104 kg (230 lb 4.8 oz)   Height: 185.4 cm (72.99\")    Body mass index is 30.39 kg/m².   HEAD: atraumatic, normocephalic  RESP SYSTEM: not in respiratory distress, breathing " unlabored  CARDIOVASULAR: normal rate, no edema noted   NEURO: Alert and oriented x 3, mood and affect appeared appropriate      DATA REVIEWED:  The Smart card downloaded on 9/5/2023 has been reviewed independently by me for compliance and discussed the data with the patient.   Compliance: 83%  More than 4 hr use: 63%  Average use of the device: 4 hours 27 minutes per night  Residual AHI: 9.4 /hr (goal < 5.0 /hr)--- of note, AHI for past 7 days has improved to 6.1/hr.    Mask type: Fullface  Device: ResMed air sense 11 auto CPAP  DME: [facility]          ASSESSMENT AND PLAN:  Obstructive Sleep Apnea (G 47.33): Patient's compliance is increasing.  Over the last week he has been able to use his machine more than 4 hours at least 71% of the time.  AHI improved from 9.4/h to 6.1/h over the last week.  Address mask leak.  Continue to try to increase usage and use all night every night if possible.  We will follow-up with patient in approximately 8 to 12 weeks to ensure it continues to improve.  He will call sooner for issues or concerns.  The device is medically necessary.  Without adequate treatment of sleep apnea and without good compliance, patient would be at increased risk of hypertension, diabetes mellitus, pulmonary hypertension, atrial fibrillation, stroke, and nonrestorative sleep with hypersomnia which could increase risk of motor vehicle accidents. The patient is also instructed to get the supplies from the [employer] and and change them on a regular basis.  A prescription for supplies has been sent to the [facility].  I have also discussed with this patient the importance of good sleep hygiene and getting an adequate amount of sleep to aid in overall good health.   Obesity: Body mass index is 30.39 kg/m².. Patients who are overweight or obese are at increased risk of sleep apnea/ sleep disordered breathing. Weight reduction and healthy lifestyle are encouraged in overweight/ obese patients as part of a  comprehensive approach to sleep apnea treatment.    Chronic combined systolic and diastolic CHF  Hypertension  CAD with history of revascularization  Peripheral vascular disease      Patient will follow-up in 8-12 WEEKS or follow-up sooner for any issues or concerns.  Patient's questions were answered.          Thank you for allowing me to participate in the care of this patient.     Ellyn Gautam DNP, APRN  [facility]

## 2023-10-13 ENCOUNTER — TELEPHONE (OUTPATIENT)
Dept: GASTROENTEROLOGY | Facility: CLINIC | Age: 63
End: 2023-10-13
Payer: MEDICARE

## 2023-11-02 DIAGNOSIS — I50.42 CHRONIC COMBINED SYSTOLIC AND DIASTOLIC CONGESTIVE HEART FAILURE: ICD-10-CM

## 2023-11-02 RX ORDER — SACUBITRIL AND VALSARTAN 24; 26 MG/1; MG/1
1 TABLET, FILM COATED ORAL 2 TIMES DAILY
Qty: 60 TABLET | Refills: 0 | OUTPATIENT
Start: 2023-11-02

## 2023-11-21 ENCOUNTER — OFFICE VISIT (OUTPATIENT)
Dept: GASTROENTEROLOGY | Facility: CLINIC | Age: 63
End: 2023-11-21
Payer: MEDICARE

## 2023-11-21 VITALS
BODY MASS INDEX: 28.1 KG/M2 | HEART RATE: 78 BPM | WEIGHT: 212 LBS | HEIGHT: 73 IN | DIASTOLIC BLOOD PRESSURE: 75 MMHG | SYSTOLIC BLOOD PRESSURE: 123 MMHG

## 2023-11-21 DIAGNOSIS — K44.9 HIATAL HERNIA: ICD-10-CM

## 2023-11-21 DIAGNOSIS — K59.04 CHRONIC IDIOPATHIC CONSTIPATION: ICD-10-CM

## 2023-11-21 DIAGNOSIS — R13.19 ESOPHAGEAL DYSPHAGIA: Primary | ICD-10-CM

## 2023-11-21 DIAGNOSIS — K22.2 SCHATZKI'S RING: ICD-10-CM

## 2023-11-21 DIAGNOSIS — Z86.010 HISTORY OF COLON POLYPS: ICD-10-CM

## 2023-11-21 RX ORDER — PARSLEY 450 MG
CAPSULE ORAL
COMMUNITY

## 2023-11-21 RX ORDER — METOPROLOL SUCCINATE 25 MG/1
25 TABLET, EXTENDED RELEASE ORAL DAILY
COMMUNITY
Start: 2023-09-05 | End: 2024-08-30

## 2023-11-21 RX ORDER — METFORMIN HYDROCHLORIDE 500 MG/1
1000 TABLET, EXTENDED RELEASE ORAL
COMMUNITY
Start: 2023-08-02

## 2023-11-21 RX ORDER — TOPIRAMATE 100 MG/1
1 TABLET, FILM COATED ORAL EVERY 12 HOURS SCHEDULED
COMMUNITY
Start: 2023-09-05

## 2023-11-21 RX ORDER — PRUCALOPRIDE 2 MG/1
2 TABLET, FILM COATED ORAL DAILY
Qty: 30 TABLET | Refills: 3 | Status: SHIPPED | OUTPATIENT
Start: 2023-11-21

## 2023-11-21 RX ORDER — FOLIC ACID 0.8 MG
TABLET ORAL EVERY 24 HOURS
COMMUNITY

## 2023-11-21 RX ORDER — LEVOTHYROXINE SODIUM 0.1 MG/1
100 TABLET ORAL DAILY
COMMUNITY

## 2023-11-21 NOTE — PROGRESS NOTES
Patient Name: Narciso Mejía   Visit Date: 11/21/2023   Patient ID: [id number]  Provider: LOIS Sky    Sex: male  Location:  Location Address:  Location Phone: [address]  [place] [address] [phone]    YOB: 1960  Age: 63 y.o.   Primary Care Provider Marco Nicholson MD      Referring Provider: No ref. provider found        Chief Complaint  Difficulty Swallowing (Difficulty with solids, usually dry foods ), Abdominal Pain (Upper ABD pain intermittent daily, burning sensation in center ABD constant, worse before meals ), Vomiting (1 episode weekly ), and Nausea (Daily )    History of Present Illness    We have been seeing pt intermittently since 2015. He has a hx of rectal pain, dysphagia, elev alk phos w neg. AMA 2017, neg GGT 2017.   Pt reports in 2005 Dr Mclaughlin at [facility] performed hiatal hernia repair. Hx abd hernia repair w infection 2007. States he's had multiple abdominal surgeries.   Last EGD 2015 - + large HH, otherwise neg.  Last Colonoscopy 2018: diffuse melanosis--bx c/w melanosis coli, 5 mm adenomatous polyp removed from sigmoid. +int. hemorrhoids     Patient was last seen 3/29/2023 reporting he continued to have dysphagia feels it started after last EGD in 2015, Protonix working well for heartburn.  Complained of nausea and vomiting.  Struggling with constipation, reported primary care trying to get Linzess 290 covered for him.  EGD/COLONOSCOPY ordered    Gastric emptying scan 4/28/2023: Normal at 3 hours with 8% remaining  EGD 6/19/2023: Large hiatal hernia, Schatzki's ring found with dilation performed, normal esophagus-biopsy negative, normal stomach, normal duodenum   Pt states he drank all of prep and did not have 1 BM     Pt states he's tried Linzess samples but it doesn't really work well. Has combined Miralax w OTC laxatives and Linzess and still w little results. .  Last BM 3 days ago - took 3 OTC laxative and Miralax. No blood in stool.   Pt states he  "still has dysphagia, q meal. Requires extra liquids and has to focus on eating and swallowing, small bites. Didn't really notice improvement after EGD w dilation  Pt states he has a \"burning, churning pain all of the time\" worse in the evening.   Hasn't had as much n/v. Aware Dulaglutide contributes to nausea as he tends to have day of injection, and for 3 days after.     Past Medical History:   Diagnosis Date    Allergy to alpha-gal     Anemia     Angina pectoris     Anxiety and depression     Arthritis 2000    Asthma     Bipolar disorder 2008    Broken bones     Bronchitis     Bulging lumbar disc     CAD (coronary artery disease)     Crohn's colitis     Deafness 2010    Depression     Deviated nasal septum     Diabetes mellitus     Disease of thyroid gland     Dysphagia     Erectile dysfunction 04/23/2018    Forgetfulness 2008    GERD (gastroesophageal reflux disease)     Gout 2007    H/O ulceration     Ulcer    Heart attack     Hemorrhoids 2000    Hiatal hernia     History of sleep walking     HLD (hyperlipidemia)     High Cholesterol    Hypertension     Hypoparathyroidism     Hypothyroidism     Irritant contact dermatitis due to concrete     CONCRETE POISONING    Leg pain     Limb swelling     Liver disease     Migraines     Muscle cramps     Muscular dystrophy     Myocardial infarction     x 2    Nasal obstruction     Nausea and vomiting 03/29/2023    Neurologic disorder     Unspecified    Night sweat     LYNNETTE (obstructive sleep apnea)     Peptic ulceration     Perineal pain in male 05/24/2018    Pituitary gland disorder     Pneumonia     Rectal bleeding     RLS (restless legs syndrome)     Seasonal allergies     Sinus trouble     Skin disease 2000    Skin Disease/Psoriasis/eczema    Testosterone deficiency     Testosterone deficiency in male     Trouble swallowing     Vestibular migraine     Vitamin D deficiency        Past Surgical History:   Procedure Laterality Date    APPENDECTOMY  1976    CARPAL TUNNEL " RELEASE      COLONOSCOPY      CORONARY ANGIOPLASTY WITH STENT PLACEMENT      ENDOSCOPY      ENDOSCOPY N/A 2023    Procedure: ESOPHAGOGASTRODUODENOSCOPY WITH BIOPSIES, BALLOON DILATATION 18-20;  Surgeon: Landry Elizabeth MD;  Location: [facility] ENDOSCOPY;  Service: Gastroenterology;  Laterality: N/A;  hiatal hernia, SCHATZKIS RING    ESOPHAGUS SURGERY      HERNIA REPAIR      Umbilical    NASAL SEPTUM SURGERY  2018    Dr. Pearl - Septoplasty with bilateral reduction of nasal turbinates    POLYPECTOMY      TONSILECTOMY, ADENOIDECTOMY, BILATERAL MYRINGOTOMY AND TUBES  1972       Allergies   Allergen Reactions    Alpha-Gal Hives, Itching and Swelling    Penicillins Anaphylaxis    A-D-Dimethicone-E-Zno [Dimethicone-Zinc Oxide] Hives    Bacitracin Itching and Rash    Bacitracin-Neomycin-Polymyxin Hives     Hives and blisters    Bydureon [Exenatide] Hives       Family History   Problem Relation Age of Onset    Stroke Mother     Heart disease Mother     Diabetes Mother         Diabetes Mellitus    Arthritis Mother     Narcolepsy Mother     Restless legs syndrome Mother     Periodic limb movement Mother     Insomnia Mother     Sleep walking Mother     Diabetes Father         Diabetes Mellitus    Arthritis Father     Asthma Father             Heart attack Father     Heart disease Father         Heart Failure    Heart disease Sister     Diabetes Sister         Diabetes Mellitus    Sleep apnea Sister     Heart disease Brother         Heart failure    Cancer Brother         Unspecified    Diabetes Brother         Diabetes Mellitus    Arthritis Brother     Sleep apnea Brother     Restless legs syndrome Brother     Colon cancer Maternal Uncle 71    Colon cancer Maternal Grandfather 90    Other Daughter         Renal Calculus        Social History     Tobacco Use    Smoking status: Former     Packs/day: 2.00     Years: 25.00     Additional pack years: 0.00     Total pack years: 50.00     Types: Cigarettes      "Start date: 1976     Quit date: 1999     Years since quittin.9    Smokeless tobacco: Never    Tobacco comments:     20 years ago   Vaping Use    Vaping Use: Never used   Substance Use Topics    Alcohol use: Yes     Comment: None since 23    Drug use: No       Objective     Vital Signs:   /75 (BP Location: Left arm, Patient Position: Sitting, Cuff Size: Adult)   Pulse 78   Ht 185.4 cm (72.99\")   Wt 96.2 kg (212 lb)   BMI 27.98 kg/m²       Physical Exam  Constitutional:       General: The patient is not in acute distress.     Appearance: Normal appearance.   HENT:      Head: Normocephalic and atraumatic.      Nose: Nose normal.   Pulmonary:      Effort: Pulmonary effort is normal. No respiratory distress.   Abdominal:      General: Abdomen is flat.      Palpations: Abdomen is soft. There is no mass.      Tenderness: There is no abdominal tenderness. There is no guarding.   Musculoskeletal:      Cervical back: Neck supple.      Right lower leg: No edema.      Left lower leg: No edema.   Skin:     General: Skin is warm and dry.   Neurological:      General: No focal deficit present.      Mental Status: The patient is alert and oriented to person, place, and time.      Gait: Gait normal.   Psychiatric:         Mood and Affect: Mood normal.         Speech: Speech normal.         Behavior: Behavior normal.         Thought Content: Thought content normal.     Result Review :   The following data was reviewed by: LOIS Sky on 2023:                      Assessment and Plan    Diagnoses and all orders for this visit:    1. Esophageal dysphagia (Primary)  -     FL Esophagram Complete Double-Contrast; Future  -     Cancel: Ambulatory Referral to Gastroenterology  -     Ambulatory Referral to Gastroenterology    2. Chronic idiopathic constipation  -     Cancel: Ambulatory Referral to Gastroenterology  -     Ambulatory Referral to Gastroenterology  -     Case Request; " Standing  -     Case Request    3. Hiatal hernia    4. Schatzki's ring  Comments:  s/p dilation    5. History of colon polyps  -     Case Request; Standing  -     Case Request    Other orders  -     Prucalopride Succinate (Motegrity) 2 MG tablet; Take 1 tablet by mouth Daily.  Dispense: 30 tablet; Refill: 3  -     polyethylene glycol (GoLYTELY) 236 g solution; Take per office instructions  Dispense: 4000 mL; Refill: 0  -     Follow Anesthesia Guidelines / Protocol; Standing  -     Obtain Informed Consent; Standing  -     Follow Anesthesia Guidelines / Protocol; Future  -     Obtain Informed Consent; Future  -     Verify NPO; Standing              Follow Up   Return in about 3 months (around 2/21/2024).  Check Esophagram   Trial Motegrity  Referral to Dr Perez r/t dysphagia and severe chronic constipation   Attempt Repeat colonoscopy - 2 d cl liquids     Patient was given instructions and counseling regarding his condition or for health maintenance advice. Please see specific information pulled into the AVS if appropriate.

## 2024-01-05 ENCOUNTER — TELEPHONE (OUTPATIENT)
Dept: GASTROENTEROLOGY | Facility: CLINIC | Age: 64
End: 2024-01-05
Payer: MEDICARE

## 2024-01-05 NOTE — TELEPHONE ENCOUNTER
Hub staff attempted to follow warm transfer process and was unsuccessful     Caller: Narciso Mejía    Relationship to patient: Self    Best call back number: [phone]    Patient is needing: PT IS RETURNING A MISS CALL FROM THE OFFICE. PLEASE GIVE PT A CALL BACK ASAP. IF NOT ABLE TO REACH PT. IT IS OKAY TO LEAVE AN VOICEMAIL. PT PROCEDURE IS ON 1/11/2024.

## 2024-01-05 NOTE — PRE-PROCEDURE INSTRUCTIONS
Attempted PAT call.  No answer.  Left message requesting return call.  Hold Trulicity for one week prior to procedure.

## 2024-01-08 NOTE — PRE-PROCEDURE INSTRUCTIONS
"Instructed on date and arrival time of 1230. Come to entrance \"C\". Must have  over age 18 to drive home.  May have two visitors; however, children under 12 must stay in waiting room.  Discussed clear liquid diet (no red or purple), bowel prep, and NPO.  May take medications as usual except for blood thinners, diabetic medications, and weight loss medications.  Bring list of medications.  Verbalized understanding of instructions given.  Instructed to call for questions or concerns.  Hold Ozempic for one week prior to procedure.  "

## 2024-01-11 ENCOUNTER — ANESTHESIA EVENT (OUTPATIENT)
Dept: GASTROENTEROLOGY | Facility: HOSPITAL | Age: 64
End: 2024-01-11
Payer: MEDICARE

## 2024-01-12 ENCOUNTER — HOSPITAL ENCOUNTER (OUTPATIENT)
Facility: HOSPITAL | Age: 64
Setting detail: HOSPITAL OUTPATIENT SURGERY
Discharge: HOME | End: 2024-01-12
Attending: INTERNAL MEDICINE | Admitting: INTERNAL MEDICINE
Payer: MEDICARE

## 2024-01-12 ENCOUNTER — ANESTHESIA (OUTPATIENT)
Dept: GASTROENTEROLOGY | Facility: HOSPITAL | Age: 64
End: 2024-01-12
Payer: MEDICARE

## 2024-01-12 VITALS
DIASTOLIC BLOOD PRESSURE: 70 MMHG | SYSTOLIC BLOOD PRESSURE: 94 MMHG | BODY MASS INDEX: 28.8 KG/M2 | OXYGEN SATURATION: 92 % | HEART RATE: 83 BPM | WEIGHT: 218.26 LBS | TEMPERATURE: 97.7 F | RESPIRATION RATE: 18 BRPM

## 2024-01-12 LAB — GLUCOSE BLDC GLUCOMTR-MCNC: 150 MG/DL (ref 70–99)

## 2024-01-12 PROCEDURE — 82948 REAGENT STRIP/BLOOD GLUCOSE: CPT

## 2024-01-12 PROCEDURE — G0105 COLORECTAL SCRN; HI RISK IND: HCPCS | Performed by: INTERNAL MEDICINE

## 2024-01-12 PROCEDURE — 25810000003 LACTATED RINGERS PER 1000 ML

## 2024-01-12 PROCEDURE — 25010000002 PROPOFOL 10 MG/ML EMULSION: Performed by: NURSE ANESTHETIST, CERTIFIED REGISTERED

## 2024-01-12 RX ORDER — SODIUM CHLORIDE, SODIUM LACTATE, POTASSIUM CHLORIDE, CALCIUM CHLORIDE 600; 310; 30; 20 MG/100ML; MG/100ML; MG/100ML; MG/100ML
30 INJECTION, SOLUTION INTRAVENOUS CONTINUOUS
Status: DISCONTINUED | OUTPATIENT
Start: 2024-01-12 | End: 2024-01-12 | Stop reason: HOSPADM

## 2024-01-12 RX ORDER — PROPOFOL 10 MG/ML
VIAL (ML) INTRAVENOUS AS NEEDED
Status: DISCONTINUED | OUTPATIENT
Start: 2024-01-12 | End: 2024-01-12 | Stop reason: SURG

## 2024-01-12 RX ORDER — LIDOCAINE HYDROCHLORIDE 20 MG/ML
INJECTION, SOLUTION EPIDURAL; INFILTRATION; INTRACAUDAL; PERINEURAL AS NEEDED
Status: DISCONTINUED | OUTPATIENT
Start: 2024-01-12 | End: 2024-01-12 | Stop reason: SURG

## 2024-01-12 RX ORDER — PREDNISONE 20 MG/1
5 TABLET ORAL DAILY PRN
COMMUNITY

## 2024-01-12 RX ADMIN — PROPOFOL 70 MG: 10 INJECTION, EMULSION INTRAVENOUS at 08:12

## 2024-01-12 RX ADMIN — LIDOCAINE HYDROCHLORIDE 100 MG: 20 INJECTION, SOLUTION EPIDURAL; INFILTRATION; INTRACAUDAL; PERINEURAL at 08:12

## 2024-01-12 RX ADMIN — SODIUM CHLORIDE, POTASSIUM CHLORIDE, SODIUM LACTATE AND CALCIUM CHLORIDE 30 ML/HR: 600; 310; 30; 20 INJECTION, SOLUTION INTRAVENOUS at 07:34

## 2024-01-12 RX ADMIN — PROPOFOL 200 MCG/KG/MIN: 10 INJECTION, EMULSION INTRAVENOUS at 08:12

## 2024-01-12 NOTE — ANESTHESIA POSTPROCEDURE EVALUATION
Patient: Narciso Mejía    Procedure Summary       Date: 01/12/24 Room / Location: [facility] ENDOSCOPY 4 / [facility] ENDOSCOPY    Anesthesia Start: 0809 Anesthesia Stop: 0847    Procedure: COLONOSCOPY Diagnosis:       Chronic idiopathic constipation      History of colon polyps      (Chronic idiopathic constipation [K59.04])      (History of colon polyps [Z86.010])    Surgeons: Landry Elizabeth MD Provider: Tigre Mclaughlin CRNA    Anesthesia Type: general ASA Status: 4            Anesthesia Type: general    Vitals  Vitals Value Taken Time   BP 94/70 01/12/24 0905   Temp 36.5 °C (97.7 °F) 01/12/24 0905   Pulse 83 01/12/24 0907   Resp 18 01/12/24 0905   SpO2 97 % 01/12/24 0907   Vitals shown include unfiled device data.        Post Anesthesia Care and Evaluation    Post-procedure mental status: acceptable.  Pain management: satisfactory to patient    Airway patency: patent  Anesthetic complications: No anesthetic complications    Cardiovascular status: acceptable  Respiratory status: acceptable    Comments: Per chart review

## 2024-01-12 NOTE — H&P
Pre Procedure History & Physical    Chief Complaint:   Change in bowel habits    Subjective     HPI:   Change in bowel habits    Past Medical History:   Past Medical History:   Diagnosis Date    Allergy to alpha-gal     Anemia     Angina pectoris     Anxiety and depression     Arthritis 2000    Asthma     Bipolar disorder 2008    Broken bones     Bronchitis     Bulging lumbar disc     CAD (coronary artery disease)     Crohn's colitis     Deafness 2010    Depression     Deviated nasal septum     Diabetes mellitus     Disease of thyroid gland     Dysphagia     Erectile dysfunction 04/23/2018    Forgetfulness 2008    GERD (gastroesophageal reflux disease)     Gout 2007    H/O ulceration     Ulcer    Heart attack     Hemorrhoids 2000    Hiatal hernia     History of sleep walking     HLD (hyperlipidemia)     High Cholesterol    Hypertension     Hypoparathyroidism     Hypothyroidism     Irritant contact dermatitis due to concrete     CONCRETE POISONING    Leg pain     Limb swelling     Liver disease     Migraines     Muscle cramps     Muscular dystrophy     Myocardial infarction     x 2    Narcolepsy     Nasal obstruction     Nausea and vomiting 03/29/2023    Neurologic disorder     Unspecified    Night sweat     LYNNETTE (obstructive sleep apnea)     Peptic ulceration     Perineal pain in male 05/24/2018    Pituitary gland disorder     Pneumonia     PONV (postoperative nausea and vomiting)     Rectal bleeding     RLS (restless legs syndrome)     Seasonal allergies     Sinus trouble     Skin disease 2000    Skin Disease/Psoriasis/eczema    Testosterone deficiency     Testosterone deficiency in male     Trouble swallowing     Vestibular migraine     Vitamin D deficiency        Past Surgical History:  Past Surgical History:   Procedure Laterality Date    APPENDECTOMY  1976    CARPAL TUNNEL RELEASE      COLONOSCOPY      CORONARY ANGIOPLASTY WITH STENT PLACEMENT      ENDOSCOPY      ENDOSCOPY N/A 6/19/2023    Procedure:  ESOPHAGOGASTRODUODENOSCOPY WITH BIOPSIES, BALLOON DILATATION 18-20;  Surgeon: Landry Elizabeth MD;  Location: [facility] ENDOSCOPY;  Service: Gastroenterology;  Laterality: N/A;  hiatal hernia, SCHATZKIS RING    ESOPHAGUS SURGERY      HERNIA REPAIR      Umbilical    NASAL SEPTUM SURGERY  2018    Dr. Pearl - Septoplasty with bilateral reduction of nasal turbinates    POLYPECTOMY      TONSILECTOMY, ADENOIDECTOMY, BILATERAL MYRINGOTOMY AND TUBES  1972       Family History:  Family History   Problem Relation Age of Onset    Stroke Mother     Heart disease Mother     Diabetes Mother         Diabetes Mellitus    Arthritis Mother     Narcolepsy Mother     Restless legs syndrome Mother     Periodic limb movement Mother     Insomnia Mother     Sleep walking Mother     Diabetes Father         Diabetes Mellitus    Arthritis Father     Asthma Father             Heart attack Father     Heart disease Father         Heart Failure    Heart disease Sister     Diabetes Sister         Diabetes Mellitus    Sleep apnea Sister     Heart disease Brother         Heart failure    Cancer Brother         Unspecified    Diabetes Brother         Diabetes Mellitus    Arthritis Brother     Sleep apnea Brother     Restless legs syndrome Brother     Colon cancer Maternal Uncle 71    Colon cancer Maternal Grandfather 90    Other Daughter         Renal Calculus       Social History:   reports that he quit smoking about 24 years ago. His smoking use included cigarettes. He started smoking about 47 years ago. He has a 50.00 pack-year smoking history. He has never used smokeless tobacco. He reports current alcohol use. He reports that he does not use drugs.    Medications:   Medications Prior to Admission   Medication Sig Dispense Refill Last Dose    allopurinol (ZYLOPRIM) 300 MG tablet Take 1 tablet by mouth Daily. 90 tablet 1 2024    Ashwagandha 500 MG capsule Take  by mouth.   2024    aspirin (aspirin) 81 MG EC tablet Take 1  tablet by mouth Daily.   1/11/2024    B Complex Vitamins (VITAMIN B COMPLEX PO) Take  by mouth.   1/11/2024    bisacodyl (DULCOLAX) 5 MG EC tablet Take 3 tablets by mouth Daily As Needed for Constipation.   1/11/2024    cetirizine (zyrTEC) 10 MG tablet Take 1 tablet by mouth 2 (Two) Times a Day.   1/11/2024    doxazosin (CARDURA) 4 MG tablet Take 1 tablet by mouth Every Night.   1/11/2024    famotidine (PEPCID) 20 MG tablet Take 1 tablet by mouth 2 (Two) Times a Day.   1/11/2024    Insulin Glargine, 2 Unit Dial, (Toujeo Max SoloStar) 300 UNIT/ML solution pen-injector injection Inject 30 Units under the skin into the appropriate area as directed Daily.   1/11/2024    levothyroxine (SYNTHROID, LEVOTHROID) 100 MCG tablet Take 1 tablet by mouth Daily.   1/11/2024    Magnesium 500 MG capsule Daily.   Past Week    metFORMIN ER (GLUCOPHAGE-XR) 500 MG 24 hr tablet Take 2 tablets by mouth.   1/11/2024    metoprolol succinate XL (TOPROL-XL) 25 MG 24 hr tablet Take 1 tablet by mouth Daily.   1/10/2024    NON FORMULARY Nellie Juarez, 3,000   1/11/2024    NON FORMULARY asa   1/11/2024    pantoprazole (PROTONIX) 40 MG EC tablet Take 1 tablet by mouth Daily.   1/11/2024    polyethylene glycol (MIRALAX) 17 g packet Take 17 g by mouth Daily.   1/11/2024    ranitidine (ZANTAC) 150 MG capsule Take 1 capsule by mouth Daily.   1/11/2024    spironolactone (ALDACTONE) 25 MG tablet Take 1 tablet by mouth Daily. 90 tablet 1 1/11/2024    topiramate (TOPAMAX) 100 MG tablet Take 1 tablet by mouth Every 12 (Twelve) Hours.   1/11/2024    vitamin E 100 UNIT capsule Take 1 capsule by mouth Daily.   1/11/2024    Continuous Blood Gluc Sensor (FreeStyle Agapito 2 Sensor) misc 1 EVERY TWO WEEKS       Dulaglutide 1.5 MG/0.5ML solution pen-injector Inject 1.5 mg under the skin into the appropriate area as directed 1 (One) Time Per Week. FRIDAY'S   1/5/2024    Lysine 500 MG capsule Take  by mouth.   More than a month    ondansetron (ZOFRAN) 8 MG tablet  Take  by mouth Every 8 (Eight) Hours As Needed for Nausea or Vomiting.   More than a month    ONETOUCH VERIO test strip Use to test BG 3 times daily. DX Code: E11.9 300 each 1     predniSONE (DELTASONE) 20 MG tablet Take 5 mg by mouth Daily As Needed (ALPHA GAL REACTION).   More than a month    Prucalopride Succinate (Motegrity) 2 MG tablet Take 1 tablet by mouth Daily. 30 tablet 3        Allergies:  Alpha-gal, Penicillins, A-d-dimethicone-e-zno [dimethicone-zinc oxide], Bacitracin, Bacitracin-neomycin-polymyxin, and Bydureon [exenatide]        Objective     Blood pressure 99/74, pulse 78, temperature 97.3 °F (36.3 °C), temperature source Temporal, resp. rate 14, weight 99 kg (218 lb 4.1 oz), SpO2 100%.    Physical Exam   Constitutional: Pt is oriented to person, place, and time and well-developed, well-nourished, and in no distress.   Mouth/Throat: Oropharynx is clear and moist.   Neck: Normal range of motion.   Cardiovascular: Normal rate, regular rhythm and normal heart sounds.    Pulmonary/Chest: Effort normal and breath sounds normal.   Abdominal: Soft. Nontender  Skin: Skin is warm and dry.   Psychiatric: Mood, memory, affect and judgment normal.     Assessment & Plan     Diagnosis:  Change in bowel habits    Anticipated Surgical Procedure:  Colonoscopy    The risks, benefits, and alternatives of this procedure have been discussed with the patient or the responsible party- the patient understands and agrees to proceed.

## 2024-01-12 NOTE — ANESTHESIA PREPROCEDURE EVALUATION
Anesthesia Evaluation     Patient summary reviewed and Nursing notes reviewed   history of anesthetic complications:  PONV  NPO Solid Status: > 8 hours  NPO Liquid Status: > 4 hours           Airway   Mallampati: II  TM distance: >3 FB  No difficulty expected  Dental    (+) partials    Pulmonary     breath sounds clear to auscultation  (+) pneumonia resolved , asthma,sleep apnea  Cardiovascular   Exercise tolerance: good (4-7 METS)    Rhythm: regular  Rate: normal    (+) hypertension well controlled, past MI  >12 months, CAD, angina, CHF , PVD, hyperlipidemia      Neuro/Psych  (+) headaches, dizziness/light headedness, numbness, psychiatric history Anxiety, Depression and Bipolar  GI/Hepatic/Renal/Endo    (+) hiatal hernia, GERD well controlled, PUD, GI bleeding , liver disease, diabetes mellitus type 2, thyroid problem hypothyroidism    Musculoskeletal     Abdominal    Substance History      OB/GYN          Other   arthritis,     ROS/Med Hx Other: ECHO 09/27/22:   · Left ventricular ejection fraction appears to be 41 - 45%.  · Left ventricular diastolic function is consistent with (grade I) impaired relaxation.  · The following left ventricular wall segments are hypokinetic: mid anterior, apical septal and mid anteroseptal.  · No hemodynamically significant valvular pathology.    EKG 08/22/22: HR 70, SR    Last dose trulicity 1/05                      Anesthesia Plan    ASA 4     general   total IV anesthesia  (Total IV Anesthesia    Patient understands anesthesia not responsible for dental damage.  )  intravenous induction     Anesthetic plan, risks, benefits, and alternatives have been provided, discussed and informed consent has been obtained with: patient.  Pre-procedure education provided  Plan discussed with CRNA.      CODE STATUS:

## 2024-01-15 ENCOUNTER — TELEPHONE (OUTPATIENT)
Dept: GASTROENTEROLOGY | Facility: CLINIC | Age: 64
End: 2024-01-15
Payer: MEDICARE

## 2024-01-15 NOTE — TELEPHONE ENCOUNTER
Called pt to see if he would like to schedule Swallow study. No answer, left VM for pt to return call

## 2024-01-16 ENCOUNTER — TELEPHONE (OUTPATIENT)
Dept: GASTROENTEROLOGY | Facility: CLINIC | Age: 64
End: 2024-01-16
Payer: MEDICARE

## 2024-01-16 NOTE — TELEPHONE ENCOUNTER
Colonoscopy 1/12/2024: Good prep, internal hemorrhoids, normal colonoscopy  Recall 5 years due to family history of colon cancer  Patient was referred to Dr. Perez for severe constipation and dysphagia, he may follow-up with us as needed

## 2024-01-16 NOTE — TELEPHONE ENCOUNTER
S/w patient. Patient aware of results.     Patient placed in 5 year colon cancer screening recall.     Care gap updated.     Appointment with Dr. Perez 06/18/2024.    Patient will call us if needed or with any questions / concerns

## 2024-01-29 ENCOUNTER — APPOINTMENT (OUTPATIENT)
Dept: ULTRASOUND IMAGING | Facility: HOSPITAL | Age: 64
End: 2024-01-29
Payer: MEDICARE

## 2024-01-29 ENCOUNTER — HOSPITAL ENCOUNTER (EMERGENCY)
Facility: HOSPITAL | Age: 64
Discharge: HOME | End: 2024-01-29
Attending: EMERGENCY MEDICINE | Admitting: EMERGENCY MEDICINE
Payer: MEDICARE

## 2024-01-29 ENCOUNTER — APPOINTMENT (OUTPATIENT)
Dept: GENERAL RADIOLOGY | Facility: HOSPITAL | Age: 64
End: 2024-01-29
Payer: MEDICARE

## 2024-01-29 VITALS
RESPIRATION RATE: 15 BRPM | HEIGHT: 74 IN | TEMPERATURE: 97.6 F | DIASTOLIC BLOOD PRESSURE: 84 MMHG | HEART RATE: 87 BPM | WEIGHT: 222.66 LBS | SYSTOLIC BLOOD PRESSURE: 128 MMHG | OXYGEN SATURATION: 100 % | BODY MASS INDEX: 28.58 KG/M2

## 2024-01-29 DIAGNOSIS — S22.41XA CLOSED FRACTURE OF MULTIPLE RIBS OF RIGHT SIDE, INITIAL ENCOUNTER: Primary | ICD-10-CM

## 2024-01-29 PROCEDURE — 71101 X-RAY EXAM UNILAT RIBS/CHEST: CPT

## 2024-01-29 PROCEDURE — 99284 EMERGENCY DEPT VISIT MOD MDM: CPT

## 2024-01-29 PROCEDURE — 76705 ECHO EXAM OF ABDOMEN: CPT

## 2024-01-29 RX ORDER — CYCLOBENZAPRINE HCL 10 MG
10 TABLET ORAL ONCE
Status: COMPLETED | OUTPATIENT
Start: 2024-01-29 | End: 2024-01-29

## 2024-01-29 RX ORDER — HYDROCODONE BITARTRATE AND ACETAMINOPHEN 5; 325 MG/1; MG/1
1 TABLET ORAL EVERY 6 HOURS PRN
Qty: 12 TABLET | Refills: 0 | Status: SHIPPED | OUTPATIENT
Start: 2024-01-29

## 2024-01-29 RX ADMIN — CYCLOBENZAPRINE HYDROCHLORIDE 10 MG: 10 TABLET, FILM COATED ORAL at 12:39

## 2024-01-29 NOTE — ED PROVIDER NOTES
Time: 12:41 PM EST  Date of encounter:  1/29/2024  Independent Historian/Clinical History and Information was obtained by:   Patient    History is limited by: N/A    Chief Complaint: Rib pain      History of Present Illness:  Patient is a 63 y.o. year old male who presents to the emergency department for evaluation of rib pain.  Patient states 3 weeks ago he was walking in the hospital when he tripped over a piece of concrete causing him to fall.  He states that he had his hands in his pockets when the fall occurred so he was unable to brace himself and states that his right elbow impacted his right ribs.  Patient states he has had pain with movement and breathing since that time.  Patient states the pain radiates down to his abdomen and side on the right side as well.  Patient is denying any shortness of breath but states that when he begins to cough a lot he feels like he cannot catch his breath.  Patient was evaluated urgent care this morning and recommended come to the emergency department for further evaluation.  Patient has not been taking anything for pain control.    HPI    Patient Care Team  Primary Care Provider: Marco Nicholson MD    Past Medical History:     Allergies   Allergen Reactions    Alpha-Gal Hives, Itching and Swelling    Penicillins Anaphylaxis    A-D-Dimethicone-E-Zno [Dimethicone-Zinc Oxide] Hives    Bacitracin Itching and Rash    Bacitracin-Neomycin-Polymyxin Hives     Hives and blisters    Bydureon [Exenatide] Hives     Past Medical History:   Diagnosis Date    Allergy to alpha-gal     Anemia     Angina pectoris     Anxiety and depression     Arthritis 2000    Asthma     Bipolar disorder 2008    Broken bones     Bronchitis     Bulging lumbar disc     CAD (coronary artery disease)     Crohn's colitis     Deafness 2010    Depression     Deviated nasal septum     Diabetes mellitus     Disease of thyroid gland     Dysphagia     Erectile dysfunction 04/23/2018    Forgetfulness 2008    GERD  (gastroesophageal reflux disease)     Gout 2007    H/O ulceration     Ulcer    Heart attack     Hemorrhoids 2000    Hiatal hernia     History of sleep walking     HLD (hyperlipidemia)     High Cholesterol    Hypertension     Hypoparathyroidism     Hypothyroidism     Irritant contact dermatitis due to concrete     CONCRETE POISONING    Leg pain     Limb swelling     Liver disease     Migraines     Muscle cramps     Muscular dystrophy     Myocardial infarction     x 2    Narcolepsy     Nasal obstruction     Nausea and vomiting 03/29/2023    Neurologic disorder     Unspecified    Night sweat     LYNNETTE (obstructive sleep apnea)     Peptic ulceration     Perineal pain in male 05/24/2018    Pituitary gland disorder     Pneumonia     PONV (postoperative nausea and vomiting)     Rectal bleeding     RLS (restless legs syndrome)     Seasonal allergies     Sinus trouble     Skin disease 2000    Skin Disease/Psoriasis/eczema    Testosterone deficiency     Testosterone deficiency in male     Trouble swallowing     Vestibular migraine     Vitamin D deficiency      Past Surgical History:   Procedure Laterality Date    APPENDECTOMY  1976    CARPAL TUNNEL RELEASE      COLONOSCOPY      COLONOSCOPY N/A 1/12/2024    Procedure: COLONOSCOPY;  Surgeon: Landry Elizabeth MD;  Location: [facility] ENDOSCOPY;  Service: Gastroenterology;  Laterality: N/A;  HEMORRHOIDS    CORONARY ANGIOPLASTY WITH STENT PLACEMENT      ENDOSCOPY      ENDOSCOPY N/A 6/19/2023    Procedure: ESOPHAGOGASTRODUODENOSCOPY WITH BIOPSIES, BALLOON DILATATION 18-20;  Surgeon: Landry Elizabeth MD;  Location: [facility] ENDOSCOPY;  Service: Gastroenterology;  Laterality: N/A;  hiatal hernia, SCHATZKIS RING    ESOPHAGUS SURGERY      HERNIA REPAIR      Umbilical    NASAL SEPTUM SURGERY  04/2018    Dr. Pearl - Septoplasty with bilateral reduction of nasal turbinates    POLYPECTOMY      TONSILECTOMY, ADENOIDECTOMY, BILATERAL MYRINGOTOMY AND TUBES  1972     Family History    Problem Relation Age of Onset    Stroke Mother     Heart disease Mother     Diabetes Mother         Diabetes Mellitus    Arthritis Mother     Narcolepsy Mother     Restless legs syndrome Mother     Periodic limb movement Mother     Insomnia Mother     Sleep walking Mother     Diabetes Father         Diabetes Mellitus    Arthritis Father     Asthma Father             Heart attack Father     Heart disease Father         Heart Failure    Heart disease Sister     Diabetes Sister         Diabetes Mellitus    Sleep apnea Sister     Heart disease Brother         Heart failure    Cancer Brother         Unspecified    Diabetes Brother         Diabetes Mellitus    Arthritis Brother     Sleep apnea Brother     Restless legs syndrome Brother     Colon cancer Maternal Uncle 71    Colon cancer Maternal Grandfather 90    Other Daughter         Renal Calculus       Home Medications:  Prior to Admission medications    Medication Sig Start Date End Date Taking? Authorizing Provider   allopurinol (ZYLOPRIM) 300 MG tablet Take 1 tablet by mouth Daily. 19   Susu Heck APRN   Ashwagandha 500 MG capsule Take  by mouth.    Adriana Lee MD   aspirin (aspirin) 81 MG EC tablet Take 1 tablet by mouth Daily. 6/23/15   Adriana Lee MD   B Complex Vitamins (VITAMIN B COMPLEX PO) Take  by mouth.    Adriana Lee MD   bisacodyl (DULCOLAX) 5 MG EC tablet Take 3 tablets by mouth Daily As Needed for Constipation.    Adriana Lee MD   cetirizine (zyrTEC) 10 MG tablet Take 1 tablet by mouth 2 (Two) Times a Day.    Adriana Lee MD   Continuous Blood Gluc Sensor (FreeStyle Agapito 2 Sensor) misc 1 EVERY TWO WEEKS 23   Adriana Lee MD   doxazosin (CARDURA) 4 MG tablet Take 1 tablet by mouth Every Night.    Adriana Lee MD   Dulaglutide 1.5 MG/0.5ML solution pen-injector Inject 1.5 mg under the skin into the appropriate area as directed 1 (One) Time Per Week. FRIDAY'S     Adriana Lee MD   famotidine (PEPCID) 20 MG tablet Take 1 tablet by mouth 2 (Two) Times a Day.    Adriana Lee MD   Insulin Glargine, 2 Unit Dial, (Toujeo Max SoloStar) 300 UNIT/ML solution pen-injector injection Inject 30 Units under the skin into the appropriate area as directed Daily. 2/22/23   Adriana Lee MD   levothyroxine (SYNTHROID, LEVOTHROID) 100 MCG tablet Take 1 tablet by mouth Daily.    Adriana Lee MD   Lysine 500 MG capsule Take  by mouth.    Adriana Lee MD   Magnesium 500 MG capsule Daily.    Adriana Lee MD   metFORMIN ER (GLUCOPHAGE-XR) 500 MG 24 hr tablet Take 2 tablets by mouth. 8/2/23   Adriana Lee MD   metoprolol succinate XL (TOPROL-XL) 25 MG 24 hr tablet Take 1 tablet by mouth Daily. 9/5/23 8/30/24  Adriana Lee MD   NON FORMULARY Nellie Juarez, 3,000    Adriana Lee MD   NON FORMULARY Adriana Sterling MD   ondansetron (ZOFRAN) 8 MG tablet Take  by mouth Every 8 (Eight) Hours As Needed for Nausea or Vomiting.    Adriana Lee MD   ONETOUCH VERIO test strip Use to test BG 3 times daily. DX Code: E11.9 2/18/19   Susu Heck APRN   pantoprazole (PROTONIX) 40 MG EC tablet Take 1 tablet by mouth Daily.    Adriana Lee MD   polyethylene glycol (MIRALAX) 17 g packet Take 17 g by mouth Daily.    Adriana Lee MD   predniSONE (DELTASONE) 20 MG tablet Take 5 mg by mouth Daily As Needed (ALPHA GAL REACTION).    Adriana Lee MD   Prucalopride Succinate (Motegrity) 2 MG tablet Take 1 tablet by mouth Daily. 11/21/23   Danii Mclaughlin APRN   ranitidine (ZANTAC) 150 MG capsule Take 1 capsule by mouth Daily. 6/23/15   Adriana Lee MD   spironolactone (ALDACTONE) 25 MG tablet Take 1 tablet by mouth Daily. 1/14/19   Susu Heck APRN   topiramate (TOPAMAX) 100 MG tablet Take 1 tablet by mouth Every 12 (Twelve) Hours. 9/5/23   Adriana Lee MD  "  vitamin E 100 UNIT capsule Take 1 capsule by mouth Daily.    Provider, Adriana, MD        Social History:   Social History     Tobacco Use    Smoking status: Former     Packs/day: 2.00     Years: 25.00     Additional pack years: 0.00     Total pack years: 50.00     Types: Cigarettes     Start date: 1976     Quit date: 1999     Years since quittin.0    Smokeless tobacco: Never    Tobacco comments:     20 years ago   Vaping Use    Vaping Use: Never used   Substance Use Topics    Alcohol use: Yes     Comment: None since 23    Drug use: No         Review of Systems:  Review of Systems   Respiratory:  Positive for cough. Negative for shortness of breath.         Rib pain   Cardiovascular:  Negative for chest pain.   Gastrointestinal:  Positive for abdominal pain.   Skin:  Negative for color change.   All other systems reviewed and are negative.       Physical Exam:  /84 (BP Location: Left arm, Patient Position: Sitting)   Pulse 87   Temp 97.6 °F (36.4 °C) (Oral)   Resp 15   Ht 188 cm (74\")   Wt 101 kg (222 lb 10.6 oz)   SpO2 100%   BMI 28.59 kg/m²     Physical Exam  Vitals and nursing note reviewed.   Constitutional:       Appearance: Normal appearance. He is normal weight.   HENT:      Head: Normocephalic and atraumatic.      Nose: Nose normal.   Eyes:      Extraocular Movements: Extraocular movements intact.      Conjunctiva/sclera: Conjunctivae normal.      Pupils: Pupils are equal, round, and reactive to light.   Cardiovascular:      Rate and Rhythm: Normal rate and regular rhythm.      Heart sounds: Normal heart sounds.   Pulmonary:      Effort: Pulmonary effort is normal.      Breath sounds: Normal breath sounds.   Chest:      Chest wall: Tenderness present.   Abdominal:      General: Abdomen is flat. Bowel sounds are normal. There is no distension.      Palpations: Abdomen is soft. There is no mass.      Tenderness: There is abdominal tenderness in the right upper quadrant and " right lower quadrant. There is no right CVA tenderness, left CVA tenderness, guarding or rebound.      Hernia: No hernia is present.   Musculoskeletal:         General: Normal range of motion.      Cervical back: Normal range of motion and neck supple.   Skin:     General: Skin is warm and dry.   Neurological:      General: No focal deficit present.      Mental Status: He is alert and oriented to person, place, and time.   Psychiatric:         Mood and Affect: Mood normal.         Behavior: Behavior normal.         Thought Content: Thought content normal.         Judgment: Judgment normal.                Procedures:  Procedures      Medical Decision Making:    Comorbidities that affect care:    Diabetes, Hypertension    External Notes reviewed:    Previous Clinic Note: Urgent care note from today where patient was seen for same complaint      The following orders were placed and all results were independently analyzed by me:  Orders Placed This Encounter   Procedures    XR Ribs Right With PA Chest    US Abdomen Limited    Provide patient with incentive spirometer  Misc Nursing Order (Specify)       Medications Given in the Emergency Department:  Medications   cyclobenzaprine (FLEXERIL) tablet 10 mg (10 mg Oral Given 1/29/24 1239)        ED Course:    ED Course as of 01/29/24 1531   Mon Jan 29, 2024   1257 XR Ribs Right With PA Chest  1. No definite acute pulmonary process.  2. Suggested fractures of the right 9th and 10th ribs. [MD]   1451 US Abdomen Limited  1. No definite sonographic evidence of cholelithiasis or cholecystitis.  2. Trace nonspecific perihepatic fluid.  3. Pancreas was obscured by bowel gas.    4. Otherwise unremarkable right upper quadrant ultrasound.   [MD]      ED Course User Index  [MD] Sedrick Thomas PA-C       Labs:    Lab Results (last 24 hours)       ** No results found for the last 24 hours. **             Imaging:    US Abdomen Limited    Result Date: 1/29/2024  PROCEDURE: US ABDOMEN  LIMITED  COMPARISON: None  INDICATIONS: R abdominal pain  TECHNIQUE: Ultrasound examination of the right upper quadrant of the abdomen was performed.   FINDINGS:  LIVER: Liver measures 19.5 cm in length.  No definite focal or diffuse hepatic abnormality was seen.  There appears to be normal directional flow in the hepatic vasculature. BILIARY: Gallbladder is minimally distended.  Patient was not NPO.  Gallbladder wall measures 2.2 mm in thickness.  No definite gallstones are identified.  Common duct measures 3.7 mm in diameter.  Sonographic Valdovinos sign was negative. PANCREAS: The pancreas was obscured by bowel gas. RIGHT KIDNEY: Right kidney measures approximately 12.4 cm in length.  No definite stone, hydronephrosis, or suspicious renal lesion. OTHER: Trace anechoic perihepatic fluid was seen.        1. No definite sonographic evidence of cholelithiasis or cholecystitis. 2. Trace nonspecific perihepatic fluid. 3. Pancreas was obscured by bowel gas.  4. Otherwise unremarkable right upper quadrant ultrasound.      RAFAEL RAZA MD       ELECTRONICALLY SIGNED AND APPROVED BY: RAFAEL RAZA MD ON 1/29/2024 AT 14:44             XR Ribs Right With PA Chest    Result Date: 1/29/2024  PROCEDURE: XR RIBS RIGHT W PA CHEST  COMPARISON: [facility], CT, CHEST W/WO CONTRAST, 7/28/2020, 12:58.  [facility], CR, XR CHEST 2 VW, 9/22/2021, 9:00.  INDICATIONS: pain after fall  FINDINGS:  The heart is not definitely enlarged.  There is no favian consolidation or obvious pleural effusions.  There is a granuloma in the right mid chest.  On evaluation of the right ribcage , there is a fracture involving posterolateral aspect of the right 10th rib and probably more lateral aspect of the right 9th rib.        1. No definite acute pulmonary process. 2. Suggested fractures of the right 9th and 10th ribs.     JARON BAJWA MD       Electronically Signed and Approved By: JARON BAJWA MD on 1/29/2024 at 12:53                 Differential Diagnosis and Discussion:    Abdominal Pain: Based on the patient's signs and symptoms, I considered abdominal aortic aneurysm, small bowel obstruction, pancreatitis, acute cholecystitis, acute appendecitis, peptic ulcer disease, gastritis, colitis, endocrine disorders, irritable bowel syndrome and other differential diagnosis an etiology of the patient's abdominal pain.    All X-rays impressions were independently interpreted by me.  Ultrasound impression was interpreted by me.     MDM  Number of Diagnoses or Management Options  Closed fracture of multiple ribs of right side, initial encounter  Diagnosis management comments: Patient presented to the emergency department today for evaluation right rib and abdominal pain after fall weeks ago.  X-ray of the ribs does note 1/9 and 10th right rib fracture.  Ultrasound was completed of the abdomen and head no acute findings.  I did provide patient with incentive spirometer and will discharge patient home with [place] for pain control.       Amount and/or Complexity of Data Reviewed  Tests in the radiology section of CPT®: reviewed and ordered    Risk of Complications, Morbidity, and/or Mortality  Presenting problems: moderate  Diagnostic procedures: low  Management options: low    Patient Progress  Patient progress: stable       Patient Care Considerations:    CT ABDOMEN AND PELVIS: I considered ordering a CT scan of the abdomen and pelvis however there is no abdominal ecchymosis      Consultants/Shared Management Plan:    SHARED VISIT: I have discussed the case with my supervising physician, Dr. Hunter who stateshe will provide prescription for patient pain control. The substantive portion of the medical decision was made by the attesting physician who made or approve the management plan and will take responsibility for the patient.  Clinical findings were discussed and ultimate disposition was made in consult with supervising  physician.    Social Determinants of Health:    Patient is independent, reliable, and has access to care.       Disposition and Care Coordination:    Discharged: The patient is suitable and stable for discharge with no need for consideration of admission.    I have explained the patient´s condition, diagnoses and treatment plan based on the information available to me at this time. I have answered questions and addressed any concerns. The patient has a good  understanding of the patient´s diagnosis, condition, and treatment plan as can be expected at this point. The vital signs have been stable. The patient´s condition is stable and appropriate for discharge from the emergency department.      The patient will pursue further outpatient evaluation with the primary care physician or other designated or consulting physician as outlined in the discharge instructions. They are agreeable to this plan of care and follow-up instructions have been explained in detail. The patient has received these instructions in written format and have expressed an understanding of the discharge instructions. The patient is aware that any significant change in condition or worsening of symptoms should prompt an immediate return to this or the closest emergency department or call to 911.  I have explained discharge medications and the need for follow up with the patient/caretakers. This was also printed in the discharge instructions. Patient was discharged with the following medications and follow up:      Medication List        New Prescriptions      HYDROcodone-acetaminophen 5-325 MG per tablet  Commonly known as: NORCO  Take 1 tablet by mouth Every 6 (Six) Hours As Needed for Moderate Pain.               Where to Get Your Medications        These medications were sent to [facility] [address] [place] [address] [facility] [address] [facility] [phone] [facility] [phone] [address] [facility] [facility] [address]      Phone: [phone]    HYDROcodone-acetaminophen 5-325 MG per tablet      No follow-up provider specified.     Final diagnoses:   Closed fracture of multiple ribs of right side, initial encounter        ED Disposition       ED Disposition   Discharge    Condition   Stable    Comment   --               This medical record created using voice recognition software.             Sedrick Thomas PA-C  01/29/24 1534

## 2024-01-29 NOTE — DISCHARGE INSTRUCTIONS
Your x-ray completed in the emergency department today shows rib fractures of your ninth and 10th rib.  Take Norco as needed for pain control.  Utilize spirometer as directed.  Follow-up with primary care provider if symptoms are not improving.  Return for new or worsening symptoms concerning to you.

## 2024-01-29 NOTE — ED PROVIDER NOTES
"SHARED VISIT NOTE:    Patient is 63 y.o. year old male that presents to the ED for evaluation of fall 3 weeks ago.         ED Course:    /84 (BP Location: Left arm, Patient Position: Sitting)   Pulse 87   Temp 97.6 °F (36.4 °C) (Oral)   Resp 15   Ht 188 cm (74\")   Wt 101 kg (222 lb 10.6 oz)   SpO2 100%   BMI 28.59 kg/m²   Results for orders placed or performed during the hospital encounter of 01/12/24   POC Glucose Once    Specimen: Blood   Result Value Ref Range    Glucose 150 (H) 70 - 99 mg/dL     Medications   cyclobenzaprine (FLEXERIL) tablet 10 mg (10 mg Oral Given 1/29/24 1239)     US Abdomen Limited    Result Date: 1/29/2024  Narrative: PROCEDURE: US ABDOMEN LIMITED  COMPARISON: None  INDICATIONS: R abdominal pain  TECHNIQUE: Ultrasound examination of the right upper quadrant of the abdomen was performed.   FINDINGS:  LIVER: Liver measures 19.5 cm in length.  No definite focal or diffuse hepatic abnormality was seen.  There appears to be normal directional flow in the hepatic vasculature. BILIARY: Gallbladder is minimally distended.  Patient was not NPO.  Gallbladder wall measures 2.2 mm in thickness.  No definite gallstones are identified.  Common duct measures 3.7 mm in diameter.  Sonographic Valdovinos sign was negative. PANCREAS: The pancreas was obscured by bowel gas. RIGHT KIDNEY: Right kidney measures approximately 12.4 cm in length.  No definite stone, hydronephrosis, or suspicious renal lesion. OTHER: Trace anechoic perihepatic fluid was seen.      Impression:   1. No definite sonographic evidence of cholelithiasis or cholecystitis. 2. Trace nonspecific perihepatic fluid. 3. Pancreas was obscured by bowel gas.  4. Otherwise unremarkable right upper quadrant ultrasound.      RAFAEL RAZA MD       ELECTRONICALLY SIGNED AND APPROVED BY: RAFAEL RAZA MD ON 1/29/2024 AT 14:44             XR Ribs Right With PA Chest    Result Date: 1/29/2024  Narrative: PROCEDURE: XR RIBS RIGHT W PA CHEST  " COMPARISON: [facility], CT, CHEST W/WO CONTRAST, 7/28/2020, 12:58.  [facility], CR, XR CHEST 2 VW, 9/22/2021, 9:00.  INDICATIONS: pain after fall  FINDINGS:  The heart is not definitely enlarged.  There is no favian consolidation or obvious pleural effusions.  There is a granuloma in the right mid chest.  On evaluation of the right ribcage , there is a fracture involving posterolateral aspect of the right 10th rib and probably more lateral aspect of the right 9th rib.      Impression:   1. No definite acute pulmonary process. 2. Suggested fractures of the right 9th and 10th ribs.     JARON BAJWA MD       Electronically Signed and Approved By: JARON BAJWA MD on 1/29/2024 at 12:53              MDM:    Procedures    All X-rays impressions were independently interpreted by me.  Ultrasound impression was interpreted by me.       SHARED VISIT ATTESTATION:    This visit was performed by both myself and an APC.  I performed the substantive portion of the medical decision making. The management plan was made or approved by me, and I take responsibility for patient management.           Kishor Hunter MD  15:25 EST  01/29/24         Kishor Hunter MD  01/29/24 1525

## 2024-06-10 ENCOUNTER — OFFICE VISIT (OUTPATIENT)
Dept: SLEEP MEDICINE | Facility: HOSPITAL | Age: 64
End: 2024-06-10
Payer: MEDICARE

## 2024-06-10 VITALS — WEIGHT: 212.1 LBS | BODY MASS INDEX: 28.11 KG/M2 | HEIGHT: 73 IN | HEART RATE: 94 BPM | OXYGEN SATURATION: 99 %

## 2024-06-10 DIAGNOSIS — E66.3 OVERWEIGHT (BMI 25.0-29.9): ICD-10-CM

## 2024-06-10 DIAGNOSIS — F32.A ANXIETY AND DEPRESSION: ICD-10-CM

## 2024-06-10 DIAGNOSIS — R45.4 IRRITABILITY AND ANGER: Primary | ICD-10-CM

## 2024-06-10 DIAGNOSIS — G47.33 OBSTRUCTIVE SLEEP APNEA, ADULT: ICD-10-CM

## 2024-06-10 DIAGNOSIS — F41.9 ANXIETY AND DEPRESSION: ICD-10-CM

## 2024-06-10 PROCEDURE — 1159F MED LIST DOCD IN RCRD: CPT | Performed by: NURSE PRACTITIONER

## 2024-06-10 PROCEDURE — G0463 HOSPITAL OUTPT CLINIC VISIT: HCPCS

## 2024-06-10 PROCEDURE — 99214 OFFICE O/P EST MOD 30 MIN: CPT | Performed by: NURSE PRACTITIONER

## 2024-06-10 PROCEDURE — 1160F RVW MEDS BY RX/DR IN RCRD: CPT | Performed by: NURSE PRACTITIONER

## 2024-06-10 RX ORDER — AZELASTINE HYDROCHLORIDE 137 UG/1
SPRAY, METERED NASAL
COMMUNITY
Start: 2024-05-15

## 2024-06-10 RX ORDER — FLUTICASONE PROPIONATE 50 MCG
SPRAY, SUSPENSION (ML) NASAL
COMMUNITY
Start: 2024-02-19

## 2024-06-10 RX ORDER — BUPROPION HYDROCHLORIDE 300 MG/1
1 TABLET ORAL DAILY
COMMUNITY
Start: 2024-04-25

## 2024-06-10 RX ORDER — ROSUVASTATIN CALCIUM 20 MG/1
1 TABLET, COATED ORAL DAILY
COMMUNITY
Start: 2024-05-15

## 2024-06-10 NOTE — PROGRESS NOTES
[address]  [place]   [place] [address]  Phone: [phone]  Fax: [phone]        SLEEP CLINIC FOLLOW-UP PROGRESS NOTE    Narciso Mejía  [id number]   1960  64 y.o.  male      PCP: Marco Nicholson MD    DATE OF VISIT: 6/10/2024          CHIEF COMPLAINT: Obstructive sleep apnea    HPI:  This is a 64 y.o. year old patient who presents to the clinic today for the management of obstructive sleep apnea.  Patient had a in lab polysomnogram sleep study 6/2023 showing mild obstructive sleep apnea with AHI of 6.1/hr. he was started on auto CPAP.  He was seen back 9/2023 at which point he had had 63% usage greater than 4 hours.  We discussed increasing usage further.  His AHI was above goal but also was having mask leak.  Chinstrap or mask fitting was discussed.  Adjusting humidifier was discussed.  Patient canceled his office follow-up.  He reports he was not meeting compliance and was not noticing improvement with PAP so turned back in.  Did discuss that AHI was not at goal yet low usage could have been contributing to lack of improvement in energy levels.  Also discussed that chronic health conditions can contribute to fatigue.  He reports his diabetes is not controlled.  He reports he is overdue for follow-up with cardiologist but is amenable to calling them.  He also reports his wife feels he needs to see a behavioral health specialist for his mood.  He has anxiety, depression, anger, denies SI or HI or violent behaviors.  He reports he does not have trouble staying awake if active but if sitting watching TV may doze off for several hours.  He may get 5 to 6.5 hours of sleep at night but then nap a lot during the day.  We discussed trying to wean back on napping to aid in nighttime sleep.  Discussed most people need 7-8 hours of sleep per night.  He reports his wife still tells him he s has significant snoring at night and symptoms of sleep apnea.  No significant  "weight changes from previous sleep study.          MEDICATIONS: reviewed     ALLERGIES:  Alpha-gal, Penicillins, A-d-dimethicone-e-zno [dimethicone-zinc oxide], Bacitracin, Bacitracin-neomycin-polymyxin, and Bydureon [exenatide]    SOCIAL HISTORY (habits pertaining to sleep medicine):  Tobacco use: No   Alcohol use: 0 per week  Caffeine use: 2     REVIEW OF SYSTEMS:   Pertinent positive symptoms are:  Hulbert Sleepiness Scale :Total score: 18   Chronic dyspnea  Nasal congestion  Abdominal bloating  Morning headache  Anxiety  Depression        PHYSICAL EXAMINATION:  CONSTITUTIONAL:  Vitals:    06/10/24 0900   Pulse: 94   SpO2: 99%   Weight: 96.2 kg (212 lb 1.6 oz)   Height: 185.4 cm (72.99\")    Body mass index is 27.99 kg/m².   HEAD: atraumatic, normocephalic  RESP SYSTEM: not in respiratory distress, breathing unlabored  CARDIOVASULAR: normal rate, no edema noted   NEURO: Alert and oriented x 3, mood and affect appeared appropriate                ASSESSMENT AND PLAN:  Obstructive Sleep Apnea: Reviewed pathophysiology of obstructive sleep apnea as well as potential risks of untreated sleep apnea including potential risk of harder to control blood pressure blood sugar, potential increased risk of arrhythmia, heart failure, heart attack, stroke, effects on sleep and daytime energy levels, effects on mood, memory, cognition, etc.  Patient verbalized understanding.  He still has symptoms of sleep apnea.  Though his sleep apnea was mild on previous polysomnogram, he does have multiple risk factors and excessive daytime sleepiness.  He is amenable to PAP titration study with close office follow-up to see if we can get him more comfortable with device and get AHI at goal.  He follow-up 30 days after getting PAP or sooner for issues or concerns.  Overweight: Body mass index is 27.99 kg/m².. Patients who are overweight or obese are at increased risk of sleep apnea/ sleep disordered breathing. Weight reduction and healthy " lifestyle are encouraged in overweight/ obese patients as part of a comprehensive approach to sleep apnea treatment.    CAD with history of revascularization, chronic combined systolic and diastolic CHF: He reports he is overdue for follow-up with cardiology.  He will call today to schedule follow-up.  Hypertension  Peripheral vascular disease  Anxiety, depression, anger: Denies SI or HI.  Denies violent behaviors towards others.  He would like referral to behavioral health to help address further.      Patient will follow-up 30 days after initiating use of PAP or sooner for issues or concerns.  Or follow-up sooner for any issues or concerns.  Patient's questions were answered.          Thank you for allowing me to participate in the care of this patient.     Ellyn Gautam DNP, APRN  [facility]

## 2024-06-20 ENCOUNTER — TELEPHONE (OUTPATIENT)
Dept: GASTROENTEROLOGY | Facility: CLINIC | Age: 64
End: 2024-06-20
Payer: MEDICARE

## 2024-06-20 NOTE — TELEPHONE ENCOUNTER
Received VM from patient requesting a call back regarded the letter he received. Returned patients call. No answer. Left vm and call back number

## 2024-06-25 ENCOUNTER — TELEPHONE (OUTPATIENT)
Dept: GASTROENTEROLOGY | Facility: CLINIC | Age: 64
End: 2024-06-25
Payer: MEDICARE

## 2024-06-25 NOTE — TELEPHONE ENCOUNTER
Sending a certified and regular letter due to pt canceling his appt with Dr Barajas office and not rescheduling it.       Certified number: [id number] [phone]

## 2024-08-06 ENCOUNTER — OFFICE VISIT (OUTPATIENT)
Dept: PSYCHIATRY | Facility: CLINIC | Age: 64
End: 2024-08-06
Payer: MEDICARE

## 2024-08-06 VITALS
BODY MASS INDEX: 27.98 KG/M2 | SYSTOLIC BLOOD PRESSURE: 112 MMHG | WEIGHT: 218 LBS | HEART RATE: 94 BPM | DIASTOLIC BLOOD PRESSURE: 69 MMHG | HEIGHT: 74 IN

## 2024-08-06 DIAGNOSIS — R45.4 IRRITABILITY AND ANGER: ICD-10-CM

## 2024-08-06 DIAGNOSIS — F43.10 COMPLEX POSTTRAUMATIC STRESS DISORDER: ICD-10-CM

## 2024-08-06 DIAGNOSIS — F43.10 POST TRAUMATIC STRESS DISORDER (PTSD): ICD-10-CM

## 2024-08-06 DIAGNOSIS — F32.9 REACTIVE DEPRESSION: Primary | ICD-10-CM

## 2024-08-06 PROCEDURE — 1159F MED LIST DOCD IN RCRD: CPT

## 2024-08-06 PROCEDURE — 3078F DIAST BP <80 MM HG: CPT

## 2024-08-06 PROCEDURE — 3074F SYST BP LT 130 MM HG: CPT

## 2024-08-06 PROCEDURE — 90792 PSYCH DIAG EVAL W/MED SRVCS: CPT

## 2024-08-06 PROCEDURE — 1160F RVW MEDS BY RX/DR IN RCRD: CPT

## 2024-08-06 NOTE — TREATMENT PLAN
Multi-Disciplinary Problems (from Behavioral Health Treatment Plan)      Active Problems       Problem: Anger  Start Date: 08/06/24      Problem Details: The patient self-scales this problem as a 2 with 10 being the worst.          Goal Priority Start Date Expected End Date End Date    Patient will develop specific, socially acceptable way to manage anger. -- 08/06/24 02/04/25 --    Goal Details: Progress toward goal:  The patient self-scales their progress related to this goal as a 2 with 10 being the worst.          Goal Intervention Frequency Start Date End Date    Process patient's angry feelings or outbursts that have recently occurred and review alternative behaviors Weekly 08/06/24 --    Intervention Details: Duration of treatment until remission of symptoms.          Goal Intervention Frequency Start Date End Date    Work with patient to develop constructive way to handle anger. Weekly 08/06/24 --    Intervention Details: Duration of treatment until remission of symptoms.                          Reviewed By       Nikki Leon APRN 08/06/24 1808    Nikki Leon APRN 08/06/24 1808                     I have discussed and reviewed this treatment plan with the patient.  It has been printed for signatures.
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
## 2024-08-27 ENCOUNTER — HOSPITAL ENCOUNTER (OUTPATIENT)
Dept: SLEEP MEDICINE | Facility: HOSPITAL | Age: 64
Discharge: HOME | End: 2024-08-27
Admitting: NURSE PRACTITIONER
Payer: MEDICARE

## 2024-08-27 DIAGNOSIS — E66.3 OVERWEIGHT (BMI 25.0-29.9): ICD-10-CM

## 2024-08-27 DIAGNOSIS — G47.33 OBSTRUCTIVE SLEEP APNEA, ADULT: ICD-10-CM

## 2024-08-27 PROCEDURE — 95811 POLYSOM 6/>YRS CPAP 4/> PARM: CPT

## 2024-08-28 DIAGNOSIS — G47.33 OBSTRUCTIVE SLEEP APNEA, ADULT: Primary | ICD-10-CM

## 2024-08-28 PROCEDURE — 95811 POLYSOM 6/>YRS CPAP 4/> PARM: CPT | Performed by: INTERNAL MEDICINE

## 2024-08-30 ENCOUNTER — TELEPHONE (OUTPATIENT)
Dept: SLEEP MEDICINE | Facility: HOSPITAL | Age: 64
End: 2024-08-30
Payer: MEDICARE

## 2024-09-09 ENCOUNTER — OFFICE VISIT (OUTPATIENT)
Dept: PSYCHIATRY | Facility: CLINIC | Age: 64
End: 2024-09-09
Payer: MEDICARE

## 2024-09-09 VITALS
BODY MASS INDEX: 26.82 KG/M2 | WEIGHT: 209 LBS | DIASTOLIC BLOOD PRESSURE: 72 MMHG | HEART RATE: 78 BPM | HEIGHT: 74 IN | SYSTOLIC BLOOD PRESSURE: 115 MMHG

## 2024-09-09 DIAGNOSIS — R45.4 IRRITABILITY AND ANGER: ICD-10-CM

## 2024-09-09 DIAGNOSIS — F33.1 MODERATE EPISODE OF RECURRENT MAJOR DEPRESSIVE DISORDER: Primary | ICD-10-CM

## 2024-09-09 DIAGNOSIS — F43.10 COMPLEX POSTTRAUMATIC STRESS DISORDER: ICD-10-CM

## 2024-09-09 PROCEDURE — 3078F DIAST BP <80 MM HG: CPT

## 2024-09-09 PROCEDURE — 1159F MED LIST DOCD IN RCRD: CPT

## 2024-09-09 PROCEDURE — 90833 PSYTX W PT W E/M 30 MIN: CPT

## 2024-09-09 PROCEDURE — 1160F RVW MEDS BY RX/DR IN RCRD: CPT

## 2024-09-09 PROCEDURE — 3074F SYST BP LT 130 MM HG: CPT

## 2024-09-09 PROCEDURE — 99213 OFFICE O/P EST LOW 20 MIN: CPT

## 2024-09-09 RX ORDER — BUPROPION HYDROCHLORIDE 300 MG/1
300 TABLET ORAL EVERY MORNING
COMMUNITY
Start: 2024-08-12

## 2024-09-09 RX ORDER — INSULIN LISPRO-AABC 100 [IU]/ML
2-10 INJECTION, SOLUTION SUBCUTANEOUS 3 TIMES DAILY
COMMUNITY
Start: 2024-08-07 | End: 2025-02-03

## 2024-09-09 RX ORDER — ROSUVASTATIN CALCIUM 20 MG/1
1 TABLET, COATED ORAL DAILY
COMMUNITY
Start: 2024-08-08
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
## 2024-09-10 NOTE — PLAN OF CARE
Distress tolerance (DBT skills)  Activities: engage in activities that require thought and concentration. Work on remodel.   Contributing: Focus on someone or something other than yourself.   Comparison: look at your situation in comparison to something worse.  Emotions-do something that creates a competing emotion.   Pushing away: Negative thoughts out of your mind and imagine riding your problems on a piece of paper crumpled up and throwing it away refuse to think about the situation until a better time.  Thoughts: Occupy her mind so you do not have to room and well on painful feelings.  Count backwards from 8000 by sevens, recited [place] in your head or read a book  Sensations: Find safe physical sensations to distract you from negative emotions where a rubber band and snap it on your wrist holding ice cubes in your hand or eat something sour like a Lime.

## 2024-12-06 ENCOUNTER — TELEPHONE (OUTPATIENT)
Dept: SLEEP MEDICINE | Facility: HOSPITAL | Age: 64
End: 2024-12-06
Payer: MEDICARE

## 2024-12-16 ENCOUNTER — TELEPHONE (OUTPATIENT)
Dept: SLEEP MEDICINE | Facility: HOSPITAL | Age: 64
End: 2024-12-16
Payer: MEDICARE

## 2024-12-16 NOTE — TELEPHONE ENCOUNTER
Pt called today bc he got a call from [facility].  He is seen here in [facility] so I told him to disregard.  Pt has not yet been able to get his CPAP, he is hoping to get it in January.  Told pt that the order and office visit are only good for 6 months.

## 2025-03-23 ENCOUNTER — HOSPITAL ENCOUNTER (EMERGENCY)
Facility: HOSPITAL | Age: 65
Discharge: HOME | End: 2025-03-23
Attending: EMERGENCY MEDICINE | Admitting: EMERGENCY MEDICINE
Payer: MEDICARE

## 2025-03-23 ENCOUNTER — APPOINTMENT (OUTPATIENT)
Dept: GENERAL RADIOLOGY | Facility: HOSPITAL | Age: 65
End: 2025-03-23
Payer: MEDICARE

## 2025-03-23 VITALS
HEART RATE: 75 BPM | DIASTOLIC BLOOD PRESSURE: 65 MMHG | OXYGEN SATURATION: 99 % | WEIGHT: 205.03 LBS | BODY MASS INDEX: 26.31 KG/M2 | TEMPERATURE: 97.6 F | RESPIRATION RATE: 15 BRPM | HEIGHT: 74 IN | SYSTOLIC BLOOD PRESSURE: 110 MMHG

## 2025-03-23 DIAGNOSIS — E11.65 HYPERGLYCEMIA DUE TO DIABETES MELLITUS: Primary | ICD-10-CM

## 2025-03-23 DIAGNOSIS — E86.0 DEHYDRATION: ICD-10-CM

## 2025-03-23 LAB
ALBUMIN SERPL-MCNC: 4.4 G/DL (ref 3.5–5.2)
ALBUMIN/GLOB SERPL: 1.2 G/DL
ALP SERPL-CCNC: 115 U/L (ref 39–117)
ALT SERPL W P-5'-P-CCNC: 27 U/L (ref 1–41)
ANION GAP SERPL CALCULATED.3IONS-SCNC: 13 MMOL/L (ref 5–15)
AST SERPL-CCNC: 19 U/L (ref 1–40)
BASOPHILS # BLD AUTO: 0.07 10*3/MM3 (ref 0–0.2)
BASOPHILS NFR BLD AUTO: 0.7 % (ref 0–1.5)
BILIRUB SERPL-MCNC: 0.3 MG/DL (ref 0–1.2)
BILIRUB UR QL STRIP: NEGATIVE
BUN SERPL-MCNC: 16 MG/DL (ref 8–23)
BUN/CREAT SERPL: 11 (ref 7–25)
CALCIUM SPEC-SCNC: 10.1 MG/DL (ref 8.6–10.5)
CHLORIDE SERPL-SCNC: 100 MMOL/L (ref 98–107)
CLARITY UR: CLEAR
CO2 SERPL-SCNC: 23 MMOL/L (ref 22–29)
COLOR UR: YELLOW
CREAT SERPL-MCNC: 1.45 MG/DL (ref 0.76–1.27)
DEPRECATED RDW RBC AUTO: 42.7 FL (ref 37–54)
EGFRCR SERPLBLD CKD-EPI 2021: 53.8 ML/MIN/1.73
EOSINOPHIL # BLD AUTO: 0.37 10*3/MM3 (ref 0–0.4)
EOSINOPHIL NFR BLD AUTO: 3.9 % (ref 0.3–6.2)
ERYTHROCYTE [DISTWIDTH] IN BLOOD BY AUTOMATED COUNT: 13.3 % (ref 12.3–15.4)
FLUAV RNA RESP QL NAA+PROBE: NOT DETECTED
FLUBV RNA RESP QL NAA+PROBE: NOT DETECTED
GEN 5 1HR TROPONIN T REFLEX: 42 NG/L
GLOBULIN UR ELPH-MCNC: 3.6 GM/DL
GLUCOSE BLDC GLUCOMTR-MCNC: 244 MG/DL (ref 70–99)
GLUCOSE SERPL-MCNC: 321 MG/DL (ref 65–99)
GLUCOSE UR STRIP-MCNC: ABNORMAL MG/DL
HCT VFR BLD AUTO: 46.2 % (ref 37.5–51)
HGB BLD-MCNC: 15.1 G/DL (ref 13–17.7)
HGB UR QL STRIP.AUTO: NEGATIVE
HOLD SPECIMEN: NORMAL
IMM GRANULOCYTES # BLD AUTO: 0.03 10*3/MM3 (ref 0–0.05)
IMM GRANULOCYTES NFR BLD AUTO: 0.3 % (ref 0–0.5)
KETONES UR QL STRIP: NEGATIVE
LEUKOCYTE ESTERASE UR QL STRIP.AUTO: NEGATIVE
LYMPHOCYTES # BLD AUTO: 2.06 10*3/MM3 (ref 0.7–3.1)
LYMPHOCYTES NFR BLD AUTO: 21.7 % (ref 19.6–45.3)
MCH RBC QN AUTO: 28.8 PG (ref 26.6–33)
MCHC RBC AUTO-ENTMCNC: 32.7 G/DL (ref 31.5–35.7)
MCV RBC AUTO: 88.2 FL (ref 79–97)
MONOCYTES # BLD AUTO: 0.62 10*3/MM3 (ref 0.1–0.9)
MONOCYTES NFR BLD AUTO: 6.5 % (ref 5–12)
NEUTROPHILS NFR BLD AUTO: 6.36 10*3/MM3 (ref 1.7–7)
NEUTROPHILS NFR BLD AUTO: 66.9 % (ref 42.7–76)
NITRITE UR QL STRIP: NEGATIVE
NRBC BLD AUTO-RTO: 0 /100 WBC (ref 0–0.2)
NT-PROBNP SERPL-MCNC: 2016 PG/ML (ref 0–900)
PH UR STRIP.AUTO: 5.5 [PH] (ref 5–8)
PLATELET # BLD AUTO: 225 10*3/MM3 (ref 140–450)
PMV BLD AUTO: 9.4 FL (ref 6–12)
POTASSIUM SERPL-SCNC: 4.9 MMOL/L (ref 3.5–5.2)
PROT SERPL-MCNC: 8 G/DL (ref 6–8.5)
PROT UR QL STRIP: NEGATIVE
RBC # BLD AUTO: 5.24 10*6/MM3 (ref 4.14–5.8)
RSV RNA RESP QL NAA+PROBE: NOT DETECTED
SARS-COV-2 RNA RESP QL NAA+PROBE: NOT DETECTED
SODIUM SERPL-SCNC: 136 MMOL/L (ref 136–145)
SP GR UR STRIP: 1.02 (ref 1–1.03)
TROPONIN T % DELTA: -9
TROPONIN T NUMERIC DELTA: -4 NG/L
TROPONIN T SERPL HS-MCNC: 46 NG/L
UROBILINOGEN UR QL STRIP: ABNORMAL
WBC NRBC COR # BLD AUTO: 9.51 10*3/MM3 (ref 3.4–10.8)
WHOLE BLOOD HOLD COAG: NORMAL
WHOLE BLOOD HOLD SPECIMEN: NORMAL

## 2025-03-23 PROCEDURE — 87637 SARSCOV2&INF A&B&RSV AMP PRB: CPT | Performed by: EMERGENCY MEDICINE

## 2025-03-23 PROCEDURE — 80053 COMPREHEN METABOLIC PANEL: CPT | Performed by: EMERGENCY MEDICINE

## 2025-03-23 PROCEDURE — 96374 THER/PROPH/DIAG INJ IV PUSH: CPT

## 2025-03-23 PROCEDURE — 71045 X-RAY EXAM CHEST 1 VIEW: CPT

## 2025-03-23 PROCEDURE — 82948 REAGENT STRIP/BLOOD GLUCOSE: CPT

## 2025-03-23 PROCEDURE — 25010000002 ONDANSETRON PER 1 MG: Performed by: EMERGENCY MEDICINE

## 2025-03-23 PROCEDURE — 83880 ASSAY OF NATRIURETIC PEPTIDE: CPT | Performed by: EMERGENCY MEDICINE

## 2025-03-23 PROCEDURE — 25810000003 SODIUM CHLORIDE 0.9 % SOLUTION: Performed by: EMERGENCY MEDICINE

## 2025-03-23 PROCEDURE — 99284 EMERGENCY DEPT VISIT MOD MDM: CPT

## 2025-03-23 PROCEDURE — 36415 COLL VENOUS BLD VENIPUNCTURE: CPT

## 2025-03-23 PROCEDURE — 85025 COMPLETE CBC W/AUTO DIFF WBC: CPT

## 2025-03-23 PROCEDURE — 81003 URINALYSIS AUTO W/O SCOPE: CPT | Performed by: EMERGENCY MEDICINE

## 2025-03-23 PROCEDURE — 93005 ELECTROCARDIOGRAM TRACING: CPT | Performed by: EMERGENCY MEDICINE

## 2025-03-23 PROCEDURE — 84484 ASSAY OF TROPONIN QUANT: CPT | Performed by: EMERGENCY MEDICINE

## 2025-03-23 PROCEDURE — 93005 ELECTROCARDIOGRAM TRACING: CPT

## 2025-03-23 RX ORDER — ONDANSETRON 2 MG/ML
4 INJECTION INTRAMUSCULAR; INTRAVENOUS ONCE
Status: COMPLETED | OUTPATIENT
Start: 2025-03-23 | End: 2025-03-23

## 2025-03-23 RX ORDER — SODIUM CHLORIDE 0.9 % (FLUSH) 0.9 %
10 SYRINGE (ML) INJECTION AS NEEDED
Status: DISCONTINUED | OUTPATIENT
Start: 2025-03-23 | End: 2025-03-23 | Stop reason: HOSPADM

## 2025-03-23 RX ADMIN — SODIUM CHLORIDE 1000 ML: 0.9 INJECTION, SOLUTION INTRAVENOUS at 11:29

## 2025-03-23 RX ADMIN — SODIUM CHLORIDE 1000 ML: 0.9 INJECTION, SOLUTION INTRAVENOUS at 10:10

## 2025-03-23 RX ADMIN — ONDANSETRON 4 MG: 2 INJECTION INTRAMUSCULAR; INTRAVENOUS at 10:11

## 2025-03-23 NOTE — ED PROVIDER NOTES
Time: 9:11 AM EDT  Date of encounter:  3/23/2025  Independent Historian/Clinical History and Information was obtained by:   Patient    History is limited by: N/A    Chief Complaint: Lightheaded, weak      History of Present Illness:  Patient is a 64 y.o. year old male who presents to the emergency department for evaluation of weakness and lightheadedness.  Patient reports that since last night he feels very weak and lightheaded when he stands up.  The patient went to bed last night but had the same symptoms when he woke up this morning.       Patient Care Team  Primary Care Provider: Marco Nicholson MD    Past Medical History:     Allergies   Allergen Reactions    Alpha-Gal Hives, Itching and Swelling    Penicillins Anaphylaxis    A-D-Dimethicone-E-Zno [Dimethicone-Zinc Oxide] Hives    Bacitracin Itching and Rash    Bacitracin-Neomycin-Polymyxin Hives     Hives and blisters    Bydureon [Exenatide] Hives     Past Medical History:   Diagnosis Date    Allergy to alpha-gal     Anemia     Angina pectoris     Anxiety and depression     Arthritis 2000    Asthma     Bipolar disorder 2008    Broken bones     Bronchitis     Bulging lumbar disc     CAD (coronary artery disease)     Chronic pain disorder 2008    Crohn's colitis     Deafness 2010    Depression     Deviated nasal septum     Diabetes mellitus     Disease of thyroid gland     Dysphagia     Erectile dysfunction 04/23/2018    Forgetfulness 2008    GERD (gastroesophageal reflux disease)     Gout 2007    H/O ulceration     Ulcer    Head injury Cant remember    Heart attack     Hemorrhoids 2000    Hiatal hernia     History of sleep walking     HLD (hyperlipidemia)     High Cholesterol    Hypertension     Hypoparathyroidism     Hypothyroidism     Irritant contact dermatitis due to concrete     CONCRETE POISONING    Leg pain     Limb swelling     Liver disease     Migraines     Muscle cramps     Muscular dystrophy     Myocardial infarction     x 2    Narcolepsy      Nasal obstruction     Nausea and vomiting 03/29/2023    Neurologic disorder     Unspecified    Night sweat     Obsessive-compulsive disorder 1970    All my life    LYNNETTE (obstructive sleep apnea)     Peptic ulceration     Perineal pain in male 05/24/2018    Peripheral neuropathy 2008    Pituitary gland disorder     Pneumonia     PONV (postoperative nausea and vomiting)     PTSD (post-traumatic stress disorder) 7568-4521    You said it not me    Rectal bleeding     RLS (restless legs syndrome)     Seasonal allergies     Sinus trouble     Skin disease 2000    Skin Disease/Psoriasis/eczema    Testosterone deficiency     Testosterone deficiency in male     Trouble swallowing     Vestibular migraine     Vitamin D deficiency      Past Surgical History:   Procedure Laterality Date    ABDOMINAL SURGERY  [id number]    Hernia repairs    APPENDECTOMY  1976    CARPAL TUNNEL RELEASE      COLONOSCOPY      COLONOSCOPY N/A 01/12/2024    Procedure: COLONOSCOPY;  Surgeon: Landry Elizabeth MD;  Location: [facility] ENDOSCOPY;  Service: Gastroenterology;  Laterality: N/A;  HEMORRHOIDS    CORONARY ANGIOPLASTY WITH STENT PLACEMENT      ENDOSCOPY      ENDOSCOPY N/A 06/19/2023    Procedure: ESOPHAGOGASTRODUODENOSCOPY WITH BIOPSIES, BALLOON DILATATION 18-20;  Surgeon: Landry Elizabeth MD;  Location: [facility] ENDOSCOPY;  Service: Gastroenterology;  Laterality: N/A;  hiatal hernia, SCHATZKIS RING    ESOPHAGUS SURGERY      HERNIA REPAIR      Umbilical    NASAL SEPTUM SURGERY  04/2018    Dr. Pearl - Septoplasty with bilateral reduction of nasal turbinates    POLYPECTOMY      TONSILECTOMY, ADENOIDECTOMY, BILATERAL MYRINGOTOMY AND TUBES  1972    TONSILLECTOMY  1972     Family History   Problem Relation Age of Onset    Stroke Mother     Heart disease Mother     Diabetes Mother         Diabetes Mellitus    Arthritis Mother     Narcolepsy Mother     Restless legs syndrome Mother     Periodic limb movement Mother     Insomnia Mother     Sleep  walking Mother     Diabetes Father         Diabetes Mellitus    Arthritis Father     Asthma Father             Heart attack Father     Heart disease Father         Heart Failure    Alcohol abuse Father     Heart disease Sister     Diabetes Sister         Diabetes Mellitus    Sleep apnea Sister     Heart disease Brother         Heart failure    Cancer Brother         Unspecified    Diabetes Brother         Diabetes Mellitus    Arthritis Brother     Sleep apnea Brother     Restless legs syndrome Brother     Colon cancer Maternal Uncle 71    Colon cancer Maternal Grandfather 90       Home Medications:  Prior to Admission medications    Medication Sig Start Date End Date Taking? Authorizing Provider   allopurinol (ZYLOPRIM) 300 MG tablet Take 1 tablet by mouth Daily. 19   Susu Heck APRN   aspirin (aspirin) 81 MG EC tablet Take 1 tablet by mouth Daily. 6/23/15   Adriana Lee MD   Azelastine HCl 137 MCG/SPRAY solution USE 1 TO 2 SPRAY(S) IN EACH NOSTRIL TWICE DAILY AS NEEDED 5/15/24   Adriana Lee MD   B Complex Vitamins (VITAMIN B COMPLEX PO) Take  by mouth.    Adriana Lee MD   bisacodyl (DULCOLAX) 5 MG EC tablet Take 3 tablets by mouth Daily As Needed for Constipation.    Adriana Lee MD   buPROPion XL (WELLBUTRIN XL) 300 MG 24 hr tablet Take 1 tablet by mouth Every Morning. 24   Adriana Lee MD   cetirizine (zyrTEC) 10 MG tablet Take 1 tablet by mouth 2 (Two) Times a Day.    Adriana Lee MD   Continuous Blood Gluc Sensor (FreeStyle Agapito 2 Sensor) misc 1 EVERY TWO WEEKS 23   Adriana Lee MD   doxazosin (CARDURA) 4 MG tablet Take 1 tablet by mouth Every Night.    Adriana Lee MD   Dulaglutide 1.5 MG/0.5ML solution pen-injector Inject 1.5 mg under the skin into the appropriate area as directed 1 (One) Time Per Week.     Adriana Lee MD   famotidine (PEPCID) 20 MG tablet Take 1 tablet by mouth 2 (Two)  Times a Day.    Adriana Lee MD   levothyroxine (SYNTHROID, LEVOTHROID) 100 MCG tablet Take 1 tablet by mouth Daily.    Adriana Lee MD   Lysine 500 MG capsule Take  by mouth.    Adriana Lee MD   Magnesium 500 MG capsule Daily.    Adriana Lee MD   metFORMIN ER (GLUCOPHAGE-XR) 500 MG 24 hr tablet Take 2 tablets by mouth. 23   Adriana Lee MD   metoprolol succinate XL (TOPROL-XL) 25 MG 24 hr tablet Take 1 tablet by mouth Daily. 23  Adriana Lee MD   ondansetron (ZOFRAN) 8 MG tablet Take  by mouth Every 8 (Eight) Hours As Needed for Nausea or Vomiting.    Adriana Lee MD   pantoprazole (PROTONIX) 40 MG EC tablet Take 1 tablet by mouth Daily.    Adriana Lee MD   polyethylene glycol (MIRALAX) 17 g packet Take 17 g by mouth Daily.    Adriana Lee MD   Prucalopride Succinate (Motegrity) 2 MG tablet Take 1 tablet by mouth Daily. 23   Danii Mclaughlin APRN   ranitidine (ZANTAC) 150 MG capsule Take 1 capsule by mouth Daily. 6/23/15   Adriana Lee MD   rosuvastatin (CRESTOR) 20 MG tablet Take 1 tablet by mouth Daily. 24   Adriana Lee MD   spironolactone (ALDACTONE) 25 MG tablet Take 1 tablet by mouth Daily. 19   Susu Heck APRN   topiramate (TOPAMAX) 100 MG tablet Take 1 tablet by mouth Every 12 (Twelve) Hours. 23   Adriana Lee MD   TURMERIC PO Take  by mouth.    Adriana Lee MD   VITAMIN D, CHOLECALCIFEROL, PO Take  by mouth.    Adriana Lee MD        Social History:   Social History     Tobacco Use    Smoking status: Former     Current packs/day: 0.00     Average packs/day: 2.0 packs/day for 25.0 years (50.0 ttl pk-yrs)     Types: Cigarettes     Start date: 1976     Quit date: 1999     Years since quittin.2    Smokeless tobacco: Never    Tobacco comments:     20 years ago   Vaping Use    Vaping status: Never Used   Substance Use  "Topics    Alcohol use: Not Currently     Comment: None since 9.1.23    Drug use: Yes     Types: Cocaine(coke), Marijuana     Comment: As a teenager  experimental  use never became  habitual         Review of Systems:  Review of Systems   Constitutional:         Generalized weakness   Gastrointestinal:  Positive for constipation.   Neurological:  Positive for light-headedness.        Physical Exam:  /65   Pulse 76   Temp 97.5 °F (36.4 °C) (Oral)   Resp 15   Ht 188 cm (74.02\")   Wt 93 kg (205 lb 0.4 oz)   SpO2 96%   BMI 26.31 kg/m²     Physical Exam  Vitals and nursing note reviewed.   Constitutional:       General: He is not in acute distress.  HENT:      Head: Normocephalic and atraumatic.   Eyes:      Extraocular Movements: Extraocular movements intact.   Cardiovascular:      Rate and Rhythm: Normal rate and regular rhythm.   Pulmonary:      Effort: Pulmonary effort is normal. No respiratory distress.      Breath sounds: Normal breath sounds.   Abdominal:      Palpations: Abdomen is soft.      Tenderness: There is no abdominal tenderness.   Musculoskeletal:         General: Normal range of motion.      Cervical back: Normal range of motion.   Skin:     General: Skin is warm and dry.   Neurological:      General: No focal deficit present.      Mental Status: He is alert and oriented to person, place, and time.   Psychiatric:         Mood and Affect: Mood normal.                  Medical Decision Making:      Comorbidities that affect care:    Thyroid disease, coronary artery disease, liver disease, anemia, hypertension    External Notes reviewed:    Patient seen 9/9/2024 by behavioral health nurse practitioner for depression      The following orders were placed and all results were independently analyzed by me:  Orders Placed This Encounter   Procedures    COVID-19, FLU A/B, RSV PCR 1 HR TAT - Swab, Nasopharynx    XR Chest 1 View    [place] Draw    Comprehensive Metabolic Panel    BNP    High " Sensitivity Troponin T    CBC Auto Differential    High Sensitivity Troponin T 1Hr    Urinalysis With Microscopic If Indicated (No Culture) - Urine, Clean Catch    NPO Diet NPO Type: Strict NPO    Undress & Gown    Continuous Pulse Oximetry    Vital Signs    Orthostatic Vitals (Blood Pressure & Heart Rate)    Oxygen Therapy- Nasal Cannula; Titrate 1-6 LPM Per SpO2; 90 - 95%    POC Glucose Once    ECG 12 Lead ED Triage Standing Order; SOA    Insert Peripheral IV    CBC & Differential    Green Top (Gel)    Lavender Top    Gold Top - SST    Light Blue Top    Extra Tubes    Gray Top       Medications Given in the Emergency Department:  Medications   sodium chloride 0.9 % flush 10 mL (has no administration in time range)   sodium chloride 0.9 % bolus 1,000 mL (0 mL Intravenous Stopped 3/23/25 1133)   ondansetron (ZOFRAN) injection 4 mg (4 mg Intravenous Given 3/23/25 1011)   sodium chloride 0.9 % bolus 1,000 mL (0 mL Intravenous Stopped 3/23/25 1232)        ED Course:    ED Course as of 03/23/25 1342   Sun Mar 23, 2025   0828 EKG:    Rhythm: Normal sinus rhythm  Rate: 100  Intervals: IVCD  T-wave: Low amplitude  ST Segment: Nonspecific changes    EKG Comparison: None available    Interpreted by me   [NL]   1335 XR Chest 1 View [NL]      ED Course User Index  [NL] Fernandez New DO       Labs:    Lab Results (last 24 hours)       Procedure Component Value Units Date/Time    POC Glucose Once [[id number]]  (Abnormal) Collected: 03/23/25 0824    Specimen: Blood Updated: 03/23/25 0825     Glucose 244 mg/dL      Comment: Serial Number: [id number]:  [id number]       CBC & Differential [[id number]]  (Normal) Collected: 03/23/25 0841    Specimen: Blood Updated: 03/23/25 0849    Narrative:      The following orders were created for panel order CBC & Differential.  Procedure                               Abnormality         Status                     ---------                               -----------         ------                      CBC Auto Differential[[id number]]        Normal              Final result                 Please view results for these tests on the individual orders.    Comprehensive Metabolic Panel [[id number]]  (Abnormal) Collected: 03/23/25 0841    Specimen: Blood Updated: 03/23/25 0916     Glucose 321 mg/dL      BUN 16 mg/dL      Creatinine 1.45 mg/dL      Sodium 136 mmol/L      Potassium 4.9 mmol/L      Chloride 100 mmol/L      CO2 23.0 mmol/L      Calcium 10.1 mg/dL      Total Protein 8.0 g/dL      Albumin 4.4 g/dL      ALT (SGPT) 27 U/L      AST (SGOT) 19 U/L      Alkaline Phosphatase 115 U/L      Total Bilirubin 0.3 mg/dL      Globulin 3.6 gm/dL      A/G Ratio 1.2 g/dL      BUN/Creatinine Ratio 11.0     Anion Gap 13.0 mmol/L      eGFR 53.8 mL/min/1.73     Narrative:      GFR Categories in Chronic Kidney Disease (CKD)      GFR Category          GFR (mL/min/1.73)    Interpretation  G1                     90 or greater         Normal or high (1)  G2                      60-89                Mild decrease (1)  G3a                   45-59                Mild to moderate decrease  G3b                   30-44                Moderate to severe decrease  G4                    15-29                Severe decrease  G5                    14 or less           Kidney failure          (1)In the absence of evidence of kidney disease, neither GFR category G1 or G2 fulfill the criteria for CKD.    eGFR calculation 2021 CKD-EPI creatinine equation, which does not include race as a factor    BNP [[id number]]  (Abnormal) Collected: 03/23/25 0841    Specimen: Blood Updated: 03/23/25 0913     proBNP 2,016.0 pg/mL     Narrative:      This assay is used as an aid in the diagnosis of individuals suspected of having heart failure. It can be used as an aid in the diagnosis of acute decompensated heart failure (ADHF) in patients presenting with signs and symptoms of ADHF to the emergency department (ED). In addition, NT-proBNP of <300 pg/mL  indicates ADHF is not likely.    Age Range Result Interpretation  NT-proBNP Concentration (pg/mL:      <50             Positive            >450                   Gray                 300-450                    Negative             <300    50-75           Positive            >900                  Gray                300-900                  Negative            <300      >75             Positive            >1800                  Gray                300-1800                  Negative            <300    High Sensitivity Troponin T [[id number]]  (Abnormal) Collected: 03/23/25 0841    Specimen: Blood Updated: 03/23/25 0916     HS Troponin T 46 ng/L     Narrative:      High Sensitive Troponin T Reference Range:  <14.0 ng/L- Negative Female for AMI  <22.0 ng/L- Negative Male for AMI  >=14 - Abnormal Female indicating possible myocardial injury.  >=22 - Abnormal Male indicating possible myocardial injury.   Clinicians would have to utilize clinical acumen, EKG, Troponin, and serial changes to determine if it is an Acute Myocardial Infarction or myocardial injury due to an underlying chronic condition.         CBC Auto Differential [[id number]]  (Normal) Collected: 03/23/25 0841    Specimen: Blood Updated: 03/23/25 0849     WBC 9.51 10*3/mm3      RBC 5.24 10*6/mm3      Hemoglobin 15.1 g/dL      Hematocrit 46.2 %      MCV 88.2 fL      MCH 28.8 pg      MCHC 32.7 g/dL      RDW 13.3 %      RDW-SD 42.7 fl      MPV 9.4 fL      Platelets 225 10*3/mm3      Neutrophil % 66.9 %      Lymphocyte % 21.7 %      Monocyte % 6.5 %      Eosinophil % 3.9 %      Basophil % 0.7 %      Immature Grans % 0.3 %      Neutrophils, Absolute 6.36 10*3/mm3      Lymphocytes, Absolute 2.06 10*3/mm3      Monocytes, Absolute 0.62 10*3/mm3      Eosinophils, Absolute 0.37 10*3/mm3      Basophils, Absolute 0.07 10*3/mm3      Immature Grans, Absolute 0.03 10*3/mm3      nRBC 0.0 /100 WBC     High Sensitivity Troponin T 1Hr [[id number]]  (Abnormal) Collected: 03/23/25  0950    Specimen: Blood from Arm, Right Updated: 03/23/25 1016     HS Troponin T 42 ng/L      Troponin T Numeric Delta -4 ng/L      Troponin T % Delta -9    Narrative:      High Sensitive Troponin T Reference Range:  <14.0 ng/L- Negative Female for AMI  <22.0 ng/L- Negative Male for AMI  >=14 - Abnormal Female indicating possible myocardial injury.  >=22 - Abnormal Male indicating possible myocardial injury.   Clinicians would have to utilize clinical acumen, EKG, Troponin, and serial changes to determine if it is an Acute Myocardial Infarction or myocardial injury due to an underlying chronic condition.         COVID-19, FLU A/B, RSV PCR 1 HR TAT - Swab, Nasopharynx [[id number]]  (Normal) Collected: 03/23/25 1008    Specimen: Swab from Nasopharynx Updated: 03/23/25 1055     COVID19 Not Detected     Influenza A PCR Not Detected     Influenza B PCR Not Detected     RSV, PCR Not Detected    Narrative:      Fact sheet for providers: https://www.fda.gov/media/691574/download    Fact sheet for patients: https://www.fda.gov/media/437035/download    Test performed by PCR.    Urinalysis With Microscopic If Indicated (No Culture) - Urine, Clean Catch [[id number]]  (Abnormal) Collected: 03/23/25 1233    Specimen: Urine, Clean Catch Updated: 03/23/25 1241     Color, UA Yellow     Appearance, UA Clear     pH, UA 5.5     Specific Gravity, UA 1.017     Glucose,  mg/dL (2+)     Ketones, UA Negative     Bilirubin, UA Negative     Blood, UA Negative     Protein, UA Negative     Leuk Esterase, UA Negative     Nitrite, UA Negative     Urobilinogen, UA 1.0 E.U./dL    Narrative:      Urine microscopic not indicated.             Imaging:    XR Chest 1 View  Result Date: 3/23/2025  XR CHEST 1 VW Date of Exam: 3/23/2025 8:36 AM EDT Indication: SOA Triage Protocol Comparison: 9/22/2021 radiographs Findings: Unremarkable cardiomediastinal silhouette. No focal airspace consolidation. No pleural effusion or pneumothorax. No acute osseous  abnormality.     Impression: No evidence of acute disease. Electronically Signed: Franco Lei MD  3/23/2025 8:47 AM EDT  Workstation ID: [id number]        Differential Diagnosis and Discussion:    Weakness: Based on the patient's history, signs, and symptoms, the diffential diagnosis includes but is not limited to meningitis, stroke, sepsis, subarachnoid hemorrhage, intracranial bleeding, encephalitis, acute uti, dehydration, MS, myasthenia gravis, Guillan Tumtum, migraine variant, neuromuscular disorders vertigo, electrolyte imbalance, and metabolic disorders.    PROCEDURES:    Labs were collected in the emergency department and all labs were reviewed and interpreted by me.  X-ray were performed in the emergency department and all X-ray impressions were independently interpreted by me.    ECG 12 Lead ED Triage Standing Order; SOA   Preliminary Result   HEART HMJZ=739  bpm   RR Neikpizm=746  ms   TN Aijejgfa=677  ms   P Horizontal Axis=49  deg   P Front Axis=37  deg   QRSD Interval=89  ms   QT Zqldizet=912  ms   CFpE=896  ms   QRS Axis=-32  deg   T Wave Axis=77  deg   - ABNORMAL ECG -   Sinus tachycardia   Left axis deviation   Anterior infarct, old   Date and Time of Study:2025-03-23 08:28:19          Procedures    MDM  Patient's workup showed blood sugar.  Patient was given IV fluids his orthostatics were stable.  There were no other significant acute findings and patient is stable for discharge.          Patient Care Considerations:    SEPSIS was considered but is NOT present in the emergency department as SIRS criteria is not present.      Consultants/Shared Management Plan:    None    Social Determinants of Health:    Patient is independent, reliable, and has access to care.       Disposition and Care Coordination:    Discharged: The patient is suitable and stable for discharge with no need for consideration of admission.    I have explained the patient´s condition, diagnoses and treatment plan based on the  information available to me at this time. I have answered questions and addressed any concerns. The patient has a good  understanding of the patient´s diagnosis, condition, and treatment plan as can be expected at this point. The vital signs have been stable. The patient´s condition is stable and appropriate for discharge from the emergency department.      The patient will pursue further outpatient evaluation with the primary care physician or other designated or consulting physician as outlined in the discharge instructions. They are agreeable to this plan of care and follow-up instructions have been explained in detail. The patient has received these instructions in written format and has expressed an understanding of the discharge instructions. The patient is aware that any significant change in condition or worsening of symptoms should prompt an immediate return to this or the closest emergency department or call to 911.    Final diagnoses:   Hyperglycemia due to diabetes mellitus   Dehydration        ED Disposition       ED Disposition   Discharge    Condition   Stable    Comment   --               This medical record created using voice recognition software.             Fernandez New DO  03/23/25 0452

## 2025-03-23 NOTE — ED TRIAGE NOTES
"Patient arrives to ED with complaints of feeling weak and feeling like \"someone punched him in his gut\" that started last night.. patient denies pain and denies vomiting and diarrhea but states he has had some nausea   Patient states he has had some shortness of breath and denies chest pain   "

## 2025-03-29 LAB
QT INTERVAL: 382 MS
QTC INTERVAL: 493 MS

## 2025-04-03 ENCOUNTER — TELEPHONE (OUTPATIENT)
Dept: PSYCHIATRY | Facility: CLINIC | Age: 65
End: 2025-04-03
Payer: MEDICARE

## 2025-04-03 NOTE — TELEPHONE ENCOUNTER
Patient was referred out to he therapy on 08/06/2024, patient has not been seen since 09/09/2024, closing out referral order

## 2025-08-08 ENCOUNTER — HOSPITAL ENCOUNTER (OUTPATIENT)
Dept: GENERAL RADIOLOGY | Facility: HOSPITAL | Age: 65
Discharge: HOME | End: 2025-08-08
Payer: MEDICARE

## 2025-08-08 ENCOUNTER — TRANSCRIBE ORDERS (OUTPATIENT)
Age: 65
End: 2025-08-08
Payer: MEDICARE

## 2025-08-08 DIAGNOSIS — M47.812 CERVICAL SPONDYLOSIS: ICD-10-CM

## 2025-08-08 DIAGNOSIS — M77.8 RIGHT SHOULDER TENDONITIS: ICD-10-CM

## 2025-08-08 DIAGNOSIS — M77.8 RIGHT SHOULDER TENDONITIS: Primary | ICD-10-CM

## 2025-08-08 PROCEDURE — 73030 X-RAY EXAM OF SHOULDER: CPT

## 2025-08-08 PROCEDURE — 72050 X-RAY EXAM NECK SPINE 4/5VWS: CPT

## (undated) DEVICE — Device: Brand: DEFENDO AIR/WATER/SUCTION AND BIOPSY VALVE

## (undated) DEVICE — SOLIDIFIER LIQLOC PLS 1500CC BT

## (undated) DEVICE — SOL IRRG H2O PL/BG 1000ML STRL

## (undated) DEVICE — Device